# Patient Record
Sex: FEMALE | Race: WHITE | NOT HISPANIC OR LATINO | Employment: PART TIME | URBAN - METROPOLITAN AREA
[De-identification: names, ages, dates, MRNs, and addresses within clinical notes are randomized per-mention and may not be internally consistent; named-entity substitution may affect disease eponyms.]

---

## 2021-10-01 ENCOUNTER — HISTORICAL (OUTPATIENT)
Dept: ADMINISTRATIVE | Facility: HOSPITAL | Age: 61
End: 2021-10-01

## 2021-10-12 ENCOUNTER — HISTORICAL (OUTPATIENT)
Dept: PHYSICAL THERAPY | Facility: HOSPITAL | Age: 61
End: 2021-10-12

## 2021-10-15 ENCOUNTER — HISTORICAL (OUTPATIENT)
Dept: PHYSICAL THERAPY | Facility: HOSPITAL | Age: 61
End: 2021-10-15

## 2021-10-18 ENCOUNTER — HISTORICAL (OUTPATIENT)
Dept: PHYSICAL THERAPY | Facility: HOSPITAL | Age: 61
End: 2021-10-18

## 2021-10-20 ENCOUNTER — HISTORICAL (OUTPATIENT)
Dept: PHYSICAL THERAPY | Facility: HOSPITAL | Age: 61
End: 2021-10-20

## 2021-10-22 ENCOUNTER — HISTORICAL (OUTPATIENT)
Dept: PHYSICAL THERAPY | Facility: HOSPITAL | Age: 61
End: 2021-10-22

## 2021-10-25 ENCOUNTER — HISTORICAL (OUTPATIENT)
Dept: PHYSICAL THERAPY | Facility: HOSPITAL | Age: 61
End: 2021-10-25

## 2021-11-02 ENCOUNTER — HISTORICAL (OUTPATIENT)
Dept: ADMINISTRATIVE | Facility: HOSPITAL | Age: 61
End: 2021-11-02

## 2021-11-03 ENCOUNTER — HISTORICAL (OUTPATIENT)
Dept: PHYSICAL THERAPY | Facility: HOSPITAL | Age: 61
End: 2021-11-03

## 2021-11-05 ENCOUNTER — HISTORICAL (OUTPATIENT)
Dept: PHYSICAL THERAPY | Facility: HOSPITAL | Age: 61
End: 2021-11-05

## 2021-11-08 ENCOUNTER — HISTORICAL (OUTPATIENT)
Dept: PHYSICAL THERAPY | Facility: HOSPITAL | Age: 61
End: 2021-11-08

## 2021-11-12 ENCOUNTER — HISTORICAL (OUTPATIENT)
Dept: PHYSICAL THERAPY | Facility: HOSPITAL | Age: 61
End: 2021-11-12

## 2021-11-30 ENCOUNTER — HISTORICAL (OUTPATIENT)
Dept: PHYSICAL THERAPY | Facility: HOSPITAL | Age: 61
End: 2021-11-30

## 2021-12-02 ENCOUNTER — HISTORICAL (OUTPATIENT)
Dept: PHYSICAL THERAPY | Facility: HOSPITAL | Age: 61
End: 2021-12-02

## 2021-12-03 ENCOUNTER — HISTORICAL (OUTPATIENT)
Dept: PHYSICAL THERAPY | Facility: HOSPITAL | Age: 61
End: 2021-12-03

## 2021-12-03 ENCOUNTER — HISTORICAL (OUTPATIENT)
Dept: ADMINISTRATIVE | Facility: HOSPITAL | Age: 61
End: 2021-12-03

## 2021-12-06 ENCOUNTER — HISTORICAL (OUTPATIENT)
Dept: PHYSICAL THERAPY | Facility: HOSPITAL | Age: 61
End: 2021-12-06

## 2021-12-14 ENCOUNTER — HISTORICAL (OUTPATIENT)
Dept: PHYSICAL THERAPY | Facility: HOSPITAL | Age: 61
End: 2021-12-14

## 2021-12-15 ENCOUNTER — HISTORICAL (OUTPATIENT)
Dept: PHYSICAL THERAPY | Facility: HOSPITAL | Age: 61
End: 2021-12-15

## 2022-04-10 ENCOUNTER — HISTORICAL (OUTPATIENT)
Dept: ADMINISTRATIVE | Facility: HOSPITAL | Age: 62
End: 2022-04-10

## 2022-04-25 VITALS
HEIGHT: 62 IN | WEIGHT: 141.13 LBS | SYSTOLIC BLOOD PRESSURE: 117 MMHG | BODY MASS INDEX: 25.97 KG/M2 | DIASTOLIC BLOOD PRESSURE: 72 MMHG

## 2024-01-19 ENCOUNTER — HOSPITAL ENCOUNTER (EMERGENCY)
Facility: HOSPITAL | Age: 64
Discharge: HOME | End: 2024-01-19
Attending: STUDENT IN AN ORGANIZED HEALTH CARE EDUCATION/TRAINING PROGRAM
Payer: MEDICAID

## 2024-01-19 VITALS
OXYGEN SATURATION: 97 % | DIASTOLIC BLOOD PRESSURE: 76 MMHG | WEIGHT: 140 LBS | SYSTOLIC BLOOD PRESSURE: 116 MMHG | HEIGHT: 62 IN | BODY MASS INDEX: 25.76 KG/M2 | RESPIRATION RATE: 18 BRPM | TEMPERATURE: 98 F | HEART RATE: 96 BPM

## 2024-01-19 DIAGNOSIS — R07.81 RIB PAIN: ICD-10-CM

## 2024-01-19 DIAGNOSIS — R07.89 CHEST WALL PAIN: ICD-10-CM

## 2024-01-19 DIAGNOSIS — R07.81 RIB PAIN ON LEFT SIDE: Primary | ICD-10-CM

## 2024-01-19 PROCEDURE — 25000003 PHARM REV CODE 250: Performed by: NURSE PRACTITIONER

## 2024-01-19 PROCEDURE — 99284 EMERGENCY DEPT VISIT MOD MDM: CPT | Mod: 25

## 2024-01-19 RX ORDER — KETOROLAC TROMETHAMINE 10 MG/1
10 TABLET, FILM COATED ORAL EVERY 6 HOURS
Qty: 20 TABLET | Refills: 0 | Status: SHIPPED | OUTPATIENT
Start: 2024-01-19 | End: 2024-01-24

## 2024-01-19 RX ORDER — KETOROLAC TROMETHAMINE 10 MG/1
10 TABLET, FILM COATED ORAL
Status: COMPLETED | OUTPATIENT
Start: 2024-01-19 | End: 2024-01-19

## 2024-01-19 RX ORDER — METHOCARBAMOL 500 MG/1
1000 TABLET, FILM COATED ORAL 3 TIMES DAILY
Qty: 30 TABLET | Refills: 0 | Status: SHIPPED | OUTPATIENT
Start: 2024-01-19 | End: 2024-01-24

## 2024-01-19 RX ORDER — METHOCARBAMOL 500 MG/1
1000 TABLET, FILM COATED ORAL
Status: COMPLETED | OUTPATIENT
Start: 2024-01-19 | End: 2024-01-19

## 2024-01-19 RX ADMIN — METHOCARBAMOL 1000 MG: 500 TABLET ORAL at 05:01

## 2024-01-19 RX ADMIN — KETOROLAC TROMETHAMINE 10 MG: 10 TABLET, FILM COATED ORAL at 05:01

## 2024-01-19 NOTE — ED PROVIDER NOTES
Encounter Date: 1/19/2024       History     Chief Complaint   Patient presents with    Rib Injury     Pt c/o left rib pain that started Monday after she leaned over her porch; denies falling.      64 yo female with h/o thyroid disorder presents with a c/o left rib pain.  Onset x1 week ago.  Provocative factors include movement and deep breathing.  Palliative factors include rest.  Pt admits that she reached over a railing to throw something in her trash and felt a pop/strain in her left lower ribs.  Pain has been persistent over the week.  She denies fever, cough and congestion, dyspnea, chest pain, palpitations, abd pain, n/v/d, weakness, dizziness.    The history is provided by the patient. No  was used.     Review of patient's allergies indicates:  No Known Allergies  Past Medical History:   Diagnosis Date    Thyroid disease      Past Surgical History:   Procedure Laterality Date    THYROIDECTOMY, PARTIAL      TUBAL LIGATION       No family history on file.  Social History     Tobacco Use    Smoking status: Every Day     Current packs/day: 0.50     Average packs/day: 0.5 packs/day for 35.0 years (17.5 ttl pk-yrs)     Types: Cigarettes   Substance Use Topics    Alcohol use: No     Alcohol/week: 0.0 standard drinks of alcohol    Drug use: No     Review of Systems   Constitutional: Negative.  Negative for appetite change, chills and fever.   HENT:  Negative for congestion and sore throat.    Eyes: Negative.    Respiratory:  Negative for cough, chest tightness, shortness of breath and wheezing.    Cardiovascular: Negative.  Negative for chest pain and palpitations.   Gastrointestinal: Negative.  Negative for abdominal pain, diarrhea, nausea and vomiting.   Endocrine: Negative.    Genitourinary: Negative.  Negative for dysuria.   Musculoskeletal: Negative.  Negative for back pain.        Left lower rib pain   Skin: Negative.  Negative for rash.   Allergic/Immunologic: Negative.    Neurological:  Negative.  Negative for dizziness, weakness, light-headedness and headaches.   Hematological: Negative.  Does not bruise/bleed easily.   Psychiatric/Behavioral: Negative.         Physical Exam     Initial Vitals [01/19/24 1634]   BP Pulse Resp Temp SpO2   116/76 96 18 98.2 °F (36.8 °C) 97 %      MAP       --         Physical Exam    Nursing note and vitals reviewed.  Constitutional: She appears well-developed and well-nourished.   HENT:   Head: Normocephalic and atraumatic.   Eyes: Conjunctivae and EOM are normal. Pupils are equal, round, and reactive to light.   Neck: Neck supple.   Normal range of motion.  Cardiovascular:  Normal rate, regular rhythm, normal heart sounds and intact distal pulses.           Pulmonary/Chest: Breath sounds normal.   Abdominal: Abdomen is soft. Bowel sounds are normal.   Musculoskeletal:         General: Normal range of motion.        Arms:       Cervical back: Normal range of motion and neck supple.     Neurological: She is alert and oriented to person, place, and time. She has normal strength.   Skin: Skin is warm and dry. Capillary refill takes less than 2 seconds.   Psychiatric: She has a normal mood and affect. Her behavior is normal.         ED Course   Procedures  Labs Reviewed - No data to display       Imaging Results              X-Ray Chest PA And Lateral (Final result)  Result time 01/19/24 17:05:51      Final result by Jefferson Steele MD (01/19/24 17:05:51)                   Impression:      No acute cardiopulmonary process identified.      Electronically signed by: Jefferson Steele  Date:    01/19/2024  Time:    17:05               Narrative:    EXAMINATION:  XR CHEST PA AND LATERAL    CLINICAL HISTORY:  Other chest pain    TECHNIQUE:  Two-view    COMPARISON:  None avail.    FINDINGS:  Cardiopericardial silhouette is within normal limits.  No acute dense focal or segmental consolidation, congestion, pleural effusion or pneumothorax.                                        X-Ray Ribs 2 View Left (Final result)  Result time 01/19/24 17:07:05      Final result by Jefferson Steele MD (01/19/24 17:07:05)                   Impression:      No acute finding of the left ribs identified.      Electronically signed by: Jefferson Steele  Date:    01/19/2024  Time:    17:07               Narrative:    EXAMINATION:  XR RIBS 2 VIEW LEFT    CLINICAL HISTORY:  Pleurodynia    TECHNIQUE:  Two-view    COMPARISON:  Chest radiograph same date    FINDINGS:  Fine bony details are not well seen due to demineralization.  No definite acute fracture or otherwise deformity of the left ribs identified.  Left lung is well expanded and clear.                                       Medications   ketorolac tablet 10 mg (has no administration in time range)   methocarbamoL tablet 1,000 mg (has no administration in time range)     Medical Decision Making  Differential diagnoses:  rib fracture, rib contusion, costochondritis    62 yo female with h/o thyroid disorder presents with a c/o left rib pain.  Onset x1 week ago.  Provocative factors include movement and deep breathing.  Palliative factors include rest.  Pt admits that she reached over a railing to throw something in her trash and felt a pop/strain in her left lower ribs.  Pain has been persistent over the week.  She denies fever, cough and congestion, dyspnea, chest pain, palpitations, abd pain, n/v/d, weakness, dizziness.  Physical exam as documented.  Patient is nontoxic in appearance and in no acute distress.  She does have some tenderness to palpation over the left lower rib however, she moves and ambulates without any facial grimacing.  She was evaluated with x-rays of her rib and chest which show no acute osseous abnormality, no rib fractures.  Patient's symptoms are consistent with a muscle strain.  She will be discharged home with anti-inflammatories and muscle relaxers to help alleviate her symptoms.  She is to follow up with her primary care provider if  her symptoms persist.  She may return to the ED for worsening.  Patient verbalized understanding of the plan and agrees.    Amount and/or Complexity of Data Reviewed  Radiology: ordered. Decision-making details documented in ED Course.    Risk  OTC drugs.  Prescription drug management.                                      Clinical Impression:  Final diagnoses:  [R07.89] Chest wall pain  [R07.81] Rib pain  [R07.81] Rib pain on left side (Primary)          ED Disposition Condition    Discharge Stable          ED Prescriptions       Medication Sig Dispense Start Date End Date Auth. Provider    ketorolac (TORADOL) 10 mg tablet Take 1 tablet (10 mg total) by mouth every 6 (six) hours. for 5 days 20 tablet 1/19/2024 1/24/2024 Arabella Espinoza NP    methocarbamoL (ROBAXIN) 500 MG Tab Take 2 tablets (1,000 mg total) by mouth 3 (three) times daily. for 5 days 30 tablet 1/19/2024 1/24/2024 Arabella Espinoza NP          Follow-up Information       Follow up With Specialties Details Why Contact Info    Sylvia Antunez NP-C Family Medicine In 1 week For ED follow-up, As needed [address] [facility] [address]  [phone]               Arabella Espinoza NP  01/19/24 3698

## 2024-07-18 DIAGNOSIS — R59.1 LYMPHADENOPATHY: Primary | ICD-10-CM

## 2024-08-07 ENCOUNTER — OFFICE VISIT (OUTPATIENT)
Dept: CARDIAC SURGERY | Facility: CLINIC | Age: 64
End: 2024-08-07
Payer: MEDICARE

## 2024-08-07 VITALS
HEIGHT: 62 IN | SYSTOLIC BLOOD PRESSURE: 131 MMHG | DIASTOLIC BLOOD PRESSURE: 79 MMHG | WEIGHT: 147 LBS | BODY MASS INDEX: 27.05 KG/M2 | HEART RATE: 66 BPM | OXYGEN SATURATION: 100 %

## 2024-08-07 DIAGNOSIS — Z79.1 ENCOUNTER FOR MONITORING NSAID THERAPY: ICD-10-CM

## 2024-08-07 DIAGNOSIS — Z51.81 ENCOUNTER FOR MONITORING NSAID THERAPY: ICD-10-CM

## 2024-08-07 DIAGNOSIS — R59.1 LYMPHADENOPATHY: Primary | ICD-10-CM

## 2024-08-07 DIAGNOSIS — R91.1 PULMONARY NODULE: ICD-10-CM

## 2024-08-07 PROCEDURE — 99204 OFFICE O/P NEW MOD 45 MIN: CPT | Mod: ,,, | Performed by: THORACIC SURGERY (CARDIOTHORACIC VASCULAR SURGERY)

## 2024-08-07 RX ORDER — DICYCLOMINE HYDROCHLORIDE 20 MG/1
TABLET ORAL
COMMUNITY

## 2024-08-07 RX ORDER — GABAPENTIN 300 MG/1
300 CAPSULE ORAL
COMMUNITY
Start: 2024-06-05

## 2024-08-07 RX ORDER — BUPROPION HYDROCHLORIDE 300 MG/1
300 TABLET ORAL EVERY MORNING
COMMUNITY
Start: 2024-07-19

## 2024-08-07 RX ORDER — ALENDRONATE SODIUM 70 MG/1
70 TABLET ORAL
COMMUNITY
End: 2024-08-07

## 2024-08-07 RX ORDER — TRAZODONE HYDROCHLORIDE 50 MG/1
50-100 TABLET ORAL NIGHTLY PRN
COMMUNITY
Start: 2024-07-18 | End: 2024-08-07

## 2024-08-07 RX ORDER — IRON,CARBONYL/ASCORBIC ACID 100-250 MG
1 TABLET ORAL
COMMUNITY
Start: 2024-07-09

## 2024-08-07 RX ORDER — ASPIRIN 325 MG
50000 TABLET, DELAYED RELEASE (ENTERIC COATED) ORAL
COMMUNITY
Start: 2024-06-14

## 2024-08-07 NOTE — H&P (VIEW-ONLY)
History & Physical    SUBJECTIVE:     History of Present Illness:  The patient is presenting for evaluation of enlarging mediastinal lymph nodes.  She is here for possible biopsy.  She denies any shortness of breath or cough.      Chief Complaint   Patient presents with    Pre-op Exam     REFERRAL-DR FUENTES-MEDIASTINOSCOPY. DX: PULMONARY NODULES-RT UPPER LOBE, LYMPHADENOPATHY. PMH: HYPOTHYROIDISM, COPD, EMPHYSEMA, SMOKER.       Review of patient's allergies indicates:  No Known Allergies    Current Outpatient Medications   Medication Sig Dispense Refill    buPROPion (WELLBUTRIN XL) 300 MG 24 hr tablet Take 300 mg by mouth every morning.      cholecalciferol, vitamin D3, 1,250 mcg (50,000 unit) capsule Take 50,000 Units by mouth every 7 days.      dicyclomine (BENTYL) 20 mg tablet TAKE 1 TABLET BY MOUTH 4 TIMES DAILY FOR 30 DAYS for 30      gabapentin (NEURONTIN) 300 MG capsule Take 300 mg by mouth.      iron-vitamin C 100-250 mg, ICAR-C, 100-250 mg Tab Take 1 tablet by mouth.      levothyroxine (SYNTHROID) 88 MCG tablet Take 88 mcg by mouth once daily.       No current facility-administered medications for this visit.       Past Medical History:   Diagnosis Date    Thyroid disease      Past Surgical History:   Procedure Laterality Date    THYROIDECTOMY, PARTIAL      TUBAL LIGATION       No family history on file.  Social History     Tobacco Use    Smoking status: Every Day     Current packs/day: 0.50     Average packs/day: 0.5 packs/day for 35.0 years (17.5 ttl pk-yrs)     Types: Cigarettes   Substance Use Topics    Alcohol use: No     Alcohol/week: 0.0 standard drinks of alcohol    Drug use: No        Review of Systems:  Review of Systems   Constitutional: Negative.    HENT: Negative.     Eyes: Negative.    Respiratory: Negative.     Cardiovascular: Negative.    Gastrointestinal: Negative.    Endocrine: Negative.    Genitourinary: Negative.    Musculoskeletal: Negative.         Claudications   Skin: Negative.   "  Allergic/Immunologic: Negative.    Neurological: Negative.    Hematological: Negative.    Psychiatric/Behavioral: Negative.         OBJECTIVE:     Vital Signs (Most Recent)  Pulse: 66 (08/07/24 1126)  BP: 131/79 (08/07/24 1126)  SpO2: 100 % (08/07/24 1126)  5' 2" (1.575 m)  66.7 kg (147 lb)     Physical Exam:  Physical Exam  Vitals reviewed.   Constitutional:       Appearance: Normal appearance.   HENT:      Head: Normocephalic and atraumatic.      Nose: Nose normal.      Mouth/Throat:      Mouth: Mucous membranes are dry.      Pharynx: Oropharynx is clear.   Eyes:      Extraocular Movements: Extraocular movements intact.      Conjunctiva/sclera: Conjunctivae normal.      Pupils: Pupils are equal, round, and reactive to light.   Cardiovascular:      Rate and Rhythm: Normal rate and regular rhythm.      Pulses: Normal pulses.   Pulmonary:      Effort: Pulmonary effort is normal.      Breath sounds: Normal breath sounds.   Abdominal:      General: Abdomen is flat.      Palpations: Abdomen is soft.   Musculoskeletal:         General: Normal range of motion.      Cervical back: Neck supple.   Skin:     General: Skin is warm and dry.   Neurological:      General: No focal deficit present.   Psychiatric:         Mood and Affect: Mood normal.         Laboratory:  None      Diagnostic Results:  CTA of the chest reviewed with mediastinal lymphadenopathy      ASSESSMENT/PLAN:     Mediastinal lymphadenopathy.  We will definitely recommend mediastinoscopy.  The risks and benefits of the procedure have been explained to the patient including the risk of bleeding and infection.  She elected to proceed.                "

## 2024-08-19 ENCOUNTER — ANESTHESIA EVENT (OUTPATIENT)
Dept: SURGERY | Facility: HOSPITAL | Age: 64
End: 2024-08-19
Payer: MEDICARE

## 2024-08-26 ENCOUNTER — HOSPITAL ENCOUNTER (OUTPATIENT)
Dept: RADIOLOGY | Facility: HOSPITAL | Age: 64
Discharge: HOME | End: 2024-08-26
Attending: THORACIC SURGERY (CARDIOTHORACIC VASCULAR SURGERY)
Payer: MEDICARE

## 2024-08-26 DIAGNOSIS — R59.1 LYMPHADENOPATHY: ICD-10-CM

## 2024-08-26 DIAGNOSIS — R91.1 PULMONARY NODULE: ICD-10-CM

## 2024-08-26 PROCEDURE — 71046 X-RAY EXAM CHEST 2 VIEWS: CPT | Mod: TC

## 2024-08-28 NOTE — PRE-PROCEDURE INSTRUCTIONS
"[facility]: Outpatient Surgery  Preprocedure Check-In Instructions     Your arrival time for your surgery or procedure is ___0500___.  We ask patients to arrive about 2 hours before surgery to allow for enough time to review your health history & medications, start your IV, complete any outstanding labwork or tests, and meet your Anesthesiologist.    Expectations: "Because of inconsistent procedure completion times, an unexpected wait may occur. The Physicians would like you to be here to prepare in the event they run ahead of time. We will make you as comfortable as possible and keep you informed. We apologize in advance if this happens."    You will arrive at [facility], [address], [place].  Enter through the [place] entrance next to the Emergency Room, and come to the 6th floor to the Outpatient Surgery Department.     Visitory Policy:  You are allowed 2 adult visitors to be with you in the hospital. All hospital visitors should be in good current health.  No small children.     What to Bring:  Please have your ID, insurance cards, and all home medication bottles with you at check in.  Bring your CPAP machine if one is used at home.     Fasting:  Nothing to eat or drink after midnight the night before your procedure. This includes no ice, gum, hard candies, and/or tobacco products.  Follow your doctor's instructions for taking any medications on the morning of your procedure.  If no instructions for taking medications were given, do not take any medications but bring your medications in their bottles to your procedure check in.     Follow your doctor's preoperative instructions regarding skin prep, bowel prep, bathing, or showering prior to your procedure.  If any special soaps were provided to you, please use according to your doctor's instructions. If no instructions were given from your doctor, take a good bath or shower with antibacterial soap the night " before and the morning of your procedure.  On the morning of procedure, wear loose, comfortable clothing.  No lotions, makeup, perfumes, colognes, deodorant, or jewelry to your procedure.  Removable items (glasses, contact lenses, dentures, retainers, hearing aids) need to be removed for your procedure.  Bring your storage containers for these items if you wear them.     Artificial nails, body jewelry, eyelash extensions, and/or hair extensions with metal clips are not allowed during your surgery.  If you currently wear any of these items, please arrange for them to be removed prior to your arrival to the hospital.     Outpatient or Same Day Surgeries:  Any patients receiving sedation/anesthesia are advised not to drive for 24 hours after their procedure.  We do not allow patients to drive themselves home after discharge.  If you are going home after your procedure, please have someone available to drive you home from the hospital.        You may call the Outpatient Surgery Department at [phone] with any questions or concerns.  We are looking forward to meeting you and taking great care of you for your procedure.  Thank you for choosing [facility] for your surgical needs.

## 2024-08-29 ENCOUNTER — ANESTHESIA (OUTPATIENT)
Dept: SURGERY | Facility: HOSPITAL | Age: 64
End: 2024-08-29
Payer: MEDICARE

## 2024-08-29 ENCOUNTER — HOSPITAL ENCOUNTER (OUTPATIENT)
Facility: HOSPITAL | Age: 64
Discharge: HOME | End: 2024-08-29
Attending: THORACIC SURGERY (CARDIOTHORACIC VASCULAR SURGERY) | Admitting: THORACIC SURGERY (CARDIOTHORACIC VASCULAR SURGERY)
Payer: MEDICARE

## 2024-08-29 VITALS
OXYGEN SATURATION: 97 % | TEMPERATURE: 97 F | BODY MASS INDEX: 26.53 KG/M2 | SYSTOLIC BLOOD PRESSURE: 122 MMHG | DIASTOLIC BLOOD PRESSURE: 76 MMHG | HEART RATE: 55 BPM | RESPIRATION RATE: 15 BRPM | WEIGHT: 145.06 LBS

## 2024-08-29 DIAGNOSIS — R59.1 LYMPHADENOPATHY: ICD-10-CM

## 2024-08-29 DIAGNOSIS — R91.1 PULMONARY NODULE: ICD-10-CM

## 2024-08-29 LAB
GRAM STN SPEC: NORMAL
GRAM STN SPEC: NORMAL

## 2024-08-29 PROCEDURE — 87102 FUNGUS ISOLATION CULTURE: CPT | Performed by: THORACIC SURGERY (CARDIOTHORACIC VASCULAR SURGERY)

## 2024-08-29 PROCEDURE — 36000711: Performed by: THORACIC SURGERY (CARDIOTHORACIC VASCULAR SURGERY)

## 2024-08-29 PROCEDURE — 87070 CULTURE OTHR SPECIMN AEROBIC: CPT | Performed by: THORACIC SURGERY (CARDIOTHORACIC VASCULAR SURGERY)

## 2024-08-29 PROCEDURE — 37000009 HC ANESTHESIA EA ADD 15 MINS: Performed by: THORACIC SURGERY (CARDIOTHORACIC VASCULAR SURGERY)

## 2024-08-29 PROCEDURE — 37000008 HC ANESTHESIA 1ST 15 MINUTES: Performed by: THORACIC SURGERY (CARDIOTHORACIC VASCULAR SURGERY)

## 2024-08-29 PROCEDURE — 25000003 PHARM REV CODE 250: Performed by: THORACIC SURGERY (CARDIOTHORACIC VASCULAR SURGERY)

## 2024-08-29 PROCEDURE — 71000033 HC RECOVERY, INTIAL HOUR: Performed by: THORACIC SURGERY (CARDIOTHORACIC VASCULAR SURGERY)

## 2024-08-29 PROCEDURE — 63600175 PHARM REV CODE 636 W HCPCS

## 2024-08-29 PROCEDURE — 63600175 PHARM REV CODE 636 W HCPCS: Performed by: ANESTHESIOLOGY

## 2024-08-29 PROCEDURE — 39402 MEDIASTINOSCPY W/LMPH NOD BX: CPT | Mod: ,,, | Performed by: THORACIC SURGERY (CARDIOTHORACIC VASCULAR SURGERY)

## 2024-08-29 PROCEDURE — 87206 SMEAR FLUORESCENT/ACID STAI: CPT | Performed by: THORACIC SURGERY (CARDIOTHORACIC VASCULAR SURGERY)

## 2024-08-29 PROCEDURE — 25000003 PHARM REV CODE 250

## 2024-08-29 PROCEDURE — 87075 CULTR BACTERIA EXCEPT BLOOD: CPT | Performed by: THORACIC SURGERY (CARDIOTHORACIC VASCULAR SURGERY)

## 2024-08-29 PROCEDURE — 63600175 PHARM REV CODE 636 W HCPCS: Performed by: THORACIC SURGERY (CARDIOTHORACIC VASCULAR SURGERY)

## 2024-08-29 PROCEDURE — 71000015 HC POSTOP RECOV 1ST HR: Performed by: THORACIC SURGERY (CARDIOTHORACIC VASCULAR SURGERY)

## 2024-08-29 PROCEDURE — 87205 SMEAR GRAM STAIN: CPT | Performed by: THORACIC SURGERY (CARDIOTHORACIC VASCULAR SURGERY)

## 2024-08-29 PROCEDURE — 36000710: Performed by: THORACIC SURGERY (CARDIOTHORACIC VASCULAR SURGERY)

## 2024-08-29 PROCEDURE — 71000016 HC POSTOP RECOV ADDL HR: Performed by: THORACIC SURGERY (CARDIOTHORACIC VASCULAR SURGERY)

## 2024-08-29 RX ORDER — ONDANSETRON HYDROCHLORIDE 2 MG/ML
INJECTION, SOLUTION INTRAVENOUS
Status: DISCONTINUED | OUTPATIENT
Start: 2024-08-29 | End: 2024-08-29

## 2024-08-29 RX ORDER — LIDOCAINE HYDROCHLORIDE 20 MG/ML
INJECTION INTRAVENOUS
Status: DISCONTINUED | OUTPATIENT
Start: 2024-08-29 | End: 2024-08-29

## 2024-08-29 RX ORDER — KETOROLAC TROMETHAMINE 30 MG/ML
15 INJECTION, SOLUTION INTRAMUSCULAR; INTRAVENOUS ONCE
Status: COMPLETED | OUTPATIENT
Start: 2024-08-29 | End: 2024-08-29

## 2024-08-29 RX ORDER — DEXAMETHASONE SODIUM PHOSPHATE 4 MG/ML
INJECTION, SOLUTION INTRA-ARTICULAR; INTRALESIONAL; INTRAMUSCULAR; INTRAVENOUS; SOFT TISSUE
Status: DISCONTINUED | OUTPATIENT
Start: 2024-08-29 | End: 2024-08-29

## 2024-08-29 RX ORDER — ONDANSETRON HYDROCHLORIDE 2 MG/ML
4 INJECTION, SOLUTION INTRAVENOUS ONCE
Status: COMPLETED | OUTPATIENT
Start: 2024-08-29 | End: 2024-08-29

## 2024-08-29 RX ORDER — PROPOFOL 10 MG/ML
VIAL (ML) INTRAVENOUS
Status: DISCONTINUED | OUTPATIENT
Start: 2024-08-29 | End: 2024-08-29

## 2024-08-29 RX ORDER — ROCURONIUM BROMIDE 10 MG/ML
INJECTION, SOLUTION INTRAVENOUS
Status: DISCONTINUED | OUTPATIENT
Start: 2024-08-29 | End: 2024-08-29

## 2024-08-29 RX ORDER — HYDROMORPHONE HYDROCHLORIDE 2 MG/ML
0.4 INJECTION, SOLUTION INTRAMUSCULAR; INTRAVENOUS; SUBCUTANEOUS EVERY 5 MIN PRN
Status: DISCONTINUED | OUTPATIENT
Start: 2024-08-29 | End: 2024-08-29 | Stop reason: HOSPADM

## 2024-08-29 RX ORDER — SODIUM CHLORIDE 0.9 % (FLUSH) 0.9 %
10 SYRINGE (ML) INJECTION
Status: DISCONTINUED | OUTPATIENT
Start: 2024-08-29 | End: 2024-08-29 | Stop reason: HOSPADM

## 2024-08-29 RX ORDER — MIDAZOLAM HYDROCHLORIDE 1 MG/ML
INJECTION INTRAMUSCULAR; INTRAVENOUS
Status: DISCONTINUED | OUTPATIENT
Start: 2024-08-29 | End: 2024-08-29

## 2024-08-29 RX ORDER — METRONIDAZOLE 500 MG/1
500 TABLET ORAL 2 TIMES DAILY
COMMUNITY

## 2024-08-29 RX ORDER — FENTANYL CITRATE 50 UG/ML
INJECTION, SOLUTION INTRAMUSCULAR; INTRAVENOUS
Status: DISCONTINUED | OUTPATIENT
Start: 2024-08-29 | End: 2024-08-29

## 2024-08-29 RX ORDER — HYDROCODONE BITARTRATE AND ACETAMINOPHEN 5; 325 MG/1; MG/1
1 TABLET ORAL EVERY 6 HOURS PRN
Qty: 15 TABLET | Refills: 0 | Status: SHIPPED | OUTPATIENT
Start: 2024-08-29 | End: 2024-09-02

## 2024-08-29 RX ADMIN — DEXTROSE MONOHYDRATE 1.5 G: 5 INJECTION INTRAVENOUS at 07:08

## 2024-08-29 RX ADMIN — HYDROMORPHONE HYDROCHLORIDE 0.4 MG: 2 INJECTION INTRAMUSCULAR; INTRAVENOUS; SUBCUTANEOUS at 08:08

## 2024-08-29 RX ADMIN — ROCURONIUM BROMIDE 50 MG: 10 SOLUTION INTRAVENOUS at 07:08

## 2024-08-29 RX ADMIN — LIDOCAINE HYDROCHLORIDE 80 MG: 20 INJECTION INTRAVENOUS at 07:08

## 2024-08-29 RX ADMIN — FENTANYL CITRATE 50 MCG: 50 INJECTION, SOLUTION INTRAMUSCULAR; INTRAVENOUS at 07:08

## 2024-08-29 RX ADMIN — SODIUM CHLORIDE, SODIUM GLUCONATE, SODIUM ACETATE, POTASSIUM CHLORIDE AND MAGNESIUM CHLORIDE: 526; 502; 368; 37; 30 INJECTION, SOLUTION INTRAVENOUS at 07:08

## 2024-08-29 RX ADMIN — ONDANSETRON 4 MG: 2 INJECTION INTRAMUSCULAR; INTRAVENOUS at 07:08

## 2024-08-29 RX ADMIN — ONDANSETRON 4 MG: 2 INJECTION INTRAMUSCULAR; INTRAVENOUS at 10:08

## 2024-08-29 RX ADMIN — KETOROLAC TROMETHAMINE 15 MG: 30 INJECTION, SOLUTION INTRAMUSCULAR at 08:08

## 2024-08-29 RX ADMIN — MIDAZOLAM HYDROCHLORIDE 2 MG: 1 INJECTION, SOLUTION INTRAMUSCULAR; INTRAVENOUS at 07:08

## 2024-08-29 RX ADMIN — SUGAMMADEX 200 MG: 100 INJECTION, SOLUTION INTRAVENOUS at 07:08

## 2024-08-29 RX ADMIN — DEXAMETHASONE SODIUM PHOSPHATE 4 MG: 4 INJECTION, SOLUTION INTRA-ARTICULAR; INTRALESIONAL; INTRAMUSCULAR; INTRAVENOUS; SOFT TISSUE at 07:08

## 2024-08-29 RX ADMIN — PROPOFOL 100 MG: 10 INJECTION, EMULSION INTRAVENOUS at 07:08

## 2024-08-29 RX ADMIN — SUGAMMADEX 100 MG: 100 INJECTION, SOLUTION INTRAVENOUS at 08:08

## 2024-08-29 NOTE — OP NOTE
[facility]                       [address] [address]    PATIENT NAME:      MARIA TERESA LIM  YOB: 1960  CSN:               [id number]  MRN:               [id number]  ADMIT DATE:        08/29/2024 04:59:00  PHYSICIAN:         Melony Collier MD                          OPERATIVE REPORT      DATE OF SURGERY:    08/29/2024 00:00:00    SURGEON:  Melony Collier MD    PREOPERATIVE DIAGNOSIS:  Mediastinal adenopathy.    PROCEDURE:  Cervical mediastinoscopy.    POSTOPERATIVE DIAGNOSIS:  Possible carcinoma.    BLOOD LOSS:  Minimal.    ANESTHESIA:  General.    TECHNIQUE:  Under informed consent, patient was taken to the OR, in supine   position, general anesthesia was induced and therefore maintained for remainder   of procedure.  All pressure points padded equally.  Skin over chest and neck was   prepped in the usual sterile fashion.  IV antibiotics administered.  A small   incision was made over the suprasternal notch followed by taking down   subcutaneous tissue and platysma.  The median raphe was penetrated and the   pretracheal plane was penetrated.  Mediastinoscope was placed and it was guided   to the 2R lymph node location.  Multiple biopsies were taken and cultures were   taken.  Frozen section came back as possible carcinoma.  The wounds were   irrigated and closed in layers with absorbable suture.  The patient tolerated   the procedure well.        ______________________________  Melony Collier MD MAA/AQS  DD:  08/29/2024  Time:  08:29AM  DT:  08/29/2024  Time:  09:02AM  Job #:  [id number]      OPERATIVE REPORT

## 2024-08-29 NOTE — TRANSFER OF CARE
Anesthesia Transfer of Care Note    Patient: Valarie Rodriguez    Procedure(s) Performed: Procedure(s) (LRB):  MEDIASTINOSCOPY (N/A)    Patient location: PACU    Anesthesia Type: general    Transport from OR: Transported from OR on room air with adequate spontaneous ventilation    Post pain: adequate analgesia    Post assessment: no apparent anesthetic complications and tolerated procedure well    Post vital signs: stable    Level of consciousness: awake and alert    Nausea/Vomiting: no nausea/vomiting    Complications: none    Transfer of care protocol was followed      Last vitals: Visit Vitals  /64   Pulse 70   Temp 36.6 °C (97.9 °F) (Oral)   Resp 14   Wt 65.8 kg (145 lb 1 oz)   SpO2 99%   Breastfeeding No   BMI 26.53 kg/m²

## 2024-08-29 NOTE — DISCHARGE SUMMARY
[facility] - Periop Services  Cardiothoracic Surgery  Discharge Summary      Patient Name: Valarie Rodriguez  MRN: [id number]  Admission Date: 8/29/2024  Hospital Length of Stay: 0 days  Discharge Date and Time: No discharge date for patient encounter.  Attending Physician: Melony Collier MD   Discharging Provider: MARTHA Hill  Primary Care Provider: Sylvia Antunez NP-C    HPI:   No notes on file    Procedure(s) (LRB):  MEDIASTINOSCOPY (N/A)      Indwelling Lines/Drains at time of discharge:   Lines/Drains/Airways       Airway  Duration                  Airway - Non-Surgical 08/29/24 0725 <1 day                  Hospital Course: No notes on file    Goals of Care Treatment Preferences:             Significant Diagnostic Studies: Radiology: X-Ray: CXR: X-Ray Chest 1 View (CXR): No results found for this visit on 08/29/24.    Pending Diagnostic Studies:       Procedure Component Value Units Date/Time    Flow Cytometry Panel [[id number]] Collected: 08/29/24 0751    Order Status: Sent Lab Status: In process Updated: 08/29/24 0757    Specimen: Tissue from Lymph Node     Specimen to Pathology [[id number]] Collected: 08/29/24 0743    Order Status: Sent Lab Status: No result     Specimen: Tissue from Lymph Node, Tissue from Lymph Node             No new Assessment & Plan notes have been filed under this hospital service since the last note was generated.  Service: Cardiothoracic Surgery    Final Active Diagnoses:    Diagnosis Date Noted POA    PRINCIPAL PROBLEM:  Lymphadenopathy [R59.1] 08/29/2024 Yes      Problems Resolved During this Admission:      Discharged Condition: good    Disposition: Home or Self Care    Follow Up:   Follow-up Information       Melony Collier MD Follow up in 3 week(s).    Specialties: Cardiothoracic Surgery, Cardiology  Contact information:  [address] [place] [address]  [address]  [facility] [id number] [phone]                           Patient Instructions:   No discharge  procedures on file.  Medications:  Reconciled Home Medications:      Medication List        START taking these medications      HYDROcodone-acetaminophen 5-325 mg per tablet  Commonly known as: NORCO  Take 1 tablet by mouth every 6 (six) hours as needed for Pain.            CONTINUE taking these medications      buPROPion 300 MG 24 hr tablet  Commonly known as: WELLBUTRIN XL  Take 300 mg by mouth every morning.     cholecalciferol (vitamin D3) 1,250 mcg (50,000 unit) capsule  Take 50,000 Units by mouth every 7 days.     dicyclomine 20 mg tablet  Commonly known as: BENTYL  TAKE 1 TABLET BY MOUTH 4 TIMES DAILY FOR 30 DAYS for 30     gabapentin 300 MG capsule  Commonly known as: NEURONTIN  Take 300 mg by mouth.     iron-vitamin C 100-250 mg (ICAR-C) 100-250 mg Tab  Take 1 tablet by mouth every other day.     levothyroxine 88 MCG tablet  Commonly known as: SYNTHROID  Take 88 mcg by mouth once daily.     metroNIDAZOLE 500 MG tablet  Commonly known as: FLAGYL  Take 500 mg by mouth 2 (two) times a day.               Expand All Collapse All    History & Physical     SUBJECTIVE:      History of Present Illness:  The patient is presenting for evaluation of enlarging mediastinal lymph nodes.  She is here for possible biopsy.  She denies any shortness of breath or cough.             Chief Complaint   Patient presents with    Pre-op Exam       REFERRAL-DR FUENTES-MEDIASTINOSCOPY. DX: PULMONARY NODULES-RT UPPER LOBE, LYMPHADENOPATHY. PMH: HYPOTHYROIDISM, COPD, EMPHYSEMA, SMOKER.            Ms. Rodriguez is a 65yo female with mediastinal lymphadenopathy. She underwent mediastinoscopy and tolerated the procedure well. She recovered in PACU and was discharged home once all criteria met.  Time spent on the discharge of patient: 20 minutes    MARTHA Hill  Cardiothoracic Surgery  [facility] - Peri Services

## 2024-08-29 NOTE — ANESTHESIA PROCEDURE NOTES
Intubation    Date/Time: 8/29/2024 7:25 AM    Performed by: Anthony Titus  Authorized by: Pepito Villafana MD    Intubation:     Induction:  Intravenous    Intubated:  Postinduction    Mask Ventilation:  Easy mask    Attempts:  1    Attempted By:  Student    Method of Intubation:  Direct    Blade:  Chaudhary 2    Laryngeal View Grade: Grade I - full view of cords      Difficult Airway Encountered?: No      Complications:  None    Airway Device:  Oral endotracheal tube    Airway Device Size:  7.0    Style/Cuff Inflation:  Cuffed (inflated to minimal occlusive pressure)    Tube secured:  22    Secured at:  The lips    Placement Verified By:  Capnometry    Complicating Factors:  None    Findings Post-Intubation:  BS equal bilateral and atraumatic/condition of teeth unchanged

## 2024-08-29 NOTE — ANESTHESIA PREPROCEDURE EVALUATION
08/29/2024  Valarie Rodriguez is a 64 y.o., female.      Pre-op Assessment    I have reviewed the Patient Summary Reports.     I have reviewed the Nursing Notes. I have reviewed the NPO Status.   I have reviewed the Medications.     Review of Systems  Anesthesia Hx:  No problems with previous Anesthesia                Social:  Smoker       Cardiovascular:  Cardiovascular Normal Exercise tolerance: good    Denies Hypertension.   Denies MI.                                         Pulmonary:  Pulmonary Normal        Denies Sleep Apnea.                Renal/:  Renal/ Normal                 Hepatic/GI:  Hepatic/GI Normal     Denies GERD.             Neurological:  Neurology Normal                                      Endocrine:   Hypothyroidism              Physical Exam  General: Alert and Oriented    Airway:  Mallampati: II   Mouth Opening: Normal  TM Distance: Normal  Neck ROM: Normal ROM    Dental:  Intact    Chest/Lungs:  Clear to auscultation    Heart:  Rate: Normal  Rhythm: Regular Rhythm  Sounds: Normal        Anesthesia Plan  Type of Anesthesia, risks & benefits discussed:    Anesthesia Type: Gen ETT  Intra-op Monitoring Plan: Standard ASA Monitors  Post Op Pain Control Plan: multimodal analgesia  Airway Plan: Direct, Post-Induction  Informed Consent: Informed consent signed with the Patient and all parties understand the risks and agree with anesthesia plan.  All questions answered. Patient consented to blood products? Yes  ASA Score: 2  Day of Surgery Review of History & Physical: H&P Update referred to the surgeon/provider.    Ready For Surgery From Anesthesia Perspective.     .

## 2024-08-29 NOTE — DISCHARGE INSTRUCTIONS
NO DRIVING AND NO ALCOHOL consumption FOR 24 HOURS or while taking narcotic pain medications.    Keep sites CLEAN AND DRY for 48 hours. OK to shower afterwards. Do NOT submerge incision under water. Do not apply lotions, creams, or ointments.     NO heavy lifting. DO NOT lift objects greater than 10 lbs. Your doctor will advise at your follow up appointment when to resume normal activity.     Monitor for infection: chills, fever (100.4 F), redness or drainage at surgical site. Notify your doctor if any of these occur.     Report to your nearest ER if you experience any SUDDEN/SEVERE abdominal pain, trouble breathing, uncontrolled pain, profound weakness.     BLEEDING: if you experience uncontrollable bleeding, contact your doctor and go to ER.    NAUSEA: due to the anesthesia, you may experience nausea for up to 24 hours. If nausea and vomiting last longer, contact your doctor.     INFECTION:  watch for any signs or symptoms of infection such as chills, fever, redness or drainage at surgical site. Notify your doctor.     PAIN : take your pain medications as directed. If the pain medications are not helping, notify your doctor.

## 2024-08-30 ENCOUNTER — PATIENT MESSAGE (OUTPATIENT)
Dept: ADMINISTRATIVE | Facility: OTHER | Age: 64
End: 2024-08-30
Payer: MEDICARE

## 2024-08-30 LAB — M AVIUM PARATB TISS QL ZN STN: NORMAL

## 2024-08-30 NOTE — ANESTHESIA POSTPROCEDURE EVALUATION
Anesthesia Post Evaluation    Patient: Valarie Rodriguez    Procedure(s) Performed: Procedure(s) (LRB):  MEDIASTINOSCOPY (N/A)    Final Anesthesia Type: general      Patient location during evaluation: PACU  Patient participation: Yes- Able to Participate  Level of consciousness: awake and alert  Post-procedure vital signs: reviewed and stable  Pain management: adequate  Airway patency: patent    PONV status at discharge: No PONV  Anesthetic complications: no      Cardiovascular status: hemodynamically stable  Respiratory status: spontaneous ventilation and room air  Hydration status: euvolemic  Follow-up not needed.              Vitals Value Taken Time   /76 08/29/24 1139   Temp 36 °C (96.8 °F) 08/29/24 0810   Pulse 55 08/29/24 1155   Resp 15 08/29/24 0850   SpO2 97 % 08/29/24 1155         Event Time   Out of Recovery 08:51:00         Pain/Maddy Score: Pain Rating Prior to Med Admin: 6 (8/29/2024  8:30 AM)  Maddy Score: 10 (8/29/2024 12:01 PM)  Modified Maddy Score: 20 (8/29/2024 12:01 PM)

## 2024-08-31 ENCOUNTER — PATIENT MESSAGE (OUTPATIENT)
Dept: ADMINISTRATIVE | Facility: OTHER | Age: 64
End: 2024-08-31
Payer: MEDICARE

## 2024-09-01 LAB — BACTERIA SPEC ANAEROBE CULT: NORMAL

## 2024-09-03 LAB
BACTERIA TISS AEROBE CULT: NORMAL
PSYCHE PATHOLOGY RESULT: NORMAL

## 2024-09-05 ENCOUNTER — TELEPHONE (OUTPATIENT)
Dept: CARDIAC SURGERY | Facility: CLINIC | Age: 64
End: 2024-09-05
Payer: MEDICARE

## 2024-09-05 DIAGNOSIS — C79.9 METASTATIC CARCINOMA: Primary | ICD-10-CM

## 2024-09-05 NOTE — TELEPHONE ENCOUNTER
Pt states she saw Dr. Martinez today and path results reviewed with her and she believes they are referring her to Oncologist.  Called Dr Martinez's office and they did send referral today to [facility].

## 2024-09-07 LAB
TEMPUS PD-L1 (22C3) COMBINED POSITIVE SCORE: 85
TEMPUS PD-L1 (22C3) TUMOR PROPORTION SCORE: 80 %
TEMPUS PORTAL: NORMAL

## 2024-09-12 DIAGNOSIS — C78.00: Primary | ICD-10-CM

## 2024-09-13 PROBLEM — C34.11 MALIGNANT NEOPLASM OF UPPER LOBE OF RIGHT LUNG: Status: ACTIVE | Noted: 2024-09-13

## 2024-09-13 NOTE — PROGRESS NOTES
Referring physician: Dr. Zach Martinez  Reason for referral: NSCLC      Subjective:       Patient ID: Valarie Rodriguez is a 64 y.o. female.    NSCLC Stage IIIA ([id number])--Diagnosed 24  Biopsy/pathology: Cervical mediastinoscopy done 24--multiple biopsies taken of 2R (upper paratracheal) LN and pathology showed poorly differentiated carcinoma, c/w lung primary site, PD-L1 TPS 80%, CPS 85%, KRAS G12C mutation (approved therapies Adagrasib or Sotorasib), TMB 1.6 (low), MSI cannot be assessed.  Imagin. CT chest w/o contrast 23--7mm RUL spiculated nodule again seen and similar in appearance to comparison, suspicious for neoplasia, mediastinal lymph nodes similar in comparison, emphysema is noted.  2. CT chest w/o contrast done 2/15/24--stable RUL spiculated nodule, development of right hilar adenopathy, enlarged precarinal LN, increased in size compared to previous exam, PET/CT should be considered, emphysema.   3. CT chest w/o contrast 24--stable RUL spiculated nodule measures 7mm, stable small ground glass densities, enlarged precarinal LN measures 2.7X1.9cm, increased in size, right hilar LN vs. Mass measures 2.3X1.6cm, stable, PET should be considered, emphysema.   4. PET/CT done 24--hypermetabolic enlarged right paratracheal LN and prominent right hilum with slightly elevated activity c/w neoplasm, elevated activity of anterior thyroid bed and subcutaneous tissues, likely post-surgical, 7mm spiculated RUL nodule with adjacent vague opacity unchanged, and demonstrates no activity, bilateral L>R trochanteric bursitis, calcified uterine fibroid.     Current treatment plan:   Weekly Carboplatin/Paclitaxel + RT to start 24  Durvalumab maintenance    Chief Complaint: Other Misc (NPO)    HPI  63 yo female was having a RUL nodule monitored on CT scans, then developed enlarging right hilar and mediastinal adenopathy. She underwent mediastinoscopy and biopsy with Dr. Collier and pathology  showed poorly differentiated carcinoma, c/w lung primary, PD-L1 85% and KRAS G12C mutated. Patient underwent PET and showed hypermetabolic, enlarged right paratracheal lymph node, and right hilar LN/activity c/w disease. The 7mm lung nodule was not hypermetabolic. Patient reports that the 7mm lung nodule was biopsied last year and was negative. Patient presents to initial clinic with her daughter. She met with radiation oncology Dr. Kay and recommendations are for concurrent chemotherapy and radiation. I discussed findings on PET, biopsies with patient and her daughter. Discussed usual management of Stage III lung cancer and plans for Durvalumab maintenance once chemo/radiation is complete. Patient is a smoker, 1/2 ppd for years, and has cut back recently, has not quit. She did take Wellbutrin but stopped it because she did not like the way it made her feel. She does have a h/o thyroidectomy in the past and is on thyroid medication.     Past Medical History:   Diagnosis Date    Hyperlipidemia     Lymphadenopathy     Pulmonary nodule     Thyroid disease       Past Surgical History:   Procedure Laterality Date    CHOLECYSTECTOMY      COLONOSCOPY      MEDIASTINOSCOPY N/A 8/29/2024    Procedure: MEDIASTINOSCOPY;  Surgeon: Melony Collier MD;  Location: [facility];  Service: Cardiothoracic;  Laterality: N/A;    THYROIDECTOMY, PARTIAL      TUBAL LIGATION       No family history on file.  Social History     Socioeconomic History    Marital status: Single    Years of education: 11   Tobacco Use    Smoking status: Every Day     Current packs/day: 0.50     Average packs/day: 0.5 packs/day for 35.0 years (17.5 ttl pk-yrs)     Types: Cigarettes   Substance and Sexual Activity    Alcohol use: No     Alcohol/week: 0.0 standard drinks of alcohol    Drug use: No    Sexual activity: Yes     Partners: Male       Review of patient's allergies indicates:  No Known Allergies   Current Outpatient Medications on File Prior to Visit  "  Medication Sig Dispense Refill    cholecalciferol, vitamin D3, 1,250 mcg (50,000 unit) capsule Take 50,000 Units by mouth every 7 days.      iron-vitamin C 100-250 mg, ICAR-C, 100-250 mg Tab Take 1 tablet by mouth every other day.      levothyroxine (SYNTHROID) 88 MCG tablet Take 88 mcg by mouth once daily.      LORazepam (ATIVAN) 1 MG tablet Take 1 mg by mouth 2 (two) times daily.      buPROPion (WELLBUTRIN XL) 300 MG 24 hr tablet Take 300 mg by mouth every morning. (Patient not taking: Reported on 9/17/2024)      dicyclomine (BENTYL) 20 mg tablet TAKE 1 TABLET BY MOUTH 4 TIMES DAILY FOR 30 DAYS for 30 (Patient not taking: Reported on 9/17/2024)      gabapentin (NEURONTIN) 300 MG capsule Take 300 mg by mouth. (Patient not taking: Reported on 9/17/2024)      metroNIDAZOLE (FLAGYL) 500 MG tablet Take 500 mg by mouth 2 (two) times a day. (Patient not taking: Reported on 9/17/2024)       No current facility-administered medications on file prior to visit.      Review of Systems   Constitutional:  Negative for appetite change and unexpected weight change.   HENT:  Negative for mouth sores.    Eyes:  Negative for visual disturbance.   Respiratory:  Negative for cough and shortness of breath.    Cardiovascular:  Negative for chest pain.   Gastrointestinal:  Negative for abdominal pain and diarrhea.   Genitourinary:  Negative for frequency.   Musculoskeletal:  Negative for back pain.   Integumentary:  Negative for rash.   Neurological:  Negative for headaches.   Hematological:  Negative for adenopathy.   Psychiatric/Behavioral:  The patient is not nervous/anxious.             Vitals:    09/17/24 1349   BP: 139/71   Pulse: 75   Resp: 14   Temp: 98.2 °F (36.8 °C)   TempSrc: Oral   SpO2: 99%   Weight: 66.5 kg (146 lb 9.6 oz)   Height: 5' 2" (1.575 m)      Physical Exam  Constitutional:       Appearance: Normal appearance.   HENT:      Head: Normocephalic.      Nose: Nose normal.      Mouth/Throat:      Mouth: Mucous " membranes are moist.   Eyes:      Extraocular Movements: Extraocular movements intact.      Conjunctiva/sclera: Conjunctivae normal.   Cardiovascular:      Rate and Rhythm: Normal rate and regular rhythm.   Pulmonary:      Effort: Pulmonary effort is normal.      Breath sounds: Normal breath sounds.   Chest:      Comments: Anterior superior chest with incision from mediastinoscopy healed well  Abdominal:      General: Bowel sounds are normal. There is no distension.      Palpations: Abdomen is soft.      Tenderness: There is no abdominal tenderness.   Musculoskeletal:         General: Normal range of motion.   Skin:     General: Skin is warm.   Neurological:      General: No focal deficit present.      Mental Status: She is alert and oriented to person, place, and time.   Psychiatric:         Mood and Affect: Mood normal.         Judgment: Judgment normal.       Orders Only on 09/05/2024   Component Date Value Ref Range Status    Reason for Study 09/12/2024 To identify somatic and germline mutations relevant to patient's cancer.   Final    Genetic Diseases Assessed 09/12/2024 Cancer   Final    Description of Ranges of DNA Seque* 09/12/2024 648 gene panel   Final    Overall Interpretation 09/12/2024 positive   Final    MSI 09/12/2024 Indeterminate   Final    TMB 09/12/2024 1.6  m/MB Final    [facility] Portal 09/12/2024 [email or web address]   Final    [facility] Portal link    PD-L1 (22C3) Combined Positive Sco* 09/12/2024 85   Final    PD-L1 (22C3) Tumor Proportion Score 09/12/2024 80  % Final    Fusion Addendum Issue Type 09/12/2024    Final                    Value:NEGATIVE  Negative - This addendum is being issued to report that no gene fusions or altered splicing variants from RNA sequencing analysis were found. This report is being issued to report the results of gene rearrangement and altered splicing analysis  from RNA sequencing. No gene rearrangements nor reportable altered splicing events were identified from RNA sequencing.      Low Coverage Regions 09/12/2024 KDM5D   Final    Therapy Count 09/12/2024 2   Final    [facility]: Potential Therapy 1 09/12/2024    Final                    Value:Gene: 6407^KRAS^HGNC  Variant: p.G12C  Agent: Adagrasib  Tissue: Non-Small Cell Lung Cancer  Association: response  Evidence Status: Consensus  Evidence ID: NCCN  Evidence URL:   FDA Approved?: Yes  on label?: No      [facility]: Potential Therapy 2 09/12/2024    Final                    Value:Gene: 6407^KRAS^HGNC  Variant: p.G12C  Agent: Sotorasib  Tissue: Non-Small Cell Lung Cancer  Association: response  Evidence Status: Consensus  Evidence ID: NCCN  Evidence URL:   FDA Approved?: Yes  on label?: No      Trial Count 09/12/2024 3   Final    [facility]: Clinical Trial Match 1 09/12/2024    Final                    Value:Clinical Trial NCT ID: [id number]  Clinical Trial Title: Phase 1/2 Study of [id number] in Patients With Cancer Having a KRAS G12C Mutation DARCI-1  Clinical Trial URL: https://clinicaltrials.gov/ct2/show/UXA67030639  Clinical Phase: Phase 1/Phase 2  Matched criteria: KRAS p.G12C mutation      [facility]: Clinical Trial Match 2 09/12/2024    Final                    Value:Clinical Trial NCT ID: [id number]  Clinical Trial Title: Study of Safety, Pharmacokinetics, and Antitumor Activity of [id number] in Participants With Advanced or Refractory Tumors  Clinical Trial URL: https://clinicaltrials.gov/ct2/show/BIW31652002  Clinical Phase: Phase 1  Matched criteria: KRAS p.G12C mutation      [facility]: Clinical Trial Match 3 09/12/2024    Final                    Value:Clinical Trial NCT ID: [id number]  Clinical Trial Title: Study of [id number] in Cancer Patients With a Specific Genetic Mutation (KRAS G12C)  Clinical Trial URL: https://clinicaltrials.gov/ct2/show/SWP63015039  Clinical Phase: Phase 1/Phase 2  Matched criteria: KRAS p.G12C  mutation     Admission on 08/29/2024, Discharged on 08/29/2024   Component Date Value Ref Range Status    Pathology Result 08/29/2024    Corrected    Comment:          FINAL DIAGNOSIS     1. LYMPH NODE, 2R, LYMPHADENECTOMY:     METASTATIC POORLY DIFFERENTIATED CARCINOMA CONSISTENT WITH LUNG PRIMARY SITE.     2. LYMPH NODE, 2R, LYMPHADENECTOMY:     METASTATIC POORLY DIFFERENTIATED CARCINOMA CONSISTENT WITH LUNG PRIMARY SITE  (SEE SPECIMEN #1).     CODE 9     Note  1. IMMUNOHISTOCHEMICAL STAINS:  CK7:  Positive.  TTF-1:  Positive.  PAX-8, GCDFP.15, KRISTAN-3, CK20, CD30, SOX-10, S-100, p63, Glypican-3,  Pancytokeratin, CD45, CD68:  Negative in tumor cells.  Control reactions are appropriate.        Electronically Signed by:  Jose So M.D. , Pathologist  (Case signed 09/12/2024 at 12:00am)     Comment  This case has also been reviewed in intradepartmental consultation.  Additional  material from specimen 1B will be submitted to [facility] for PD-L1 and Next  Generation Sequencing studies, the results of which will be the subject of an  addendum report.        ADDENDUM REPORT:  This test was referenced to [facility] for testing.     PD-L1                            Expression     Tumor Proportion Score (TPS):  80%  Combined Positive Score (CPS):  85     For additional information, please refer to the complete  report performed by  [facility].     PLEASE NOTE:  A copy of the performing laboratory's report has been scanned  into the  patient record at [facility] and a copy of the  original report can be printed at your request.        ADDENDUM REPORT:  This test was referenced to [facility] for testing.     648 GENE PANEL xT     Accession No.:  [id number]  Diagnosis:  Metastatic poorly differentiated carcinoma, c/w lung primary site  Tumor Specimen:   Lymph node, 2R  Tumor Percentage:  15%  Specimen ID:  [id number]     For additional information, please refer to the complete  report performed  by  [facility].     PLEASE NOTE:  A copy of the performing laboratory's report has been scanned  into the  patient record at [facility] and a copy of the  original report can be printed at your request.                                   ADDENDUM REPORT:  This test was referenced to [facility] for testing.     Transcriptome RNA     Accession No.:  [id number]  Diagnosis:  Metastatic poorly differentiated carcinoma, c/w lung primary site  Tumor Specimen:   Lymph node, 2R  Tumor Percentage:  15%  Specimen ID:  [id number]     For additional information, please refer to the complete  report performed by  [facility].     PLEASE NOTE:  A copy of the performing laboratory's report has been scanned  into the  patient record at [facility] and a copy of the  original report can be printed at your request.        ADDENDUM REPORT:  This test was referenced to [facility] for testing.     Transcriptome xR     Accession No.:  [id number]  Diagnosis:  Metastatic poorly differentiated carcinoma, c/w lung primary site  Tumor Specimen:   Lymph node, 2R  Tumor Percentage:  15%  Specimen ID:  [id number]     For additional information, please refer to the complete  report performed by  [facility].     PLEASE                            NOTE:  A copy of the performing laboratory's report has been scanned  into the  patient record at [facility] and a copy of the  original report can be printed at your request.     Source  1. LYMPH NODE 2R  2. LYMPH NODE 2R FRESH     Clinical Information  LYMPHADENOPATHY  PULMONARY NODULE     Frozen Section  1. POORLY DIFFERENTIATED MALIGNANT NEOPLASM. DEFER TO PERMANENTS/ JS  08:00-08:15     Gross Description  1. Received fresh for frozen section labeled `Valarielele Rodriguez` and listed on the  requisition as `lymph node 2R` are several irregular fragments of pink-tan soft  tissue.  The specimen aggregates to 3.5 x 1.5 x 1.0 cm.  A frozen  section is  performed.  The frozen section remnant is subsequently submitted in cassette  1A.  The remainder the specimen is subsequently submitted in cassettes 1B-1D.  0-F-4  2. Received fresh labeled `Valarie Rodriguez, lymph node` and listed on the  requisition as `2R lymph node` is a portion of pink-tan soft tissue measuring  0.5                            x 0.5 x 0.4 cm.  A portion of the specimen is reserved for microbiology  testing.  The remainder is submitted for routine histology in cassette 2A.  0-1-1     AM 8/29/2024     Microscopic Description  1. Microscopic examination performed.  Please see diagnosis.     Modification Reason  -154937.3: [facility] Transcriptome RNA and xR  -720368.2: [facility] 648 Gene Panel  -257433.1: [facility] PD-L1        Electronically Signed by:  Jose So M.D. , Pathologist  (Amended Report Signed 09/12/2024 at 01:55pm)       AFB Smear 08/29/2024 No AFB seen (Direct smear only)   Final    Anaerobe Culture 08/29/2024 No Anaerobes Isolated   Final    Fungal Culture 08/29/2024 No Fungus Isolated at 2 Weeks   Preliminary    GRAM STAIN 08/29/2024 Moderate WBC observed   Final    GRAM STAIN 08/29/2024 No bacteria seen   Final    Tissue - Aerobic Culture 08/29/2024 No Growth at 5 days   Final   Lab Visit on 08/26/2024   Component Date Value Ref Range Status    Color, UA 08/26/2024 Light-Yellow  Yellow, Light-Yellow, Colorless, Straw, Dark-Yellow Final    Appearance, UA 08/26/2024 Clear  Clear Final    Specific Gravity, UA 08/26/2024 1.012  1.005 - 1.030 Final    pH, UA 08/26/2024 5.0  5.0 - 8.5 Final    Protein, UA 08/26/2024 Negative  Negative Final    Glucose, UA 08/26/2024 Normal  Negative, Normal Final    Ketones, UA 08/26/2024 Negative  Negative Final    Blood, UA 08/26/2024 1+ (A)  Negative Final    Bilirubin, UA 08/26/2024 Negative  Negative Final    Urobilinogen, UA 08/26/2024 Normal  0.2, 1.0, Normal Final    Nitrites, UA 08/26/2024 Negative  Negative Final     Leukocyte Esterase, UA 08/26/2024 500 (A)  Negative Final    RBC, UA 08/26/2024 0-5  None Seen, 0-2, 3-5, 0-5 /HPF Final    WBC, UA 08/26/2024 6-10 (A)  None Seen, 0-2, 3-5, 0-5 /HPF Final    Bacteria, UA 08/26/2024 None Seen  None Seen, Trace /HPF Final    Squamous Epithelial Cells, UA 08/26/2024 Trace  None Seen /HPF Final    Mucous, UA 08/26/2024 Trace (A)  None Seen /LPF Final    Sodium 08/26/2024 140  136 - 145 mmol/L Final    Potassium 08/26/2024 4.6  3.5 - 5.1 mmol/L Final    Chloride 08/26/2024 105  98 - 107 mmol/L Final    CO2 08/26/2024 27  23 - 31 mmol/L Final    Glucose 08/26/2024 90  82 - 115 mg/dL Final    Blood Urea Nitrogen 08/26/2024 15.5  9.8 - 20.1 mg/dL Final    Creatinine 08/26/2024 0.90  0.55 - 1.02 mg/dL Final    Calcium 08/26/2024 10.1  8.4 - 10.2 mg/dL Final    Protein Total 08/26/2024 7.2  5.8 - 7.6 gm/dL Final    Albumin 08/26/2024 3.7  3.4 - 4.8 g/dL Final    Globulin 08/26/2024 3.5  2.4 - 3.5 gm/dL Final    Albumin/Globulin Ratio 08/26/2024 1.1  1.1 - 2.0 ratio Final    Bilirubin Total 08/26/2024 0.3  <=1.5 mg/dL Final    ALP 08/26/2024 72  40 - 150 unit/L Final    ALT 08/26/2024 9  0 - 55 unit/L Final    AST 08/26/2024 14  5 - 34 unit/L Final    eGFR 08/26/2024 >60  mL/min/1.73/m2 Final    Anion Gap 08/26/2024 8.0  mEq/L Final    BUN/Creatinine Ratio 08/26/2024 17   Final    Group & Rh 08/26/2024 A NEG   Final    Indirect Brian GEL 08/26/2024 NEG   Final    Specimen Outdate 08/26/2024 09/05/2024 23:59   Corrected    PTT 08/26/2024 28.2  23.2 - 33.7 seconds Final    For Minimal Heparin Infusion, the goal aPTT 64-85 seconds corresponds to an anti-Xa of 0.3-0.5.    For Low Intensity and High Intensity Heparin, the goal aPTT  seconds corresponds to an anti-Xa of 0.3-0.7    QRS Duration 08/26/2024 88  ms Final    [facility] QTC Calculation 08/26/2024 427  ms Final    WBC 08/26/2024 8.65  4.50 - 11.50 x10(3)/mcL Final    RBC 08/26/2024 4.36  4.20 - 5.40 x10(6)/mcL Final    Hgb 08/26/2024 13.7   12.0 - 16.0 g/dL Final    Hct 08/26/2024 42.2  37.0 - 47.0 % Final    MCV 08/26/2024 96.8 (H)  80.0 - 94.0 fL Final    MCH 08/26/2024 31.4 (H)  27.0 - 31.0 pg Final    MCHC 08/26/2024 32.5 (L)  33.0 - 36.0 g/dL Final    RDW 08/26/2024 13.9  11.5 - 17.0 % Final    Platelet 08/26/2024 159  130 - 400 x10(3)/mcL Final    MPV 08/26/2024 11.1 (H)  7.4 - 10.4 fL Final    Neut % 08/26/2024 70.2  % Final    Lymph % 08/26/2024 23.1  % Final    Mono % 08/26/2024 5.7  % Final    Eos % 08/26/2024 0.1  % Final    Basophil % 08/26/2024 0.6  % Final    Lymph # 08/26/2024 2.00  0.6 - 4.6 x10(3)/mcL Final    Neut # 08/26/2024 6.07  2.1 - 9.2 x10(3)/mcL Final    Mono # 08/26/2024 0.49  0.1 - 1.3 x10(3)/mcL Final    Eos # 08/26/2024 0.01  0 - 0.9 x10(3)/mcL Final    Baso # 08/26/2024 0.05  <=0.2 x10(3)/mcL Final    IG# 08/26/2024 0.03  0 - 0.04 x10(3)/mcL Final    IG% 08/26/2024 0.3  % Final    NRBC% 08/26/2024 0.0  % Final    ABORH Retype 08/26/2024 A NEG   Final         Assessment:       1. Malignant neoplasm of upper lobe of right lung    2. Cancer, metastatic to lung         Plan:       Patient with Stage IIIA Lung cancer, NSCLC TTF-1+, poorly differentiated, not specified if adenocarcinoma or squamous cell carcinoma, [facility] testing with PD-L1 85% and KRAS G12C mutation, diagnosed 8/29/24.   Primary tumor is either occult or may be the 7mm spiculated RUL mass. This was biopsied previously via bronchoscopy and reportedly negative. Will obtain records.  PET shows hypermetabolic right hilar LN and paratracheal LN, and the 7mm RUL lung nodule is not hypermetabolic, though too small to be evaluated by PET.    Will clarify with pathologist that this represents NSCLC and not SCLC.     Case discussed with Dr. Kay.     Per NCCN guidelines, treatment recommended with definitive concurrent chemoradiation (Category 1) followed by Durvalumab maintenance.     Obtain brain MRI for complete staging.  Refer to Dr. Collier for [facility]  placement.     Will schedule patient education.  I discussed the most common and more rare potential side effects of chemotherapy including fatigue, N/V/D/C, and low blood counts. Patient was given information from [facility] on the chemotherapy medications Carboplatin/Paclitaxel.     Discussed with patient high risk of lung cancer for recurrence and will need to monitor post-therapy.     F/u in 2 weeks T/D visit with labs on 9/30/24.  Plan to begin chemotherapy on 10/1/24.     All questions answered at this time.     Smoking cessation strongly encouraged.     I have reviewed records from patient's cancer diagnosis including biopsy/operative reports, pathology reports, imaging studies, and pathology special testing.  Total time spent reviewing records, current NCCN guidelines, as well as face-to-face interaction with patient and her daughter was >60 minutes.       Jeannie Clark MD

## 2024-09-16 ENCOUNTER — TELEPHONE (OUTPATIENT)
Dept: HEMATOLOGY/ONCOLOGY | Facility: CLINIC | Age: 64
End: 2024-09-16
Payer: MEDICARE

## 2024-09-16 NOTE — TELEPHONE ENCOUNTER
Spoke with patient regarding new patient appointment 9/17/24 with Dr. Clark. Patient denied concerns or questions at this time. Confirmed appointment date, time and location with patient.

## 2024-09-17 ENCOUNTER — APPOINTMENT (OUTPATIENT)
Dept: RADIATION THERAPY | Facility: HOSPITAL | Age: 64
End: 2024-09-17
Attending: RADIOLOGY
Payer: MEDICARE

## 2024-09-17 ENCOUNTER — OFFICE VISIT (OUTPATIENT)
Dept: HEMATOLOGY/ONCOLOGY | Facility: CLINIC | Age: 64
End: 2024-09-17
Payer: MEDICARE

## 2024-09-17 ENCOUNTER — CLINICAL SUPPORT (OUTPATIENT)
Dept: HEMATOLOGY/ONCOLOGY | Facility: CLINIC | Age: 64
End: 2024-09-17
Payer: MEDICARE

## 2024-09-17 VITALS
RESPIRATION RATE: 14 BRPM | HEART RATE: 75 BPM | OXYGEN SATURATION: 99 % | DIASTOLIC BLOOD PRESSURE: 71 MMHG | BODY MASS INDEX: 26.98 KG/M2 | WEIGHT: 146.63 LBS | TEMPERATURE: 98 F | SYSTOLIC BLOOD PRESSURE: 139 MMHG | HEIGHT: 62 IN

## 2024-09-17 DIAGNOSIS — C78.00: ICD-10-CM

## 2024-09-17 DIAGNOSIS — C34.11 MALIGNANT NEOPLASM OF UPPER LOBE OF RIGHT LUNG: Primary | ICD-10-CM

## 2024-09-17 PROCEDURE — 1101F PT FALLS ASSESS-DOCD LE1/YR: CPT | Mod: CPTII,S$GLB,, | Performed by: INTERNAL MEDICINE

## 2024-09-17 PROCEDURE — 3075F SYST BP GE 130 - 139MM HG: CPT | Mod: CPTII,S$GLB,, | Performed by: INTERNAL MEDICINE

## 2024-09-17 PROCEDURE — 1126F AMNT PAIN NOTED NONE PRSNT: CPT | Mod: CPTII,S$GLB,, | Performed by: INTERNAL MEDICINE

## 2024-09-17 PROCEDURE — 77334 RADIATION TREATMENT AID(S): CPT | Performed by: RADIOLOGY

## 2024-09-17 PROCEDURE — 3008F BODY MASS INDEX DOCD: CPT | Mod: CPTII,S$GLB,, | Performed by: INTERNAL MEDICINE

## 2024-09-17 PROCEDURE — 77332 RADIATION TREATMENT AID(S): CPT | Performed by: RADIOLOGY

## 2024-09-17 PROCEDURE — 3078F DIAST BP <80 MM HG: CPT | Mod: CPTII,S$GLB,, | Performed by: INTERNAL MEDICINE

## 2024-09-17 PROCEDURE — 99205 OFFICE O/P NEW HI 60 MIN: CPT | Mod: S$GLB,,, | Performed by: INTERNAL MEDICINE

## 2024-09-17 PROCEDURE — 99999 PR PBB SHADOW E&M-EST. PATIENT-LVL V: CPT | Mod: PBBFAC,,, | Performed by: INTERNAL MEDICINE

## 2024-09-17 PROCEDURE — 1160F RVW MEDS BY RX/DR IN RCRD: CPT | Mod: CPTII,S$GLB,, | Performed by: INTERNAL MEDICINE

## 2024-09-17 PROCEDURE — 1159F MED LIST DOCD IN RCRD: CPT | Mod: CPTII,S$GLB,, | Performed by: INTERNAL MEDICINE

## 2024-09-17 PROCEDURE — 3288F FALL RISK ASSESSMENT DOCD: CPT | Mod: CPTII,S$GLB,, | Performed by: INTERNAL MEDICINE

## 2024-09-17 RX ORDER — LORAZEPAM 1 MG/1
1 TABLET ORAL 2 TIMES DAILY
COMMUNITY
Start: 2024-09-06

## 2024-09-17 NOTE — NURSING
Oncology Navigation   Intake  Cancer Type: Thoracic  Initial Nurse Navigator Contact: 09/17/24     Treatment  Current Status: Staging work-up       Medical Oncologist: Eduardo  Consult Date: 09/17/24  Chemotherapy: Planned    Radiation Therapy: Planned                          Follow Up  No follow-ups on file.     Met with patient and daughter today following new patient appointment with Dr. Clark. Introduced myself and role of navigator. Patient is relieved after appointment and is happy to have a plan in place. She denies anxiety/depression but does have financial concerns specifically with copays. Message sent to Financial Counselor. Will have other resources ready for patient at education visit.

## 2024-09-23 ENCOUNTER — TELEPHONE (OUTPATIENT)
Dept: HEMATOLOGY/ONCOLOGY | Facility: CLINIC | Age: 64
End: 2024-09-23
Payer: MEDICARE

## 2024-09-23 NOTE — TELEPHONE ENCOUNTER
Received voicemail from patient requesting a call back. Attempted to call back, no answer, VM left.

## 2024-09-24 ENCOUNTER — LAB VISIT (OUTPATIENT)
Dept: LAB | Facility: HOSPITAL | Age: 64
End: 2024-09-24
Attending: INTERNAL MEDICINE
Payer: MEDICARE

## 2024-09-24 ENCOUNTER — OFFICE VISIT (OUTPATIENT)
Dept: HEMATOLOGY/ONCOLOGY | Facility: CLINIC | Age: 64
End: 2024-09-24
Payer: MEDICARE

## 2024-09-24 ENCOUNTER — CLINICAL SUPPORT (OUTPATIENT)
Dept: HEMATOLOGY/ONCOLOGY | Facility: CLINIC | Age: 64
End: 2024-09-24
Payer: MEDICARE

## 2024-09-24 VITALS
BODY MASS INDEX: 26.5 KG/M2 | TEMPERATURE: 98 F | HEIGHT: 62 IN | DIASTOLIC BLOOD PRESSURE: 82 MMHG | WEIGHT: 144 LBS | HEART RATE: 68 BPM | SYSTOLIC BLOOD PRESSURE: 145 MMHG | OXYGEN SATURATION: 99 %

## 2024-09-24 DIAGNOSIS — C78.00: ICD-10-CM

## 2024-09-24 DIAGNOSIS — C34.11 MALIGNANT NEOPLASM OF UPPER LOBE OF RIGHT LUNG: ICD-10-CM

## 2024-09-24 DIAGNOSIS — C34.11 MALIGNANT NEOPLASM OF UPPER LOBE OF RIGHT LUNG: Primary | ICD-10-CM

## 2024-09-24 LAB
ALBUMIN SERPL-MCNC: 3.5 G/DL (ref 3.4–4.8)
ALBUMIN/GLOB SERPL: 1 RATIO (ref 1.1–2)
ALP SERPL-CCNC: 67 UNIT/L (ref 40–150)
ALT SERPL-CCNC: 7 UNIT/L (ref 0–55)
ANION GAP SERPL CALC-SCNC: 8 MEQ/L
AST SERPL-CCNC: 14 UNIT/L (ref 5–34)
BASOPHILS # BLD AUTO: 0.04 X10(3)/MCL
BASOPHILS NFR BLD AUTO: 0.5 %
BILIRUB SERPL-MCNC: 0.4 MG/DL
BUN SERPL-MCNC: 17.2 MG/DL (ref 9.8–20.1)
CALCIUM SERPL-MCNC: 9.8 MG/DL (ref 8.4–10.2)
CHLORIDE SERPL-SCNC: 105 MMOL/L (ref 98–107)
CO2 SERPL-SCNC: 28 MMOL/L (ref 23–31)
CREAT SERPL-MCNC: 0.86 MG/DL (ref 0.55–1.02)
CREAT/UREA NIT SERPL: 20
EOSINOPHIL # BLD AUTO: 0.04 X10(3)/MCL (ref 0–0.9)
EOSINOPHIL NFR BLD AUTO: 0.5 %
ERYTHROCYTE [DISTWIDTH] IN BLOOD BY AUTOMATED COUNT: 13.4 % (ref 11.5–17)
GFR SERPLBLD CREATININE-BSD FMLA CKD-EPI: >60 ML/MIN/1.73/M2
GLOBULIN SER-MCNC: 3.5 GM/DL (ref 2.4–3.5)
GLUCOSE SERPL-MCNC: 79 MG/DL (ref 82–115)
HCT VFR BLD AUTO: 41.2 % (ref 37–47)
HGB BLD-MCNC: 13.6 G/DL (ref 12–16)
IMM GRANULOCYTES # BLD AUTO: 0.01 X10(3)/MCL (ref 0–0.04)
IMM GRANULOCYTES NFR BLD AUTO: 0.1 %
LYMPHOCYTES # BLD AUTO: 2.17 X10(3)/MCL (ref 0.6–4.6)
LYMPHOCYTES NFR BLD AUTO: 29.5 %
MAGNESIUM SERPL-MCNC: 2.2 MG/DL (ref 1.6–2.6)
MCH RBC QN AUTO: 32.2 PG (ref 27–31)
MCHC RBC AUTO-ENTMCNC: 33 G/DL (ref 33–36)
MCV RBC AUTO: 97.4 FL (ref 80–94)
MONOCYTES # BLD AUTO: 0.58 X10(3)/MCL (ref 0.1–1.3)
MONOCYTES NFR BLD AUTO: 7.9 %
NEUTROPHILS # BLD AUTO: 4.51 X10(3)/MCL (ref 2.1–9.2)
NEUTROPHILS NFR BLD AUTO: 61.5 %
PLATELET # BLD AUTO: 345 X10(3)/MCL (ref 130–400)
PMV BLD AUTO: 9.3 FL (ref 7.4–10.4)
POTASSIUM SERPL-SCNC: 4.5 MMOL/L (ref 3.5–5.1)
PROT SERPL-MCNC: 7 GM/DL (ref 5.8–7.6)
RBC # BLD AUTO: 4.23 X10(6)/MCL (ref 4.2–5.4)
SODIUM SERPL-SCNC: 141 MMOL/L (ref 136–145)
WBC # BLD AUTO: 7.35 X10(3)/MCL (ref 4.5–11.5)

## 2024-09-24 PROCEDURE — 1126F AMNT PAIN NOTED NONE PRSNT: CPT | Mod: CPTII,S$GLB,,

## 2024-09-24 PROCEDURE — 99999 PR PBB SHADOW E&M-EST. PATIENT-LVL I: CPT | Mod: PBBFAC,,, | Performed by: SOCIAL WORKER

## 2024-09-24 PROCEDURE — 3288F FALL RISK ASSESSMENT DOCD: CPT | Mod: CPTII,S$GLB,,

## 2024-09-24 PROCEDURE — 85025 COMPLETE CBC W/AUTO DIFF WBC: CPT

## 2024-09-24 PROCEDURE — 3008F BODY MASS INDEX DOCD: CPT | Mod: CPTII,S$GLB,,

## 2024-09-24 PROCEDURE — 83735 ASSAY OF MAGNESIUM: CPT

## 2024-09-24 PROCEDURE — 1159F MED LIST DOCD IN RCRD: CPT | Mod: CPTII,S$GLB,,

## 2024-09-24 PROCEDURE — 80053 COMPREHEN METABOLIC PANEL: CPT

## 2024-09-24 PROCEDURE — 1101F PT FALLS ASSESS-DOCD LE1/YR: CPT | Mod: CPTII,S$GLB,,

## 2024-09-24 PROCEDURE — 3079F DIAST BP 80-89 MM HG: CPT | Mod: CPTII,S$GLB,,

## 2024-09-24 PROCEDURE — 36415 COLL VENOUS BLD VENIPUNCTURE: CPT

## 2024-09-24 PROCEDURE — 3077F SYST BP >= 140 MM HG: CPT | Mod: CPTII,S$GLB,,

## 2024-09-24 PROCEDURE — 99215 OFFICE O/P EST HI 40 MIN: CPT | Mod: S$GLB,,,

## 2024-09-24 PROCEDURE — 99999 PR PBB SHADOW E&M-EST. PATIENT-LVL I: CPT | Mod: PBBFAC,,,

## 2024-09-24 PROCEDURE — 99999 PR PBB SHADOW E&M-EST. PATIENT-LVL IV: CPT | Mod: PBBFAC,,,

## 2024-09-24 NOTE — PROGRESS NOTES
Subjective     Patient ID: Valarie Rodriguez is a 64 y.o. female.    Chief Complaint: No chief complaint on file.    HPI  Review of Systems   Constitutional:  Negative for appetite change and unexpected weight change.   HENT:  Negative for mouth sores.    Eyes:  Negative for visual disturbance.   Respiratory:  Negative for cough and shortness of breath.    Cardiovascular:  Negative for chest pain.   Gastrointestinal:  Negative for abdominal pain and diarrhea.   Genitourinary:  Negative for frequency.   Musculoskeletal:  Negative for back pain.   Integumentary:  Negative for rash.   Neurological:  Negative for headaches.   Hematological:  Negative for adenopathy.   Psychiatric/Behavioral:  The patient is not nervous/anxious.           Objective     Physical Exam       Assessment and Plan         I met with Valarie, introduced myself, assessed her distress and discussed barriers. She is uncertain she will do treatment. We discussed her hesitations. We also discussed smoking cessation. I will follow up in two weeks regarding smoking cessation. She reports to smoke approximately 4-5 cigarettes a day. She does not have a date she has chosen to quit.         Follow up in about 2 weeks (around 10/8/2024).

## 2024-09-24 NOTE — PROGRESS NOTES
"Oncology Nutrition Evaluation      Valarie Rodriguez   1960    Oncology Provider:   Jeannie Clark MD    Reason for Visit:  New Treatment Education    Oncology/Hematology Diagnosis:   NSCLC Stage IIIA ([id number])--Diagnosed 8/29/24     Treatment Plan:  Carboplatin/Paclitaxel + RT to start 9/30/24     Treatment History:  None     Nutrition Recommendations:  1. Regular diet as tolerated    Nutrition Assessment    9/24/24: This is a 64 y.o.female with a medical diagnosis of lung CA. She reports good appetite and po intake. No c/o n/v/c/d. Wt has been stable.     Nutrition Factors Affecting Intake  none identified    PMHx: HLD, arslan    Allergies: Patient has no known allergies.    Current Medications:    Current Outpatient Medications:     buPROPion (WELLBUTRIN XL) 300 MG 24 hr tablet, Take 300 mg by mouth every morning. (Patient not taking: Reported on 9/17/2024), Disp: , Rfl:     cholecalciferol, vitamin D3, 1,250 mcg (50,000 unit) capsule, Take 50,000 Units by mouth every 7 days., Disp: , Rfl:     dicyclomine (BENTYL) 20 mg tablet, TAKE 1 TABLET BY MOUTH 4 TIMES DAILY FOR 30 DAYS for 30 (Patient not taking: Reported on 9/17/2024), Disp: , Rfl:     gabapentin (NEURONTIN) 300 MG capsule, Take 300 mg by mouth. (Patient not taking: Reported on 9/17/2024), Disp: , Rfl:     iron-vitamin C 100-250 mg, ICAR-C, 100-250 mg Tab, Take 1 tablet by mouth every other day., Disp: , Rfl:     levothyroxine (SYNTHROID) 88 MCG tablet, Take 88 mcg by mouth once daily., Disp: , Rfl:     LORazepam (ATIVAN) 1 MG tablet, Take 1 mg by mouth 2 (two) times daily., Disp: , Rfl:     Labs: 9/24/24 WNL    Anthropometrics    Height:   Ht Readings from Last 1 Encounters:   09/24/24 5' 2" (1.575 m)      Weight:   Wt Readings from Last 1 Encounters:   09/24/24 65.3 kg (144 lb)        Usual Body Weight: 65.3 kg (144 lb)  % Weight Change: 0    BMI: 26.3 (overweight)    Ideal Weight: 50 kg (110 lb)  % Ideal Weight: 131%      Nutrition Diagnosis    PES: " Food and nutrition related knowledge deficit related to chronic illness as evidenced by lack of prior exposure to oncology nutrition. (resolved)     Nutrition Risk  low    Nutrition Intervention    Interventions(treatment strategy):  general/healthful diet and purpose of nutrition education    Education Provided: food safety guidelines and general healthy diet  Teaching Method: explanation and printed materials  Comprehension: verbalizes understanding  Barriers to Learning: none evident  Expected Compliance: good  Comments: All questions were answered and dietitian's contact information was provided.      Nutrition Monitoring and Evaluation    Ongoing monitoring not warranted at this time. Please consult RD prn.        Kandy Anand, MS, RD, , LDN

## 2024-09-24 NOTE — PROGRESS NOTES
THERAPY EDUCATION: WEEKLY CARBOPLATIN + PACLITAXEL    Subjective:      Patient ID: Valarie Rodriguez is a 64 y.o. female.    NSCLC Stage IIIA ([id number])--Diagnosed 24  Biopsy/pathology: Cervical mediastinoscopy done 24--multiple biopsies taken of 2R (upper paratracheal) LN and pathology showed poorly differentiated carcinoma, c/w lung primary site, PD-L1 TPS 80%, CPS 85%, KRAS G12C mutation (approved therapies Adagrasib or Sotorasib), TMB 1.6 (low), MSI cannot be assessed.  Imagin. CT chest w/o contrast 23--7mm RUL spiculated nodule again seen and similar in appearance to comparison, suspicious for neoplasia, mediastinal lymph nodes similar in comparison, emphysema is noted.  2. CT chest w/o contrast done 2/15/24--stable RUL spiculated nodule, development of right hilar adenopathy, enlarged precarinal LN, increased in size compared to previous exam, PET/CT should be considered, emphysema.   3. CT chest w/o contrast 24--stable RUL spiculated nodule measures 7mm, stable small ground glass densities, enlarged precarinal LN measures 2.7X1.9cm, increased in size, right hilar LN vs. Mass measures 2.3X1.6cm, stable, PET should be considered, emphysema.   4. PET/CT done 24--hypermetabolic enlarged right paratracheal LN and prominent right hilum with slightly elevated activity c/w neoplasm, elevated activity of anterior thyroid bed and subcutaneous tissues, likely post-surgical, 7mm spiculated RUL nodule with adjacent vague opacity unchanged, and demonstrates no activity, bilateral L>R trochanteric bursitis, calcified uterine fibroid.     Current treatment plan:   Weekly Carboplatin/Paclitaxel + RT to start 10/1/24  Durvalumab maintenance    Chief Complaint: Therapy Education     HPI  65 yo female was having a RUL nodule monitored on CT scans, then developed enlarging right hilar and mediastinal adenopathy. She underwent mediastinoscopy and biopsy with Dr. Collier and pathology showed poorly  "differentiated carcinoma, c/w lung primary, PD-L1 85% and KRAS G12C mutated. Patient underwent PET and showed hypermetabolic, enlarged right paratracheal lymph node, and right hilar LN/activity c/w disease. The 7mm lung nodule was not hypermetabolic. Patient reports that the 7mm lung nodule was biopsied last year and was negative. Patient presents to initial clinic with her daughter. She met with radiation oncology Dr. Kay and recommendations are for concurrent chemotherapy and radiation. I discussed findings on PET, biopsies with patient and her daughter. Discussed usual management of Stage III lung cancer and plans for Durvalumab maintenance once chemo/radiation is complete. Patient is a smoker, 1/2 ppd for years, and has cut back recently, has not quit. She did take Wellbutrin but stopped it because she did not like the way it made her feel. She does have a h/o thyroidectomy in the past and is on thyroid medication.     Interval History    9/24/24: Ms. Rodriguez presents to the clinic by herself for therapy education. Patient states that she is hesitant about treatment and "doesn't think she wants to go along with treatment". Denies fever, chills, SOB, N/V/D, constipation, recent infection, chest pain or unexplained bleeding or bruising.      Past Medical History:   Diagnosis Date    Hyperlipidemia     Lymphadenopathy     Pulmonary nodule     Thyroid disease       Past Surgical History:   Procedure Laterality Date    CHOLECYSTECTOMY      COLONOSCOPY      MEDIASTINOSCOPY N/A 8/29/2024    Procedure: MEDIASTINOSCOPY;  Surgeon: Melony Collier MD;  [facility];  Service: Cardiothoracic;  Laterality: N/A;    THYROIDECTOMY, PARTIAL      TUBAL LIGATION       No family history on file.  Social History     Socioeconomic History    Marital status: Single    Years of education: 11   Tobacco Use    Smoking status: Every Day     Current packs/day: 0.50     Average packs/day: 0.5 packs/day for 35.0 years (17.5 ttl " pk-yrs)     Types: Cigarettes   Substance and Sexual Activity    Alcohol use: No     Alcohol/week: 0.0 standard drinks of alcohol    Drug use: No    Sexual activity: Yes     Partners: Male       Review of patient's allergies indicates:  No Known Allergies   Current Outpatient Medications on File Prior to Visit   Medication Sig Dispense Refill    buPROPion (WELLBUTRIN XL) 300 MG 24 hr tablet Take 300 mg by mouth every morning. (Patient not taking: Reported on 9/17/2024)      cholecalciferol, vitamin D3, 1,250 mcg (50,000 unit) capsule Take 50,000 Units by mouth every 7 days.      dicyclomine (BENTYL) 20 mg tablet TAKE 1 TABLET BY MOUTH 4 TIMES DAILY FOR 30 DAYS for 30 (Patient not taking: Reported on 9/17/2024)      gabapentin (NEURONTIN) 300 MG capsule Take 300 mg by mouth. (Patient not taking: Reported on 9/17/2024)      iron-vitamin C 100-250 mg, ICAR-C, 100-250 mg Tab Take 1 tablet by mouth every other day.      levothyroxine (SYNTHROID) 88 MCG tablet Take 88 mcg by mouth once daily.      LORazepam (ATIVAN) 1 MG tablet Take 1 mg by mouth 2 (two) times daily.      metroNIDAZOLE (FLAGYL) 500 MG tablet Take 500 mg by mouth 2 (two) times a day. (Patient not taking: Reported on 9/17/2024)       No current facility-administered medications on file prior to visit.      Review of Systems   Constitutional:  Negative for appetite change and unexpected weight change.   HENT:  Negative for mouth sores.    Eyes:  Negative for visual disturbance.   Respiratory:  Negative for cough and shortness of breath.    Cardiovascular:  Negative for chest pain.   Gastrointestinal:  Negative for abdominal pain and diarrhea.   Genitourinary:  Negative for frequency.   Musculoskeletal:  Negative for back pain.   Integumentary:  Negative for rash.   Neurological:  Negative for headaches.   Hematological:  Negative for adenopathy.   Psychiatric/Behavioral:  The patient is not nervous/anxious.        Vitals:    09/24/24 0950   BP: (!) 145/82  "  Pulse: 68   Temp: 98.1 °F (36.7 °C)   TempSrc: Oral   SpO2: 99%   Weight: 65.3 kg (144 lb)   Height: 5' 2" (1.575 m)          Orders Only on 09/05/2024   Component Date Value Ref Range Status    Reason for Study 09/12/2024 To identify somatic and germline mutations relevant to patient's cancer.   Final    Genetic Diseases Assessed 09/12/2024 Cancer   Final    Description of Ranges of DNA Seque* 09/12/2024 648 gene panel   Final    Overall Interpretation 09/12/2024 positive   Final    MSI 09/12/2024 Indeterminate   Final    TMB 09/12/2024 1.6  m/MB Final    [facility] Portal 09/12/2024 [email or web address]   Final    [facility] Portal link    PD-L1 (22C3) Combined Positive Sco* 09/12/2024 85   Final    PD-L1 (22C3) Tumor Proportion Score 09/12/2024 80  % Final    Fusion Addendum Issue Type 09/12/2024    Final                    Value:NEGATIVE  Negative - This addendum is being issued to report that no gene fusions or altered splicing variants from RNA sequencing analysis were found. This report is being issued to report the results of gene rearrangement and altered splicing analysis from RNA sequencing. No gene rearrangements nor reportable altered splicing events were identified from RNA sequencing.      Low Coverage Regions 09/12/2024 KDM5D   Final    Therapy Count 09/12/2024 2   Final    [facility]: Potential Therapy 1 09/12/2024    Final                    Value:Gene: 6407^KRAS^HGNC  Variant: p.G12C  Agent: Adagrasib  Tissue: Non-Small Cell Lung Cancer  Association: response  Evidence Status: Consensus  Evidence ID: NCCN  Evidence URL:   FDA Approved?: Yes  on label?: No      [facility]: Potential Therapy 2 09/12/2024    Final                    Value:Gene: 6407^KRAS^HGNC  Variant: p.G12C  Agent: Sotorasib  Tissue: Non-Small Cell Lung Cancer  Association: response  Evidence Status: Consensus  Evidence ID: " NCCN  Evidence URL:   FDA Approved?: Yes  on label?: No      Trial Count 09/12/2024 3   Final    [facility]: Clinical Trial Match 1 09/12/2024    Final                    Value:Clinical Trial NCT ID: [id number]  Clinical Trial Title: Phase 1/2 Study of [id number] in Patients With Cancer Having a KRAS G12C Mutation DARCI-1  Clinical Trial URL: https://clinicaltrials.gov/ct2/show/EFM18949062  Clinical Phase: Phase 1/Phase 2  Matched criteria: KRAS p.G12C mutation      [facility]: Clinical Trial Match 2 09/12/2024    Final                    Value:Clinical Trial NCT ID: [id number]  Clinical Trial Title: Study of Safety, Pharmacokinetics, and Antitumor Activity of BGB-3245 in Participants With Advanced or Refractory Tumors  Clinical Trial URL: https://clinicaltrials.gov/ct2/show/MPR77123301  Clinical Phase: Phase 1  Matched criteria: KRAS p.G12C mutation      [facility]: Clinical Trial Match 3 09/12/2024    Final                    Value:Clinical Trial NCT ID: [id number]  Clinical Trial Title: Study of [id number] in Cancer Patients With a Specific Genetic Mutation (KRAS G12C)  Clinical Trial URL: https://clinicaltrials.gov/ct2/show/KFA09605790  Clinical Phase: Phase 1/Phase 2  Matched criteria: KRAS p.G12C mutation     Admission on 08/29/2024, Discharged on 08/29/2024   Component Date Value Ref Range Status    Pathology Result 08/29/2024    Corrected    Comment:          FINAL DIAGNOSIS     1. LYMPH NODE, 2R, LYMPHADENECTOMY:     METASTATIC POORLY DIFFERENTIATED NON-SMALL CELL CARCINOMA CONSISTENT WITH LUNG  PRIMARY SITE.     2. LYMPH NODE, 2R, LYMPHADENECTOMY:     METASTATIC POORLY DIFFERENTIATED NON-SMALL CELL CARCINOMA CONSISTENT WITH LUNG  PRIMARY SITE (SEE SPECIMEN #1).     CODE 9     Note  1. IMMUNOHISTOCHEMICAL STAINS:  CK7:  Positive.  TTF-1:  Positive.  PAX-8, GCDFP.15, KRISTAN-3, CK20, CD30, SOX-10, S-100, p63, Glypican-3,  Pancytokeratin, CD45, CD68:  Negative in tumor cells.  Control reactions are appropriate.         Electronically Signed by:  Jose So M.D. , Pathologist  (Case signed 09/12/2024 at 12:00am)     Comment  This case has also been reviewed in intradepartmental consultation.  Additional  material from specimen 1B will be submitted to [facility] for PD-L1 and Next  Generation Sequencing studies, the results of which will be the subject of an  addendum report.        ADDENDUM REPORT:  This test was referenced to [facility]                            for testing.     PD-L1 Expression     Tumor Proportion Score (TPS):  80%  Combined Positive Score (CPS):  85     For additional information, please refer to the complete  report performed by  [facility].     PLEASE NOTE:  A copy of the performing laboratory's report has been scanned  into the  patient record at [facility] and a copy of the  original report can be printed at your request.        ADDENDUM REPORT:  This test was referenced to [facility] for testing.     648 GENE PANEL xT     Accession No.:  [id number]  Diagnosis:  Metastatic poorly differentiated carcinoma, c/w lung primary site  Tumor Specimen:   Lymph node, 2R  Tumor Percentage:  15%  Specimen ID:  [id number]     For additional information, please refer to the complete  report performed by  [facility].     PLEASE NOTE:  A copy of the performing laboratory's report has been scanned  into the  patient record at [facility] and a copy of the  original report can be printed                            at your request.        ADDENDUM REPORT:  This test was referenced to [facility] for testing.     Transcriptome RNA     Accession No.:  [id number]  Diagnosis:  Metastatic poorly differentiated carcinoma, c/w lung primary site  Tumor Specimen:   Lymph node, 2R  Tumor Percentage:  15%  Specimen ID:  [id number]     For additional information, please refer to the complete  report performed by  [facility].     PLEASE NOTE:  A copy of the performing laboratory's report  has been scanned  into the  patient record at [facility] and a copy of the  original report can be printed at your request.        ADDENDUM REPORT:  This test was referenced to [facility] for testing.     [facility] xR     Accession No.:  [id number]  Diagnosis:  Metastatic poorly differentiated carcinoma, c/w lung primary site  Tumor Specimen:   Lymph node, 2R  Tumor Percentage:  15%  Specimen ID:  [id number]     For additional information, please refer to the complete  report performed                            by  [facility].     PLEASE NOTE:  A copy of the performing laboratory's report has been scanned  into the  patient record at [facility] and a copy of the  original report can be printed at your request.     Source  1. LYMPH NODE 2R  2. LYMPH NODE 2R FRESH     Clinical Information  LYMPHADENOPATHY  PULMONARY NODULE     Frozen Section  1. POORLY DIFFERENTIATED MALIGNANT NEOPLASM. DEFER TO PERMANENTS/   08:00-08:15     Gross Description  1. Received fresh for frozen section labeled `Valarie Rodriguez` and listed on the  requisition as `lymph node 2R` are several irregular fragments of pink-tan soft  tissue.  The specimen aggregates to 3.5 x 1.5 x 1.0 cm.  A frozen section is  performed.  The frozen section remnant is subsequently submitted in cassette  1A.  The remainder the specimen is subsequently submitted in cassettes 1B-1D.  0-F-4  2. Received fresh labeled `Valarie Rodriguez, lymph node` and listed on the  requisition as `2R lymph node` is a portion of pink-tan soft                            tissue measuring  0.5 x 0.5 x 0.4 cm.  A portion of the specimen is reserved for microbiology  testing.  The remainder is submitted for routine histology in cassette 2A.  0-1-1     AM 8/29/2024     Microscopic Description  1. Microscopic examination performed.  Please see diagnosis.     Modification Reason  -800758.4: Addendum - dx  -347339.3: [facility]  Transcriptome RNA and xR  -934314.2: [facility] 648 Gene Panel  -728249.1: [facility] PD-L1        Electronically Signed by:  Jose So M.D. , Pathologist  (Amended Report Signed 09/23/2024 at 02:15pm)       AFB Smear 08/29/2024 No AFB seen (Direct smear only)   Final    Anaerobe Culture 08/29/2024 No Anaerobes Isolated   Final    Fungal Culture 08/29/2024 No Fungus Isolated at 2 Weeks   Preliminary    GRAM STAIN 08/29/2024 Moderate WBC observed   Final    GRAM STAIN 08/29/2024 No bacteria seen   Final    Tissue - Aerobic Culture 08/29/2024 No Growth at 5 days   Final   Lab Visit on 08/26/2024   Component Date Value Ref Range Status    Color, UA 08/26/2024 Light-Yellow  Yellow, Light-Yellow, Colorless, Straw, Dark-Yellow Final    Appearance, UA 08/26/2024 Clear  Clear Final    Specific Gravity, UA 08/26/2024 1.012  1.005 - 1.030 Final    pH, UA 08/26/2024 5.0  5.0 - 8.5 Final    Protein, UA 08/26/2024 Negative  Negative Final    Glucose, UA 08/26/2024 Normal  Negative, Normal Final    Ketones, UA 08/26/2024 Negative  Negative Final    Blood, UA 08/26/2024 1+ (A)  Negative Final    Bilirubin, UA 08/26/2024 Negative  Negative Final    Urobilinogen, UA 08/26/2024 Normal  0.2, 1.0, Normal Final    Nitrites, UA 08/26/2024 Negative  Negative Final    Leukocyte Esterase, UA 08/26/2024 500 (A)  Negative Final    RBC, UA 08/26/2024 0-5  None Seen, 0-2, 3-5, 0-5 /HPF Final    WBC, UA 08/26/2024 6-10 (A)  None Seen, 0-2, 3-5, 0-5 /HPF Final    Bacteria, UA 08/26/2024 None Seen  None Seen, Trace /HPF Final    Squamous Epithelial Cells, UA 08/26/2024 Trace  None Seen /HPF Final    Mucous, UA 08/26/2024 Trace (A)  None Seen /LPF Final    Sodium 08/26/2024 140  136 - 145 mmol/L Final    Potassium 08/26/2024 4.6  3.5 - 5.1 mmol/L Final    Chloride 08/26/2024 105  98 - 107 mmol/L Final    CO2 08/26/2024 27  23 - 31 mmol/L Final    Glucose 08/26/2024 90  82 - 115 mg/dL Final    Blood Urea Nitrogen 08/26/2024 15.5  9.8 -  20.1 mg/dL Final    Creatinine 08/26/2024 0.90  0.55 - 1.02 mg/dL Final    Calcium 08/26/2024 10.1  8.4 - 10.2 mg/dL Final    Protein Total 08/26/2024 7.2  5.8 - 7.6 gm/dL Final    Albumin 08/26/2024 3.7  3.4 - 4.8 g/dL Final    Globulin 08/26/2024 3.5  2.4 - 3.5 gm/dL Final    Albumin/Globulin Ratio 08/26/2024 1.1  1.1 - 2.0 ratio Final    Bilirubin Total 08/26/2024 0.3  <=1.5 mg/dL Final    ALP 08/26/2024 72  40 - 150 unit/L Final    ALT 08/26/2024 9  0 - 55 unit/L Final    AST 08/26/2024 14  5 - 34 unit/L Final    eGFR 08/26/2024 >60  mL/min/1.73/m2 Final    Anion Gap 08/26/2024 8.0  mEq/L Final    BUN/Creatinine Ratio 08/26/2024 17   Final    Group & Rh 08/26/2024 A NEG   Final    Indirect Brian GEL 08/26/2024 NEG   Final    Specimen Outdate 08/26/2024 09/05/2024 23:59   Corrected    PTT 08/26/2024 28.2  23.2 - 33.7 seconds Final    For Minimal Heparin Infusion, the goal aPTT 64-85 seconds corresponds to an anti-Xa of 0.3-0.5.    For Low Intensity and High Intensity Heparin, the goal aPTT  seconds corresponds to an anti-Xa of 0.3-0.7    QRS Duration 08/26/2024 88  ms Final    [facility]HS QTC Calculation 08/26/2024 427  ms Final    WBC 08/26/2024 8.65  4.50 - 11.50 x10(3)/mcL Final    RBC 08/26/2024 4.36  4.20 - 5.40 x10(6)/mcL Final    Hgb 08/26/2024 13.7  12.0 - 16.0 g/dL Final    Hct 08/26/2024 42.2  37.0 - 47.0 % Final    MCV 08/26/2024 96.8 (H)  80.0 - 94.0 fL Final    MCH 08/26/2024 31.4 (H)  27.0 - 31.0 pg Final    MCHC 08/26/2024 32.5 (L)  33.0 - 36.0 g/dL Final    RDW 08/26/2024 13.9  11.5 - 17.0 % Final    Platelet 08/26/2024 159  130 - 400 x10(3)/mcL Final    MPV 08/26/2024 11.1 (H)  7.4 - 10.4 fL Final    Neut % 08/26/2024 70.2  % Final    Lymph % 08/26/2024 23.1  % Final    Mono % 08/26/2024 5.7  % Final    Eos % 08/26/2024 0.1  % Final    Basophil % 08/26/2024 0.6  % Final    Lymph # 08/26/2024 2.00  0.6 - 4.6 x10(3)/mcL Final    Neut # 08/26/2024 6.07  2.1 - 9.2 x10(3)/mcL Final    Mono # 08/26/2024  0.49  0.1 - 1.3 x10(3)/mcL Final    Eos # 08/26/2024 0.01  0 - 0.9 x10(3)/mcL Final    Baso # 08/26/2024 0.05  <=0.2 x10(3)/mcL Final    IG# 08/26/2024 0.03  0 - 0.04 x10(3)/mcL Final    IG% 08/26/2024 0.3  % Final    NRBC% 08/26/2024 0.0  % Final    ABORH Retype 08/26/2024 A NEG   Final       Assessment:   NSCLC Stage IIIA (Y6pW3C2)--Diagnosed 8/29/24  Plan:   -Therapy education completed on 9/24/24.  -Plans to start weekly Carboplatin and Paclitaxel concurrently with radiation tentatively on 10/1/24. Patient understood that she will receive premedications given to her 30 minutes prior to each treatment to help prevent nausea.   -Mediport placement awaiting to be scheduled. Patient has appt with Dr. Collier scheduled for 9/25/24.  -Zofran PRN prescribed for at home with instructions to be taken by mouth every 8 hours as needed for nausea. If not working, Compazine prescribed as 2nd choice.    -OTC Imodium AD recommended for diarrhea (4-5 BMs a day). Take 2 tablets after the first loose bowel movement, and 1 tablet after each loose bowel movement after the first dose has been taken. No more than 4 tablets should be taken in any 24-hour period. If not working, Lomotil can be prescribed as 2nd choice.    -Mouth sore prevention with 1-quart warm water with 1 tsp of baking soda and salt and alcohol-free mouthwash.   -Emphasized adequate hand-hygiene and limited contact with people who are sick.   -Monitor and notify any bleeding in urine, stool, or sputum.  As well as unusual bleeding or bruising and stomach pain.   - Emphasized hydration with 4 16 oz bottles of water a day.    -Call clinic if fever >100.4, shakes or chills, shortness of breath, chest pain, uncontrolled vomiting or diarrhea, pain and tingling in the chest or arm, or just not feeling well.    -Plans to F/u in 2 weeks T/D visit with labs on 9/30/24.    DISCUSSION:    1.  A total of 60 minutes were spent in counseling today, in which 100% were face-to-face.   At today's therapy teaching session, we discussed the patient's cancer diagnosis as well as planned therapy regimen, protocol, side effects and toxicities.  A handout of each therapeutic agent in the regimen was provided and reviewed in detail.    2.  The following side effects were discussed but not limited to:    Carboplatin Side Effects:  Allergic Reactions  Breathing Problems  Bruising  Chest Pain  Chills  Cough  Fever  Hair Loss  High Blood Pressure  Infection  Itching  Low Blood Counts  Metallic Taste  Nausea  Nosebleeds  Numbness  Pain  Rash  Swelling  Tingling  Vomiting    Paclitaxel Side Effects:  Allergic Reactions  Breathing Problems  Bruising  Chills  Cough  Diarrhea  Feeling Faint  Fever  Hair Loss  Infection  Injury  Itching  Joint Pain  Low Blood Pressure  Mouth Sores  Nausea  Numbness  Pain  Rash  Swelling  Tingling  Vomiting                a.  Discussed the risk of infection while on therapy related to pancytopenia, specifically a decrease in their white blood cell count.  Instructed to contact our office for temperature >100.4 F, chills, sudden onset cough or shortness of breath, symptoms of a urinary tract infection.                b.  Discussed the risk of anemia. Instructed to contact our office for dizziness, heart palpitations, or extreme or sudden changes in weakness.                c.  Discussed the risk of thrombocytopenia, which increases the risk of bruising or bleeding.  Instructed the patient to contact our office for spontaneous signs of bleeding, including nose bleeds, bleeding from the gums or mouth, blood in sputum, urine or stool and unusual or excessive bruising or rash.                d.  Discussed GI side effects including weight changes, changes in appetite, altered sense of taste, stomatitis, nausea, vomiting, diarrhea, constipation, and heartburn.                e.  Discussed  side effects including painful urination, changes in the amount of urination, possible urine color  changes.  Discussed fertility issues and to prevent  pregnancy if of child bearing age.                f.  Discussed neurological side effects including the risk of peripheral neuropathy, either temporary or permanent.                g.  Discussed the potential for skin, hair, and nail changes.       3.  Instructed to contact our office for discussion of medication changes, the addition of vitamin and/or herbal supplementation as they may interact with some chemotherapy agents.    4.  Discussed dietary modifications and the need to maintain adequate caloric intake and proper oral hydration.  Recommended 64 ounces of fluid per day.    5.  Discussed anti-emetic protocol and bowel regimen protocol.    6.  Office contact information given including after hours number.  Discussed there is an oncologist on call 24/7, 365 days including weekends.  Provided primary nurse's information .    7.  In summary, the patient is in agreement with the plan of care.  Questions appeared to be answered to their satisfaction. Consented the patient to the treatment plan and the patient was educated on the planned duration of the treatment and schedule of the treatment administration. Copy to be scanned into the chart.    All questions answered to the satisfaction of the patient and family.     Follow up appointments given to patient.     ANA MARÍA HARTMAN  PATIENT EDUCATOR  [facility]

## 2024-09-25 ENCOUNTER — OFFICE VISIT (OUTPATIENT)
Dept: CARDIAC SURGERY | Facility: CLINIC | Age: 64
End: 2024-09-25
Payer: MEDICARE

## 2024-09-25 VITALS
WEIGHT: 145 LBS | SYSTOLIC BLOOD PRESSURE: 125 MMHG | HEIGHT: 62 IN | BODY MASS INDEX: 26.68 KG/M2 | HEART RATE: 67 BPM | DIASTOLIC BLOOD PRESSURE: 66 MMHG | OXYGEN SATURATION: 97 %

## 2024-09-25 DIAGNOSIS — C78.00: ICD-10-CM

## 2024-09-25 DIAGNOSIS — C34.11 MALIGNANT NEOPLASM OF UPPER LOBE OF RIGHT LUNG: ICD-10-CM

## 2024-09-25 PROCEDURE — 1126F AMNT PAIN NOTED NONE PRSNT: CPT | Mod: CPTII,,, | Performed by: THORACIC SURGERY (CARDIOTHORACIC VASCULAR SURGERY)

## 2024-09-25 PROCEDURE — 1160F RVW MEDS BY RX/DR IN RCRD: CPT | Mod: CPTII,,, | Performed by: THORACIC SURGERY (CARDIOTHORACIC VASCULAR SURGERY)

## 2024-09-25 PROCEDURE — 3074F SYST BP LT 130 MM HG: CPT | Mod: CPTII,,, | Performed by: THORACIC SURGERY (CARDIOTHORACIC VASCULAR SURGERY)

## 2024-09-25 PROCEDURE — 3078F DIAST BP <80 MM HG: CPT | Mod: CPTII,,, | Performed by: THORACIC SURGERY (CARDIOTHORACIC VASCULAR SURGERY)

## 2024-09-25 PROCEDURE — 3288F FALL RISK ASSESSMENT DOCD: CPT | Mod: CPTII,,, | Performed by: THORACIC SURGERY (CARDIOTHORACIC VASCULAR SURGERY)

## 2024-09-25 PROCEDURE — 99024 POSTOP FOLLOW-UP VISIT: CPT | Mod: ,,, | Performed by: THORACIC SURGERY (CARDIOTHORACIC VASCULAR SURGERY)

## 2024-09-25 PROCEDURE — 1101F PT FALLS ASSESS-DOCD LE1/YR: CPT | Mod: CPTII,,, | Performed by: THORACIC SURGERY (CARDIOTHORACIC VASCULAR SURGERY)

## 2024-09-25 PROCEDURE — 1159F MED LIST DOCD IN RCRD: CPT | Mod: CPTII,,, | Performed by: THORACIC SURGERY (CARDIOTHORACIC VASCULAR SURGERY)

## 2024-09-25 RX ORDER — TRAZODONE HYDROCHLORIDE 50 MG/1
TABLET ORAL
COMMUNITY
Start: 2024-09-09

## 2024-09-25 NOTE — PROGRESS NOTES
Subjective:       Patient ID: Valarie Rodriguez is a 64 y.o. female.    Pulmonologist: Dr. Zach Martinez    NSCLC Stage IIIA ([id number])--Diagnosed 24  Biopsy/pathology: Cervical mediastinoscopy done 24--multiple biopsies taken of 2R (upper paratracheal) LN and pathology showed poorly differentiated carcinoma, morphologically c/w NSCLC, c/w lung primary site, PD-L1 TPS 80%, CPS 85%, KRAS G12C mutation (approved therapies Adagrasib or Sotorasib), TMB 1.6 (low), MSI cannot be assessed.  Imagin. CT chest w/o contrast 23--7mm RUL spiculated nodule again seen and similar in appearance to comparison, suspicious for neoplasia, mediastinal lymph nodes similar in comparison, emphysema is noted.  2. CT chest w/o contrast done 2/15/24--stable RUL spiculated nodule, development of right hilar adenopathy, enlarged precarinal LN, increased in size compared to previous exam, PET/CT should be considered, emphysema.   3. CT chest w/o contrast 24--stable RUL spiculated nodule measures 7mm, stable small ground glass densities, enlarged precarinal LN measures 2.7X1.9cm, increased in size, right hilar LN vs. Mass measures 2.3X1.6cm, stable, PET should be considered, emphysema.   4. PET/CT done 24--hypermetabolic enlarged right paratracheal LN and prominent right hilum with slightly elevated activity c/w neoplasm, elevated activity of anterior thyroid bed and subcutaneous tissues, likely post-surgical, 7mm spiculated RUL nodule with adjacent vague opacity unchanged, and demonstrates no activity, bilateral L>R trochanteric bursitis, calcified uterine fibroid.   5. MRI brain w/ and w/o contrast done 24--no metastatic disease, mucous retention cyst vs, polyp in right maxillary antrum.    Current treatment plan:   Weekly Carboplatin/Paclitaxel + RT to start 24 (patient chose to delay treatment).  Durvalumab maintenance    Chief Complaint: Other Misc (Pt reports no concerns today.)    HPI  Patient  presents for follow-up of NSCLC. She did not get her mediport. She has a family trip planned at the end of October that has been planned for a long time and she would like to wait until after. Discussed that usually okay to wait for treatment and at times, the cancer can be slow growing, but I cannot guarantee that her cancer will not progress during this time. She understands that I would prefer for her to start treatment now, but she would like to wait to start the first of November. Her daughter is supportive with her decision. She has no symptoms.    Past Medical History:   Diagnosis Date    Hyperlipidemia     Lymphadenopathy     Pulmonary nodule     Thyroid disease       Past Surgical History:   Procedure Laterality Date    CHOLECYSTECTOMY      COLONOSCOPY      MEDIASTINOSCOPY N/A 8/29/2024    Procedure: MEDIASTINOSCOPY;  Surgeon: Melony Collier MD;  Location: [facility];  Service: Cardiothoracic;  Laterality: N/A;    THYROIDECTOMY, PARTIAL      TUBAL LIGATION       No family history on file.  Social History     Socioeconomic History    Marital status: Single    Years of education: 11   Tobacco Use    Smoking status: Every Day     Current packs/day: 0.50     Average packs/day: 0.5 packs/day for 35.0 years (17.5 ttl pk-yrs)     Types: Cigarettes   Substance and Sexual Activity    Alcohol use: No     Alcohol/week: 0.0 standard drinks of alcohol    Drug use: No    Sexual activity: Yes     Partners: Male       Review of patient's allergies indicates:  No Known Allergies   Current Outpatient Medications on File Prior to Visit   Medication Sig Dispense Refill    cholecalciferol, vitamin D3, 1,250 mcg (50,000 unit) capsule Take 50,000 Units by mouth every 7 days.      iron-vitamin C 100-250 mg, ICAR-C, 100-250 mg Tab Take 1 tablet by mouth every other day.      levothyroxine (SYNTHROID) 88 MCG tablet Take 88 mcg by mouth once daily.      buPROPion (WELLBUTRIN XL) 300 MG 24 hr tablet Take 300 mg by mouth every morning.  "(Patient not taking: Reported on 9/30/2024)      dicyclomine (BENTYL) 20 mg tablet  (Patient not taking: Reported on 9/30/2024)      gabapentin (NEURONTIN) 300 MG capsule Take 300 mg by mouth. (Patient not taking: Reported on 9/30/2024)      LORazepam (ATIVAN) 1 MG tablet Take 1 mg by mouth 2 (two) times daily. (Patient not taking: Reported on 9/30/2024)      traZODone (DESYREL) 50 MG tablet TAKE 1 TO 2 TABLETS BY MOUTH NIGHTLY AS NEEDED FOR SLEEP (Patient not taking: Reported on 9/30/2024)       No current facility-administered medications on file prior to visit.      Review of Systems   Constitutional:  Negative for appetite change and unexpected weight change.   HENT:  Negative for mouth sores.    Eyes:  Negative for visual disturbance.   Respiratory:  Negative for cough and shortness of breath.    Cardiovascular:  Negative for chest pain.   Gastrointestinal:  Negative for abdominal pain and diarrhea.   Genitourinary:  Negative for frequency.   Musculoskeletal:  Negative for back pain.   Integumentary:  Negative for rash.   Neurological:  Negative for headaches.   Hematological:  Negative for adenopathy.   Psychiatric/Behavioral:  The patient is not nervous/anxious.             Vitals:    09/30/24 0933   BP: 135/64   Pulse: 61   Resp: 14   Temp: 97.7 °F (36.5 °C)   TempSrc: Oral   SpO2: 99%   Weight: 65.5 kg (144 lb 6.4 oz)   Height: 5' 2" (1.575 m)        Physical Exam  Constitutional:       Appearance: Normal appearance.   HENT:      Head: Normocephalic.      Nose: Nose normal.      Mouth/Throat:      Mouth: Mucous membranes are moist.   Eyes:      Extraocular Movements: Extraocular movements intact.      Conjunctiva/sclera: Conjunctivae normal.   Cardiovascular:      Rate and Rhythm: Normal rate and regular rhythm.   Pulmonary:      Effort: Pulmonary effort is normal.      Breath sounds: Normal breath sounds.   Chest:      Comments: Anterior superior chest with incision from mediastinoscopy healed " well  Abdominal:      General: Bowel sounds are normal. There is no distension.      Palpations: Abdomen is soft.      Tenderness: There is no abdominal tenderness.   Musculoskeletal:         General: Normal range of motion.   Skin:     General: Skin is warm.   Neurological:      General: No focal deficit present.      Mental Status: She is alert and oriented to person, place, and time.   Psychiatric:         Mood and Affect: Mood normal.         Judgment: Judgment normal.       Lab Visit on 09/30/2024   Component Date Value Ref Range Status    WBC 09/30/2024 7.92  4.50 - 11.50 x10(3)/mcL Final    RBC 09/30/2024 4.31  4.20 - 5.40 x10(6)/mcL Final    Hgb 09/30/2024 13.8  12.0 - 16.0 g/dL Final    Hct 09/30/2024 42.4  37.0 - 47.0 % Final    MCV 09/30/2024 98.4 (H)  80.0 - 94.0 fL Final    MCH 09/30/2024 32.0 (H)  27.0 - 31.0 pg Final    MCHC 09/30/2024 32.5 (L)  33.0 - 36.0 g/dL Final    RDW 09/30/2024 13.5  11.5 - 17.0 % Final    Platelet 09/30/2024 333  130 - 400 x10(3)/mcL Final    MPV 09/30/2024 9.2  7.4 - 10.4 fL Final    Neut % 09/30/2024 60.8  % Final    Lymph % 09/30/2024 31.8  % Final    Mono % 09/30/2024 6.4  % Final    Eos % 09/30/2024 0.4  % Final    Basophil % 09/30/2024 0.5  % Final    Lymph # 09/30/2024 2.52  0.6 - 4.6 x10(3)/mcL Final    Neut # 09/30/2024 4.81  2.1 - 9.2 x10(3)/mcL Final    Mono # 09/30/2024 0.51  0.1 - 1.3 x10(3)/mcL Final    Eos # 09/30/2024 0.03  0 - 0.9 x10(3)/mcL Final    Baso # 09/30/2024 0.04  <=0.2 x10(3)/mcL Final    IG# 09/30/2024 0.01  0 - 0.04 x10(3)/mcL Final    IG% 09/30/2024 0.1  % Final   Lab Visit on 09/24/2024   Component Date Value Ref Range Status    Sodium 09/24/2024 141  136 - 145 mmol/L Final    Potassium 09/24/2024 4.5  3.5 - 5.1 mmol/L Final    Chloride 09/24/2024 105  98 - 107 mmol/L Final    CO2 09/24/2024 28  23 - 31 mmol/L Final    Glucose 09/24/2024 79 (L)  82 - 115 mg/dL Final    Blood Urea Nitrogen 09/24/2024 17.2  9.8 - 20.1 mg/dL Final    Creatinine  09/24/2024 0.86  0.55 - 1.02 mg/dL Final    Calcium 09/24/2024 9.8  8.4 - 10.2 mg/dL Final    Protein Total 09/24/2024 7.0  5.8 - 7.6 gm/dL Final    Albumin 09/24/2024 3.5  3.4 - 4.8 g/dL Final    Globulin 09/24/2024 3.5  2.4 - 3.5 gm/dL Final    Albumin/Globulin Ratio 09/24/2024 1.0 (L)  1.1 - 2.0 ratio Final    Bilirubin Total 09/24/2024 0.4  <=1.5 mg/dL Final    ALP 09/24/2024 67  40 - 150 unit/L Final    ALT 09/24/2024 7  0 - 55 unit/L Final    AST 09/24/2024 14  5 - 34 unit/L Final    eGFR 09/24/2024 >60  mL/min/1.73/m2 Final    Anion Gap 09/24/2024 8.0  mEq/L Final    BUN/Creatinine Ratio 09/24/2024 20   Final    Magnesium Level 09/24/2024 2.20  1.60 - 2.60 mg/dL Final    WBC 09/24/2024 7.35  4.50 - 11.50 x10(3)/mcL Final    RBC 09/24/2024 4.23  4.20 - 5.40 x10(6)/mcL Final    Hgb 09/24/2024 13.6  12.0 - 16.0 g/dL Final    Hct 09/24/2024 41.2  37.0 - 47.0 % Final    MCV 09/24/2024 97.4 (H)  80.0 - 94.0 fL Final    MCH 09/24/2024 32.2 (H)  27.0 - 31.0 pg Final    MCHC 09/24/2024 33.0  33.0 - 36.0 g/dL Final    RDW 09/24/2024 13.4  11.5 - 17.0 % Final    Platelet 09/24/2024 345  130 - 400 x10(3)/mcL Final    MPV 09/24/2024 9.3  7.4 - 10.4 fL Final    Neut % 09/24/2024 61.5  % Final    Lymph % 09/24/2024 29.5  % Final    Mono % 09/24/2024 7.9  % Final    Eos % 09/24/2024 0.5  % Final    Basophil % 09/24/2024 0.5  % Final    Lymph # 09/24/2024 2.17  0.6 - 4.6 x10(3)/mcL Final    Neut # 09/24/2024 4.51  2.1 - 9.2 x10(3)/mcL Final    Mono # 09/24/2024 0.58  0.1 - 1.3 x10(3)/mcL Final    Eos # 09/24/2024 0.04  0 - 0.9 x10(3)/mcL Final    Baso # 09/24/2024 0.04  <=0.2 x10(3)/mcL Final    IG# 09/24/2024 0.01  0 - 0.04 x10(3)/mcL Final    IG% 09/24/2024 0.1  % Final   Orders Only on 09/05/2024   Component Date Value Ref Range Status    Reason for Study 09/12/2024 To identify somatic and germline mutations relevant to patient's cancer.   Final    Genetic Diseases Assessed 09/12/2024 Cancer   Final    Description of  Ranges of DNA Seque* 09/12/2024 648 gene panel   Final    Overall Interpretation 09/12/2024 positive   Final    MSI 09/12/2024 Indeterminate   Final    TMB 09/12/2024 1.6  m/MB Final    [facility] Portal 09/12/2024 [email or web address]   Final    [facility] Portal link    PD-L1 (22C3) Combined Positive Sco* 09/12/2024 85   Final    PD-L1 (22C3) Tumor Proportion Score 09/12/2024 80  % Final    Fusion Addendum Issue Type 09/12/2024    Final                    Value:NEGATIVE  Negative - This addendum is being issued to report that no gene fusions or altered splicing variants from RNA sequencing analysis were found. This report is being issued to report the results of gene rearrangement and altered splicing analysis from RNA sequencing. No gene rearrangements nor reportable altered splicing events were identified from RNA sequencing.      Low Coverage Regions 09/12/2024 KDM5D   Final    Therapy Count 09/12/2024 2   Final    [facility]: Potential Therapy 1 09/12/2024    Final                    Value:Gene: 6407^KRAS^HGNC  Variant: p.G12C  Agent: Adagrasib  Tissue: Non-Small Cell Lung Cancer  Association: response  Evidence Status: Consensus  Evidence ID: NCCN  Evidence URL:   FDA Approved?: Yes  on label?: No      [facility]: Potential Therapy 2 09/12/2024    Final                    Value:Gene: 6407^KRAS^HGNC  Variant: p.G12C  Agent: Sotorasib  Tissue: Non-Small Cell Lung Cancer  Association: response  Evidence Status: Consensus  Evidence ID: NCCN  Evidence URL:   FDA Approved?: Yes  on label?: No      Trial Count 09/12/2024 3   Final    [facility]: Clinical Trial Match 1 09/12/2024    Final                    Value:Clinical Trial NCT ID: [id number]  Clinical Trial Title: Phase 1/2 Study of [id number] in Patients With Cancer Having a KRAS G12C Mutation DARCI-1  Clinical Trial URL:  https://clinicaltrials.gov/ct2/show/FBJ08963854  Clinical Phase: Phase 1/Phase 2  Matched criteria: KRAS p.G12C mutation      [facility]: Clinical Trial Match 2 09/12/2024    Final                    Value:Clinical Trial NCT ID: [id number]  Clinical Trial Title: Study of Safety, Pharmacokinetics, and Antitumor Activity of BGB-3245 in Participants With Advanced or Refractory Tumors  Clinical Trial URL: https://clinicaltrials.gov/ct2/show/UOQ96396536  Clinical Phase: Phase 1  Matched criteria: KRAS p.G12C mutation      [facility]: Clinical Trial Match 3 09/12/2024    Final                    Value:Clinical Trial NCT ID: [id number]  Clinical Trial Title: Study of [id number] in Cancer Patients With a Specific Genetic Mutation (KRAS G12C)  Clinical Trial URL: https://clinicaltrials.gov/ct2/show/IFB81521708  Clinical Phase: Phase 1/Phase 2  Matched criteria: KRAS p.G12C mutation           Assessment:       1. Malignant neoplasm of upper lobe of right lung        Plan:       Patient with Stage IIIA Lung cancer, NSCLC TTF-1+, poorly differentiated, not specified if adenocarcinoma or squamous cell carcinoma, although did confirm with pathology this is NSCLC. [facility] testing with PD-L1 85% and KRAS G12C mutation, diagnosed 8/29/24.   Primary tumor is either occult or may be the 7mm spiculated RUL mass. This was biopsied previously via bronchoscopy and reportedly negative. Will obtain records.  PET shows hypermetabolic right hilar LN and paratracheal LN, and the 7mm RUL lung nodule is not hypermetabolic, though too small to be evaluated by PET.  Brain MRI with no metastatic disease.   Case discussed with Dr. Kay.     Per NCCN guidelines, treatment recommended with definitive concurrent chemoradiation (Category 1) followed by Durvalumab maintenance.     Referred to Dr. Collier for mediport placement, and she will call him when she is ready.  Patient advised to begin treatment now, but prefers to wait until after her family trip  planned for end of October.  Will honor her wishes and move treatment to 11/5/24 after she returns.    Labs on 11/4/24 and treatment on 11/5/24 first dose of weekly Carboplatin/Paclitaxel.     F/u week after 11/11/24 labs and toxicity check prior to week #2.      All questions answered at this time.     Smoking cessation strongly encouraged.       Jeannie Clark MD    Potassium level is high. I don't believe the results.  Will have patient return to recheck.    Jeannie Clark MD    Treatment Plan Information   OP NSCLC PACLITAXEL + CARBOPLATIN (AUC) QW + RADIATION Jeannie Clark MD   Associated diagnosis: Malignant neoplasm of upper lobe of right lung Stage IIIA cT1a, cN2, cM0 noted on 9/13/2024   Line of treatment: First Line  Treatment Goal: Curative     Upcoming Treatment Dates - OP NSCLC PACLITAXEL + CARBOPLATIN (AUC) QW + RADIATION    11/5/2024       Pre-Medications       famotidine (PF) injection 20 mg       diphenhydrAMINE injection 25 mg       Chemotherapy       PACLitaxeL (TAXOL) 45 mg/m2 = 78 mg in 0.9% NaCl 250 mL chemo infusion       CARBOplatin (PARAPLATIN) in 0.9% NaCl 250 mL chemo infusion       Antiemetics       palonosetron 0.25mg/dexAMETHasone 12mg in NS IVPB 0.25 mg 50 mL  11/12/2024       Pre-Medications       famotidine (PF) injection 20 mg       diphenhydrAMINE injection 25 mg       Chemotherapy       PACLitaxeL (TAXOL) 45 mg/m2 = 78 mg in 0.9% NaCl 250 mL chemo infusion       CARBOplatin (PARAPLATIN) in 0.9% NaCl 250 mL chemo infusion       Antiemetics       palonosetron 0.25mg/dexAMETHasone 12mg in NS IVPB 0.25 mg 50 mL  11/19/2024       Pre-Medications       famotidine (PF) injection 20 mg       diphenhydrAMINE injection 25 mg       Chemotherapy       PACLitaxeL (TAXOL) 45 mg/m2 = 78 mg in 0.9% NaCl 250 mL chemo infusion       CARBOplatin (PARAPLATIN) in 0.9% NaCl 250 mL chemo infusion       Antiemetics       palonosetron 0.25mg/dexAMETHasone 12mg in NS IVPB 0.25 mg 50 mL  11/26/2024        Pre-Medications       famotidine (PF) injection 20 mg       diphenhydrAMINE injection 25 mg       Chemotherapy       PACLitaxeL (TAXOL) 45 mg/m2 = 78 mg in 0.9% NaCl 250 mL chemo infusion       CARBOplatin (PARAPLATIN) in 0.9% NaCl 250 mL chemo infusion       Antiemetics       palonosetron 0.25mg/dexAMETHasone 12mg in NS IVPB 0.25 mg 50 mL

## 2024-09-25 NOTE — PROGRESS NOTES
"Valarie Rodriguez is a 64 y.o. female patient.   1. Malignant neoplasm of upper lobe of right lung    2. Cancer, metastatic to lung      Past Medical History:   Diagnosis Date    Hyperlipidemia     Lymphadenopathy     Pulmonary nodule     Thyroid disease      No past surgical history pertinent negatives on file.  Scheduled Meds:  Continuous Infusions:  PRN Meds:    Review of patient's allergies indicates:  No Known Allergies  There are no hospital problems to display for this patient.    Blood pressure 125/66, pulse 67, height 5' 2" (1.575 m), weight 65.8 kg (145 lb), SpO2 97%.    Subjective:  The patient returns to clinic status post mediastinoscopy.  She has been doing well.  She has followed up with Oncology with Dr. Clark.      Objective:  Her wounds have healed well.      Assessment & Plan:  The patient is still debating if she want to go with the chemotherapy and radiotherapy.  She will come back to clinic if she needs a port.      Melony Collier MD  9/25/2024    "

## 2024-09-26 PROCEDURE — 77300 RADIATION THERAPY DOSE PLAN: CPT | Performed by: RADIOLOGY

## 2024-09-26 PROCEDURE — 77285 THER RAD SIMULAJ FIELD INTRM: CPT | Performed by: RADIOLOGY

## 2024-09-26 PROCEDURE — 77301 RADIOTHERAPY DOSE PLAN IMRT: CPT | Performed by: RADIOLOGY

## 2024-09-26 PROCEDURE — 77338 DESIGN MLC DEVICE FOR IMRT: CPT | Performed by: RADIOLOGY

## 2024-09-30 ENCOUNTER — LAB VISIT (OUTPATIENT)
Dept: LAB | Facility: HOSPITAL | Age: 64
End: 2024-09-30
Attending: INTERNAL MEDICINE
Payer: MEDICARE

## 2024-09-30 ENCOUNTER — OFFICE VISIT (OUTPATIENT)
Dept: HEMATOLOGY/ONCOLOGY | Facility: CLINIC | Age: 64
End: 2024-09-30
Payer: MEDICARE

## 2024-09-30 VITALS
HEART RATE: 61 BPM | OXYGEN SATURATION: 99 % | HEIGHT: 62 IN | WEIGHT: 144.38 LBS | BODY MASS INDEX: 26.57 KG/M2 | TEMPERATURE: 98 F | RESPIRATION RATE: 14 BRPM | SYSTOLIC BLOOD PRESSURE: 135 MMHG | DIASTOLIC BLOOD PRESSURE: 64 MMHG

## 2024-09-30 DIAGNOSIS — C78.00: ICD-10-CM

## 2024-09-30 DIAGNOSIS — C34.11 MALIGNANT NEOPLASM OF UPPER LOBE OF RIGHT LUNG: ICD-10-CM

## 2024-09-30 DIAGNOSIS — C34.11 MALIGNANT NEOPLASM OF UPPER LOBE OF RIGHT LUNG: Primary | ICD-10-CM

## 2024-09-30 LAB
ALBUMIN SERPL-MCNC: 3.8 G/DL (ref 3.4–4.8)
ALBUMIN/GLOB SERPL: 1.1 RATIO (ref 1.1–2)
ALP SERPL-CCNC: 71 UNIT/L (ref 40–150)
ALT SERPL-CCNC: 8 UNIT/L (ref 0–55)
ANION GAP SERPL CALC-SCNC: 6 MEQ/L
AST SERPL-CCNC: 13 UNIT/L (ref 5–34)
BASOPHILS # BLD AUTO: 0.04 X10(3)/MCL
BASOPHILS NFR BLD AUTO: 0.5 %
BILIRUB SERPL-MCNC: 0.4 MG/DL
BUN SERPL-MCNC: 13.5 MG/DL (ref 9.8–20.1)
CALCIUM SERPL-MCNC: 10 MG/DL (ref 8.4–10.2)
CHLORIDE SERPL-SCNC: 110 MMOL/L (ref 98–107)
CO2 SERPL-SCNC: 29 MMOL/L (ref 23–31)
CREAT SERPL-MCNC: 0.94 MG/DL (ref 0.55–1.02)
CREAT/UREA NIT SERPL: 14
EOSINOPHIL # BLD AUTO: 0.03 X10(3)/MCL (ref 0–0.9)
EOSINOPHIL NFR BLD AUTO: 0.4 %
ERYTHROCYTE [DISTWIDTH] IN BLOOD BY AUTOMATED COUNT: 13.5 % (ref 11.5–17)
GFR SERPLBLD CREATININE-BSD FMLA CKD-EPI: >60 ML/MIN/1.73/M2
GLOBULIN SER-MCNC: 3.4 GM/DL (ref 2.4–3.5)
GLUCOSE SERPL-MCNC: 82 MG/DL (ref 82–115)
HCT VFR BLD AUTO: 42.4 % (ref 37–47)
HGB BLD-MCNC: 13.8 G/DL (ref 12–16)
IMM GRANULOCYTES # BLD AUTO: 0.01 X10(3)/MCL (ref 0–0.04)
IMM GRANULOCYTES NFR BLD AUTO: 0.1 %
LYMPHOCYTES # BLD AUTO: 2.52 X10(3)/MCL (ref 0.6–4.6)
LYMPHOCYTES NFR BLD AUTO: 31.8 %
MCH RBC QN AUTO: 32 PG (ref 27–31)
MCHC RBC AUTO-ENTMCNC: 32.5 G/DL (ref 33–36)
MCV RBC AUTO: 98.4 FL (ref 80–94)
MONOCYTES # BLD AUTO: 0.51 X10(3)/MCL (ref 0.1–1.3)
MONOCYTES NFR BLD AUTO: 6.4 %
NEUTROPHILS # BLD AUTO: 4.81 X10(3)/MCL (ref 2.1–9.2)
NEUTROPHILS NFR BLD AUTO: 60.8 %
PLATELET # BLD AUTO: 333 X10(3)/MCL (ref 130–400)
PMV BLD AUTO: 9.2 FL (ref 7.4–10.4)
POTASSIUM SERPL-SCNC: 6.1 MMOL/L (ref 3.5–5.1)
PROT SERPL-MCNC: 7.2 GM/DL (ref 5.8–7.6)
RBC # BLD AUTO: 4.31 X10(6)/MCL (ref 4.2–5.4)
SODIUM SERPL-SCNC: 145 MMOL/L (ref 136–145)
WBC # BLD AUTO: 7.92 X10(3)/MCL (ref 4.5–11.5)

## 2024-09-30 PROCEDURE — 99215 OFFICE O/P EST HI 40 MIN: CPT | Mod: S$GLB,,, | Performed by: INTERNAL MEDICINE

## 2024-09-30 PROCEDURE — 1101F PT FALLS ASSESS-DOCD LE1/YR: CPT | Mod: CPTII,S$GLB,, | Performed by: INTERNAL MEDICINE

## 2024-09-30 PROCEDURE — 3008F BODY MASS INDEX DOCD: CPT | Mod: CPTII,S$GLB,, | Performed by: INTERNAL MEDICINE

## 2024-09-30 PROCEDURE — 3288F FALL RISK ASSESSMENT DOCD: CPT | Mod: CPTII,S$GLB,, | Performed by: INTERNAL MEDICINE

## 2024-09-30 PROCEDURE — 80053 COMPREHEN METABOLIC PANEL: CPT

## 2024-09-30 PROCEDURE — 36415 COLL VENOUS BLD VENIPUNCTURE: CPT

## 2024-09-30 PROCEDURE — 1126F AMNT PAIN NOTED NONE PRSNT: CPT | Mod: CPTII,S$GLB,, | Performed by: INTERNAL MEDICINE

## 2024-09-30 PROCEDURE — 85025 COMPLETE CBC W/AUTO DIFF WBC: CPT

## 2024-09-30 PROCEDURE — 3075F SYST BP GE 130 - 139MM HG: CPT | Mod: CPTII,S$GLB,, | Performed by: INTERNAL MEDICINE

## 2024-09-30 PROCEDURE — 99999 PR PBB SHADOW E&M-EST. PATIENT-LVL IV: CPT | Mod: PBBFAC,,, | Performed by: INTERNAL MEDICINE

## 2024-09-30 PROCEDURE — 1159F MED LIST DOCD IN RCRD: CPT | Mod: CPTII,S$GLB,, | Performed by: INTERNAL MEDICINE

## 2024-09-30 PROCEDURE — 1160F RVW MEDS BY RX/DR IN RCRD: CPT | Mod: CPTII,S$GLB,, | Performed by: INTERNAL MEDICINE

## 2024-09-30 PROCEDURE — 3078F DIAST BP <80 MM HG: CPT | Mod: CPTII,S$GLB,, | Performed by: INTERNAL MEDICINE

## 2024-10-01 ENCOUNTER — LAB VISIT (OUTPATIENT)
Dept: LAB | Facility: HOSPITAL | Age: 64
End: 2024-10-01
Attending: INTERNAL MEDICINE
Payer: MEDICARE

## 2024-10-01 DIAGNOSIS — C34.11 MALIGNANT NEOPLASM OF UPPER LOBE OF RIGHT LUNG: Primary | ICD-10-CM

## 2024-10-01 DIAGNOSIS — C34.11 MALIGNANT NEOPLASM OF UPPER LOBE OF RIGHT LUNG: ICD-10-CM

## 2024-10-01 LAB
ALBUMIN SERPL-MCNC: 3.5 G/DL (ref 3.4–4.8)
ALBUMIN/GLOB SERPL: 1 RATIO (ref 1.1–2)
ALP SERPL-CCNC: 65 UNIT/L (ref 40–150)
ALT SERPL-CCNC: 7 UNIT/L (ref 0–55)
ANION GAP SERPL CALC-SCNC: 6 MEQ/L
AST SERPL-CCNC: 16 UNIT/L (ref 5–34)
BASOPHILS # BLD AUTO: 0.03 X10(3)/MCL
BASOPHILS NFR BLD AUTO: 0.4 %
BILIRUB SERPL-MCNC: 0.4 MG/DL
BUN SERPL-MCNC: 14.3 MG/DL (ref 9.8–20.1)
CALCIUM SERPL-MCNC: 9.8 MG/DL (ref 8.4–10.2)
CHLORIDE SERPL-SCNC: 107 MMOL/L (ref 98–107)
CO2 SERPL-SCNC: 29 MMOL/L (ref 23–31)
CREAT SERPL-MCNC: 0.87 MG/DL (ref 0.55–1.02)
CREAT/UREA NIT SERPL: 16
EOSINOPHIL # BLD AUTO: 0.02 X10(3)/MCL (ref 0–0.9)
EOSINOPHIL NFR BLD AUTO: 0.3 %
ERYTHROCYTE [DISTWIDTH] IN BLOOD BY AUTOMATED COUNT: 13.8 % (ref 11.5–17)
GFR SERPLBLD CREATININE-BSD FMLA CKD-EPI: >60 ML/MIN/1.73/M2
GLOBULIN SER-MCNC: 3.6 GM/DL (ref 2.4–3.5)
GLUCOSE SERPL-MCNC: 75 MG/DL (ref 82–115)
HCT VFR BLD AUTO: 41.8 % (ref 37–47)
HGB BLD-MCNC: 13.6 G/DL (ref 12–16)
IMM GRANULOCYTES # BLD AUTO: 0 X10(3)/MCL (ref 0–0.04)
IMM GRANULOCYTES NFR BLD AUTO: 0 %
LYMPHOCYTES # BLD AUTO: 1.98 X10(3)/MCL (ref 0.6–4.6)
LYMPHOCYTES NFR BLD AUTO: 28 %
MAGNESIUM SERPL-MCNC: 2.1 MG/DL (ref 1.6–2.6)
MCH RBC QN AUTO: 32.1 PG (ref 27–31)
MCHC RBC AUTO-ENTMCNC: 32.5 G/DL (ref 33–36)
MCV RBC AUTO: 98.6 FL (ref 80–94)
MONOCYTES # BLD AUTO: 0.51 X10(3)/MCL (ref 0.1–1.3)
MONOCYTES NFR BLD AUTO: 7.2 %
NEUTROPHILS # BLD AUTO: 4.54 X10(3)/MCL (ref 2.1–9.2)
NEUTROPHILS NFR BLD AUTO: 64.1 %
PLATELET # BLD AUTO: 244 X10(3)/MCL (ref 130–400)
PMV BLD AUTO: 11.3 FL (ref 7.4–10.4)
POTASSIUM SERPL-SCNC: 4.5 MMOL/L (ref 3.5–5.1)
PROT SERPL-MCNC: 7.1 GM/DL (ref 5.8–7.6)
RBC # BLD AUTO: 4.24 X10(6)/MCL (ref 4.2–5.4)
SODIUM SERPL-SCNC: 142 MMOL/L (ref 136–145)
WBC # BLD AUTO: 7.08 X10(3)/MCL (ref 4.5–11.5)

## 2024-10-01 PROCEDURE — 85025 COMPLETE CBC W/AUTO DIFF WBC: CPT

## 2024-10-01 PROCEDURE — 80053 COMPREHEN METABOLIC PANEL: CPT

## 2024-10-01 PROCEDURE — 36415 COLL VENOUS BLD VENIPUNCTURE: CPT

## 2024-10-01 PROCEDURE — 83735 ASSAY OF MAGNESIUM: CPT

## 2024-10-08 ENCOUNTER — TELEPHONE (OUTPATIENT)
Dept: HEMATOLOGY/ONCOLOGY | Facility: CLINIC | Age: 64
End: 2024-10-08
Payer: MEDICARE

## 2024-10-08 NOTE — TELEPHONE ENCOUNTER
I spoke with Saritha regarding smoking cessation. She stated she could not talk at that moment and would call me back today.

## 2024-10-15 ENCOUNTER — PATIENT MESSAGE (OUTPATIENT)
Dept: RESEARCH | Facility: HOSPITAL | Age: 64
End: 2024-10-15
Payer: MEDICARE

## 2024-10-22 ENCOUNTER — PATIENT MESSAGE (OUTPATIENT)
Dept: RESEARCH | Facility: HOSPITAL | Age: 64
End: 2024-10-22
Payer: MEDICARE

## 2024-11-12 ENCOUNTER — TELEPHONE (OUTPATIENT)
Dept: HEMATOLOGY/ONCOLOGY | Facility: CLINIC | Age: 64
End: 2024-11-12
Payer: MEDICARE

## 2024-11-12 ENCOUNTER — DOCUMENTATION ONLY (OUTPATIENT)
Dept: HEMATOLOGY/ONCOLOGY | Facility: CLINIC | Age: 64
End: 2024-11-12
Payer: MEDICARE

## 2024-11-12 NOTE — TELEPHONE ENCOUNTER
Called patient to schedule an appointment with the Oncologist.     Appointment scheduled for: 11/20/24

## 2024-11-20 ENCOUNTER — OFFICE VISIT (OUTPATIENT)
Dept: HEMATOLOGY/ONCOLOGY | Facility: CLINIC | Age: 64
End: 2024-11-20
Payer: MEDICARE

## 2024-11-20 VITALS
BODY MASS INDEX: 26.57 KG/M2 | HEART RATE: 64 BPM | TEMPERATURE: 97 F | WEIGHT: 144.38 LBS | DIASTOLIC BLOOD PRESSURE: 69 MMHG | SYSTOLIC BLOOD PRESSURE: 137 MMHG | HEIGHT: 62 IN | OXYGEN SATURATION: 98 %
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
## 2024-11-20 DIAGNOSIS — T45.1X5A IMMUNODEFICIENCY DUE TO CHEMOTHERAPY: ICD-10-CM

## 2024-11-20 DIAGNOSIS — D84.822 IMMUNODEFICIENCY SECONDARY TO RADIATION THERAPY: ICD-10-CM

## 2024-11-20 DIAGNOSIS — C78.1 SECONDARY MALIGNANT NEOPLASM OF MEDIASTINUM: ICD-10-CM

## 2024-11-20 DIAGNOSIS — Z79.899 IMMUNODEFICIENCY DUE TO CHEMOTHERAPY: ICD-10-CM

## 2024-11-20 DIAGNOSIS — D84.821 IMMUNODEFICIENCY DUE TO CHEMOTHERAPY: ICD-10-CM

## 2024-11-20 DIAGNOSIS — C34.11 MALIGNANT NEOPLASM OF UPPER LOBE OF RIGHT LUNG: Primary | ICD-10-CM

## 2024-11-20 PROBLEM — Z79.69 IMMUNODEFICIENCY DUE TO CHEMOTHERAPY: Status: ACTIVE | Noted: 2024-11-20

## 2024-11-20 PROCEDURE — 99999 PR PBB SHADOW E&M-EST. PATIENT-LVL III: CPT | Mod: PBBFAC,,, | Performed by: INTERNAL MEDICINE

## 2024-11-20 NOTE — PROGRESS NOTES
Subjective:       Patient ID: Valarie Rodriguez is a 64 y.o. female.    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  Patient had biopsy-proven of mediastinal node.  Last imaging performed in 2024.  ECOG status 1 accompanied by her daughter    Past Medical History:   Diagnosis Date    Hyperlipidemia     Lymphadenopathy     Malignant neoplasm of upper lobe of right lung 2024    Pulmonary nodule     Thyroid disease      No family history on file.  Social History     Socioeconomic History    Marital status: Single    Years of education: 11   Tobacco Use    Smoking status: Former     Current packs/day: 0.00     Average packs/day: 0.5 packs/day for 35.0 years (17.5 ttl pk-yrs)     Types: Cigarettes     Quit date: 2024     Years since quittin.0   Substance and Sexual Activity    Alcohol use: No     Alcohol/week: 0.0 standard drinks of alcohol    Drug use: No    Sexual activity: Yes     Partners: Male     Social Drivers of Health     Financial Resource Strain: Low Risk  (2024)    Overall Financial Resource Strain (CARDIA)     Difficulty of Paying Living Expenses: Not hard at all   Food Insecurity: No Food Insecurity (2024)    Hunger Vital Sign     Worried About Running Out of Food in the Last Year: Never true     Ran Out of Food in the Last Year: Never true   Physical Activity: Inactive (2024)    Exercise Vital Sign     Days of Exercise per Week: 0 days     Minutes of Exercise per Session: 0 min   Stress: Stress Concern Present (2024)    [employer] [place] [employer] - Occupational Stress Questionnaire     Feeling of Stress : To some extent   Housing Stability: Unknown (2024)    Housing Stability Vital Sign     Unable to Pay for Housing in the Last Year: No     Past Surgical History:   Procedure Laterality Date    CHOLECYSTECTOMY       "COLONOSCOPY      MEDIASTINOSCOPY N/A 8/29/2024    Procedure: MEDIASTINOSCOPY;  Surgeon: Melony Collier MD;  Location: [facility];  Service: Cardiothoracic;  Laterality: N/A;    THYROIDECTOMY, PARTIAL      TUBAL LIGATION         Labs:  Lab Results   Component Value Date    WBC 7.08 10/01/2024    HGB 13.6 10/01/2024    HCT 41.8 10/01/2024    MCV 98.6 (H) 10/01/2024     10/01/2024     BMP  Lab Results   Component Value Date     10/01/2024    K 4.5 10/01/2024     10/01/2024    CO2 29 10/01/2024    BUN 14.3 10/01/2024    CREATININE 0.87 10/01/2024    CALCIUM 9.8 10/01/2024     Lab Results   Component Value Date    ALT 7 10/01/2024    AST 16 10/01/2024    ALKPHOS 65 10/01/2024    BILITOT 0.4 10/01/2024       No results found for: "IRON", "TIBC", "FERRITIN", "SATURATEDIRO"  No results found for: "[id number]"  No results found for: "FOLATE"  No results found for: "TSH"      Review of Systems   Constitutional:  Negative for activity change, appetite change, chills, diaphoresis, fatigue, fever and unexpected weight change.   HENT:  Negative for congestion, dental problem, drooling, ear discharge, ear pain, facial swelling, hearing loss, mouth sores, nosebleeds, postnasal drip, rhinorrhea, sinus pressure, sneezing, sore throat, tinnitus, trouble swallowing and voice change.    Eyes:  Negative for photophobia, pain, discharge, redness, itching and visual disturbance.   Respiratory:  Negative for cough, choking, chest tightness, shortness of breath, wheezing and stridor.    Cardiovascular:  Negative for chest pain, palpitations and leg swelling.   Gastrointestinal:  Negative for abdominal distention, abdominal pain, anal bleeding, blood in stool, constipation, diarrhea, nausea, rectal pain and vomiting.   Endocrine: Negative for cold intolerance, heat intolerance, polydipsia, polyphagia and polyuria.   Genitourinary:  Negative for decreased urine volume, difficulty urinating, dyspareunia, dysuria, enuresis, " flank pain, frequency, genital sores, hematuria, menstrual problem, pelvic pain, urgency, vaginal bleeding, vaginal discharge and vaginal pain.   Musculoskeletal:  Negative for arthralgias, back pain, gait problem, joint swelling, myalgias, neck pain and neck stiffness.   Skin:  Negative for color change, pallor and rash.   Allergic/Immunologic: Negative for environmental allergies, food allergies and immunocompromised state.   Neurological:  Negative for dizziness, tremors, seizures, syncope, facial asymmetry, speech difficulty, weakness, light-headedness, numbness and headaches.   Hematological:  Negative for adenopathy. Does not bruise/bleed easily.   Psychiatric/Behavioral:  Positive for dysphoric mood. Negative for agitation, behavioral problems, confusion, decreased concentration, hallucinations, self-injury, sleep disturbance and suicidal ideas. The patient is nervous/anxious. The patient is not hyperactive.        Objective:      Physical Exam  Vitals reviewed.   Constitutional:       General: She is not in acute distress.     Appearance: She is well-developed. She is not diaphoretic.   HENT:      Head: Normocephalic and atraumatic.      Right Ear: External ear normal.      Left Ear: External ear normal.      Nose: Nose normal.      Right Sinus: No maxillary sinus tenderness or frontal sinus tenderness.      Left Sinus: No maxillary sinus tenderness or frontal sinus tenderness.      Mouth/Throat:      Pharynx: No oropharyngeal exudate.   Eyes:      General: Lids are normal. No scleral icterus.        Right eye: No discharge.         Left eye: No discharge.      Conjunctiva/sclera: Conjunctivae normal.      Right eye: Right conjunctiva is not injected. No hemorrhage.     Left eye: Left conjunctiva is not injected. No hemorrhage.     Pupils: Pupils are equal, round, and reactive to light.   Neck:      Thyroid: No thyromegaly.      Vascular: No JVD.      Trachea: No tracheal deviation.   Cardiovascular:       Rate and Rhythm: Normal rate.   Pulmonary:      Effort: Pulmonary effort is normal. No respiratory distress.      Breath sounds: No stridor.   Chest:      Chest wall: No tenderness.   Abdominal:      General: Bowel sounds are normal. There is no distension.      Palpations: Abdomen is soft. There is no hepatomegaly, splenomegaly or mass.      Tenderness: There is no abdominal tenderness. There is no rebound.   Musculoskeletal:         General: No tenderness. Normal range of motion.      Cervical back: Normal range of motion and neck supple.   Lymphadenopathy:      Cervical: No cervical adenopathy.      Upper Body:      Right upper body: No supraclavicular adenopathy.      Left upper body: No supraclavicular adenopathy.   Skin:     General: Skin is dry.      Findings: No erythema or rash.   Neurological:      Mental Status: She is alert and oriented to person, place, and time.      Cranial Nerves: No cranial nerve deficit.      Coordination: Coordination normal.   Psychiatric:         Behavior: Behavior normal.         Thought Content: Thought content normal.         Judgment: Judgment normal.             Assessment:      1. 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 Secondary malignant neoplasm of mediastinum           Med Onc Chart Routing      Follow up with physician . Return to primary oncologist Jeannie Broussard for treatment   Follow up with RAMON    Infusion scheduling note    Injection scheduling note    Labs    Imaging    Pharmacy appointment    Other referrals                   Plan:     Extensive conversation with she and her daughter reviewed information with her information from up-to-date followed to her as well on stage III non-small cell lung cancer I agree entirely with treatment carboplatin and Taxol and maintenance immunotherapy.  40% 5 year survival.  At this time she is brought many of images  we will loaded into our system they have if needed.  I did tell the patient it has been almost 2 months since her staging begin in her primary oncologist may wish to repeat imaging such as CT chest abdomen pelvis before starting to make sure there is no evidence of metastatic spread discussed implications of answered questions with her; encouraged for tobacco cessation.  Referral made to tobacco cessation program        Ga Hernandez Jr, MD FACP

## 2024-11-20 NOTE — H&P (VIEW-ONLY)
Subjective:       Patient ID: Valarie Rodriguez is a 64 y.o. female.    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  Patient had biopsy-proven of mediastinal node.  Last imaging performed in 2024.  ECOG status 1 accompanied by her daughter    Past Medical History:   Diagnosis Date    Hyperlipidemia     Lymphadenopathy     Malignant neoplasm of upper lobe of right lung 2024    Pulmonary nodule     Thyroid disease      No family history on file.  Social History     Socioeconomic History    Marital status: Single    Years of education: 11   Tobacco Use    Smoking status: Former     Current packs/day: 0.00     Average packs/day: 0.5 packs/day for 35.0 years (17.5 ttl pk-yrs)     Types: Cigarettes     Quit date: 2024     Years since quittin.0   Substance and Sexual Activity    Alcohol use: No     Alcohol/week: 0.0 standard drinks of alcohol    Drug use: No    Sexual activity: Yes     Partners: Male     Social Drivers of Health     Financial Resource Strain: Low Risk  (2024)    Overall Financial Resource Strain (CARDIA)     Difficulty of Paying Living Expenses: Not hard at all   Food Insecurity: No Food Insecurity (2024)    Hunger Vital Sign     Worried About Running Out of Food in the Last Year: Never true     Ran Out of Food in the Last Year: Never true   Physical Activity: Inactive (2024)    Exercise Vital Sign     Days of Exercise per Week: 0 days     Minutes of Exercise per Session: 0 min   Stress: Stress Concern Present (2024)    [employer] [place] [employer] - Occupational Stress Questionnaire     Feeling of Stress : To some extent   Housing Stability: Unknown (2024)    Housing Stability Vital Sign     Unable to Pay for Housing in the Last Year: No     Past Surgical History:   Procedure Laterality Date    CHOLECYSTECTOMY       "COLONOSCOPY      MEDIASTINOSCOPY N/A 8/29/2024    Procedure: MEDIASTINOSCOPY;  Surgeon: Melony Collier MD;  Location: [facility];  Service: Cardiothoracic;  Laterality: N/A;    THYROIDECTOMY, PARTIAL      TUBAL LIGATION         Labs:  Lab Results   Component Value Date    WBC 7.08 10/01/2024    HGB 13.6 10/01/2024    HCT 41.8 10/01/2024    MCV 98.6 (H) 10/01/2024     10/01/2024     BMP  Lab Results   Component Value Date     10/01/2024    K 4.5 10/01/2024     10/01/2024    CO2 29 10/01/2024    BUN 14.3 10/01/2024    CREATININE 0.87 10/01/2024    CALCIUM 9.8 10/01/2024     Lab Results   Component Value Date    ALT 7 10/01/2024    AST 16 10/01/2024    ALKPHOS 65 10/01/2024    BILITOT 0.4 10/01/2024       No results found for: "IRON", "TIBC", "FERRITIN", "SATURATEDIRO"  No results found for: "[id number]"  No results found for: "FOLATE"  No results found for: "TSH"      Review of Systems   Constitutional:  Negative for activity change, appetite change, chills, diaphoresis, fatigue, fever and unexpected weight change.   HENT:  Negative for congestion, dental problem, drooling, ear discharge, ear pain, facial swelling, hearing loss, mouth sores, nosebleeds, postnasal drip, rhinorrhea, sinus pressure, sneezing, sore throat, tinnitus, trouble swallowing and voice change.    Eyes:  Negative for photophobia, pain, discharge, redness, itching and visual disturbance.   Respiratory:  Negative for cough, choking, chest tightness, shortness of breath, wheezing and stridor.    Cardiovascular:  Negative for chest pain, palpitations and leg swelling.   Gastrointestinal:  Negative for abdominal distention, abdominal pain, anal bleeding, blood in stool, constipation, diarrhea, nausea, rectal pain and vomiting.   Endocrine: Negative for cold intolerance, heat intolerance, polydipsia, polyphagia and polyuria.   Genitourinary:  Negative for decreased urine volume, difficulty urinating, dyspareunia, dysuria, enuresis, " flank pain, frequency, genital sores, hematuria, menstrual problem, pelvic pain, urgency, vaginal bleeding, vaginal discharge and vaginal pain.   Musculoskeletal:  Negative for arthralgias, back pain, gait problem, joint swelling, myalgias, neck pain and neck stiffness.   Skin:  Negative for color change, pallor and rash.   Allergic/Immunologic: Negative for environmental allergies, food allergies and immunocompromised state.   Neurological:  Negative for dizziness, tremors, seizures, syncope, facial asymmetry, speech difficulty, weakness, light-headedness, numbness and headaches.   Hematological:  Negative for adenopathy. Does not bruise/bleed easily.   Psychiatric/Behavioral:  Positive for dysphoric mood. Negative for agitation, behavioral problems, confusion, decreased concentration, hallucinations, self-injury, sleep disturbance and suicidal ideas. The patient is nervous/anxious. The patient is not hyperactive.        Objective:      Physical Exam  Vitals reviewed.   Constitutional:       General: She is not in acute distress.     Appearance: She is well-developed. She is not diaphoretic.   HENT:      Head: Normocephalic and atraumatic.      Right Ear: External ear normal.      Left Ear: External ear normal.      Nose: Nose normal.      Right Sinus: No maxillary sinus tenderness or frontal sinus tenderness.      Left Sinus: No maxillary sinus tenderness or frontal sinus tenderness.      Mouth/Throat:      Pharynx: No oropharyngeal exudate.   Eyes:      General: Lids are normal. No scleral icterus.        Right eye: No discharge.         Left eye: No discharge.      Conjunctiva/sclera: Conjunctivae normal.      Right eye: Right conjunctiva is not injected. No hemorrhage.     Left eye: Left conjunctiva is not injected. No hemorrhage.     Pupils: Pupils are equal, round, and reactive to light.   Neck:      Thyroid: No thyromegaly.      Vascular: No JVD.      Trachea: No tracheal deviation.   Cardiovascular:       Rate and Rhythm: Normal rate.   Pulmonary:      Effort: Pulmonary effort is normal. No respiratory distress.      Breath sounds: No stridor.   Chest:      Chest wall: No tenderness.   Abdominal:      General: Bowel sounds are normal. There is no distension.      Palpations: Abdomen is soft. There is no hepatomegaly, splenomegaly or mass.      Tenderness: There is no abdominal tenderness. There is no rebound.   Musculoskeletal:         General: No tenderness. Normal range of motion.      Cervical back: Normal range of motion and neck supple.   Lymphadenopathy:      Cervical: No cervical adenopathy.      Upper Body:      Right upper body: No supraclavicular adenopathy.      Left upper body: No supraclavicular adenopathy.   Skin:     General: Skin is dry.      Findings: No erythema or rash.   Neurological:      Mental Status: She is alert and oriented to person, place, and time.      Cranial Nerves: No cranial nerve deficit.      Coordination: Coordination normal.   Psychiatric:         Behavior: Behavior normal.         Thought Content: Thought content normal.         Judgment: Judgment normal.             Assessment:      1. 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 Secondary malignant neoplasm of mediastinum           Med Onc Chart Routing      Follow up with physician . Return to primary oncologist Jeannie Broussard for treatment   Follow up with RAMON    Infusion scheduling note    Injection scheduling note    Labs    Imaging    Pharmacy appointment    Other referrals                   Plan:     Extensive conversation with she and her daughter reviewed information with her information from up-to-date followed to her as well on stage III non-small cell lung cancer I agree entirely with treatment carboplatin and Taxol and maintenance immunotherapy.  40% 5 year survival.  At this time she is brought many of images  we will loaded into our system they have if needed.  I did tell the patient it has been almost 2 months since her staging begin in her primary oncologist may wish to repeat imaging such as CT chest abdomen pelvis before starting to make sure there is no evidence of metastatic spread discussed implications of answered questions with her; encouraged for tobacco cessation.  Referral made to tobacco cessation program        Ga Hernandez Jr, MD FACP

## 2024-11-21 ENCOUNTER — TELEPHONE (OUTPATIENT)
Dept: CARDIAC SURGERY | Facility: CLINIC | Age: 64
End: 2024-11-21
Payer: MEDICARE

## 2024-11-21 DIAGNOSIS — C34.11 MALIGNANT NEOPLASM OF UPPER LOBE OF RIGHT LUNG: Primary | ICD-10-CM

## 2024-11-21 NOTE — TELEPHONE ENCOUNTER
No referral or message from Dr. Clark office about plan for chemotherapy.  Pt states they are going to order another PET.  Inst that we can schedule her as soon as we get referral or information.  Verb understanding.   ----- Message from Dyan sent at 11/21/2024  3:56 PM CST -----  Patient called and wants to schedule a mediport please reach out phone # verified in chart

## 2024-11-22 ENCOUNTER — TELEPHONE (OUTPATIENT)
Dept: HEMATOLOGY/ONCOLOGY | Facility: CLINIC | Age: 64
End: 2024-11-22
Payer: MEDICARE

## 2024-11-22 NOTE — TELEPHONE ENCOUNTER
If they cannot get her in next week then Dr. Clark said to refer her to Dr. Torres for mediport placement.

## 2024-11-22 NOTE — TELEPHONE ENCOUNTER
Patient states that Dr. Collier's office called her this morning and informed her that the soonest they could see her for port placement is 1/08/25. They told her that we would have to contact his office if she needs to be seen sooner.

## 2024-11-25 DIAGNOSIS — Z51.81 ENCOUNTER FOR MONITORING NSAID THERAPY: ICD-10-CM

## 2024-11-25 DIAGNOSIS — C34.11 MALIGNANT NEOPLASM OF UPPER LOBE OF RIGHT LUNG: Primary | ICD-10-CM

## 2024-11-25 DIAGNOSIS — Z79.1 ENCOUNTER FOR MONITORING NSAID THERAPY: ICD-10-CM

## 2024-11-25 NOTE — PROGRESS NOTES
Subjective:       Patient ID: Valarie Rodriguez is a 64 y.o. female.    Pulmonologist: Dr. Zach Martinez    NSCLC Stage IIIA ([id number])--Diagnosed 24  Biopsy/pathology: Cervical mediastinoscopy done 24--multiple biopsies taken of 2R (upper paratracheal) LN and pathology showed poorly differentiated carcinoma, morphologically c/w NSCLC, c/w lung primary site, PD-L1 TPS 80%, CPS 85%, KRAS G12C mutation (approved therapies Adagrasib or Sotorasib), TMB 1.6 (low), MSI cannot be assessed.  Imagin. CT chest w/o contrast 23--7mm RUL spiculated nodule again seen and similar in appearance to comparison, suspicious for neoplasia, mediastinal lymph nodes similar in comparison, emphysema is noted.  2. CT chest w/o contrast done 2/15/24--stable RUL spiculated nodule, development of right hilar adenopathy, enlarged precarinal LN, increased in size compared to previous exam, PET/CT should be considered, emphysema.   3. CT chest w/o contrast 24--stable RUL spiculated nodule measures 7mm, stable small ground glass densities, enlarged precarinal LN measures 2.7X1.9cm, increased in size, right hilar LN vs. Mass measures 2.3X1.6cm, stable, PET should be considered, emphysema.   4. PET/CT done 24--hypermetabolic enlarged right paratracheal LN and prominent right hilum with slightly elevated activity c/w neoplasm, elevated activity of anterior thyroid bed and subcutaneous tissues, likely post-surgical, 7mm spiculated RUL nodule with adjacent vague opacity unchanged, and demonstrates no activity, bilateral L>R trochanteric bursitis, calcified uterine fibroid.   5. MRI brain w/ and w/o contrast done 24--no metastatic disease, mucous retention cyst vs, polyp in right maxillary antrum.  6. CT C/A/P 24--Upper normal to slightly enlarged single pretracheal mediastinal lymph node, nonspecific, bilaterally symmetric apical pleural based fibronodular scarring.  A somewhat irregular focal/nodular opacity in  the right apex noted measuring 8-9 mm, nonspecific, stable from the prior. Otherwise no evidence of malignancy.    Procedures:   Left subclavian mediport placed 12/2/24.    Current treatment plan:   Weekly Carboplatin/Paclitaxel + RT to start 12/9/24 (patient chose to delay treatment).  Durvalumab maintenance    Chief Complaint: Follow-up    HPI  Patient presents for follow-up of NSCLC. She made decision to delay her treatment. She also went for 2nd opinion in [place] and they agreed with the same treatment. Repeat CT done did not show any change as compared to the last scan. Patient did quit smoking over a month ago! She feels good and is ready to start on treatment. She had her mediport placed yesterday.     Past Medical History:   Diagnosis Date    Arthritis     Hyperlipidemia     Lymphadenopathy     Malignant neoplasm of upper lobe of right lung 09/13/2024    Pulmonary nodule     Thyroid disease       Past Surgical History:   Procedure Laterality Date    CHOLECYSTECTOMY      COLONOSCOPY      INSERTION OF TUNNELED CENTRAL VENOUS CATHETER (CVC) WITH SUBCUTANEOUS PORT N/A 12/2/2024    Procedure: [id number];  Surgeon: Melony Collier MD;  Location: [facility] OR;  Service: Cardiothoracic;  Laterality: N/A;  MEDIPORT FOR CHEMO    MEDIASTINOSCOPY N/A 8/29/2024    Procedure: MEDIASTINOSCOPY;  Surgeon: Melony Collier MD;  Location: [facility] OR;  Service: Cardiothoracic;  Laterality: N/A;    THYROIDECTOMY, PARTIAL      TUBAL LIGATION       No family history on file.  Social History     Socioeconomic History    Marital status: Single    Years of education: 11   Tobacco Use    Smoking status: Every Day     Current packs/day: 0.10     Average packs/day: 0.5 packs/day for 35.0 years (17.5 ttl pk-yrs)     Types: Cigarettes     Start date: 11/19/2024     Last attempt to quit: 11/2024   Substance and Sexual Activity    Alcohol use: No     Alcohol/week: 0.0 standard drinks of alcohol    Drug use: No    Sexual activity:  Yes     Partners: Male     Social Drivers of Health     Financial Resource Strain: Low Risk  (11/13/2024)    Overall Financial Resource Strain (CARDIA)     Difficulty of Paying Living Expenses: Not hard at all   Food Insecurity: No Food Insecurity (11/13/2024)    Hunger Vital Sign     Worried About Running Out of Food in the Last Year: Never true     Ran Out of Food in the Last Year: Never true   Physical Activity: Inactive (11/13/2024)    Exercise Vital Sign     Days of Exercise per Week: 0 days     Minutes of Exercise per Session: 0 min   Stress: Stress Concern Present (11/13/2024)    [employer] - Occupational Stress Questionnaire     Feeling of Stress : To some extent   Housing Stability: Unknown (11/13/2024)    Housing Stability Vital Sign     Unable to Pay for Housing in the Last Year: No       Review of patient's allergies indicates:  No Known Allergies   Current Outpatient Medications on File Prior to Visit   Medication Sig Dispense Refill    buPROPion (WELLBUTRIN XL) 300 MG 24 hr tablet Take 300 mg by mouth every morning.      cholecalciferol, vitamin D3, 1,250 mcg (50,000 unit) capsule Take 50,000 Units by mouth every 7 days.      HYDROcodone-acetaminophen (NORCO) 5-325 mg per tablet Take 1 tablet by mouth every 6 (six) hours as needed for Pain. 20 tablet 0    iron-vitamin C 100-250 mg, ICAR-C, 100-250 mg Tab Take 1 tablet by mouth every other day.      levothyroxine (SYNTHROID) 88 MCG tablet Take 88 mcg by mouth once daily.      meloxicam (MOBIC) 15 MG tablet Take 15 mg by mouth daily as needed for Pain.      LORazepam (ATIVAN) 1 MG tablet Take 1 mg by mouth every 12 (twelve) hours as needed for Anxiety. (Patient not taking: Reported on 12/3/2024)       No current facility-administered medications on file prior to visit.      Review of Systems   Constitutional:  Negative for appetite change and unexpected weight change.   HENT:  Negative for mouth sores.    Eyes:  Negative for  "visual disturbance.   Respiratory:  Negative for cough and shortness of breath.    Cardiovascular:  Negative for chest pain.   Gastrointestinal:  Negative for abdominal pain and diarrhea.   Genitourinary:  Negative for frequency.   Musculoskeletal:  Negative for back pain.   Integumentary:  Negative for rash.   Neurological:  Negative for headaches.   Hematological:  Negative for adenopathy.   Psychiatric/Behavioral:  The patient is not nervous/anxious.             Vitals:    12/03/24 1438   BP: (!) 154/74   Pulse: 60   Resp: 18   Temp: 97.9 °F (36.6 °C)   TempSrc: Oral   SpO2: 100%   Weight: 66.6 kg (146 lb 12.8 oz)   Height: 5' 2" (1.575 m)          Physical Exam  Constitutional:       Appearance: Normal appearance.   HENT:      Head: Normocephalic.      Nose: Nose normal.      Mouth/Throat:      Mouth: Mucous membranes are moist.   Eyes:      Extraocular Movements: Extraocular movements intact.      Conjunctiva/sclera: Conjunctivae normal.   Cardiovascular:      Rate and Rhythm: Normal rate and regular rhythm.   Pulmonary:      Effort: Pulmonary effort is normal.      Breath sounds: Normal breath sounds.   Chest:      Comments: Anterior superior chest with incision from mediastinoscopy healed well; left chest wall mediport in place with bandage  Abdominal:      General: Bowel sounds are normal. There is no distension.      Palpations: Abdomen is soft.      Tenderness: There is no abdominal tenderness.   Musculoskeletal:         General: Normal range of motion.   Skin:     General: Skin is warm.   Neurological:      General: No focal deficit present.      Mental Status: She is alert and oriented to person, place, and time.   Psychiatric:         Mood and Affect: Mood normal.         Judgment: Judgment normal.       Lab Visit on 12/03/2024   Component Date Value Ref Range Status    Sodium 12/03/2024 142  136 - 145 mmol/L Final    Potassium 12/03/2024 3.7  3.5 - 5.1 mmol/L Final    Chloride 12/03/2024 108 (H)  98 - " 107 mmol/L Final    CO2 12/03/2024 25  23 - 31 mmol/L Final    Glucose 12/03/2024 104  82 - 115 mg/dL Final    Blood Urea Nitrogen 12/03/2024 19.8  9.8 - 20.1 mg/dL Final    Creatinine 12/03/2024 1.08 (H)  0.55 - 1.02 mg/dL Final    Calcium 12/03/2024 9.2  8.4 - 10.2 mg/dL Final    Protein Total 12/03/2024 7.3  5.8 - 7.6 gm/dL Final    Albumin 12/03/2024 3.7  3.4 - 4.8 g/dL Final    Globulin 12/03/2024 3.6 (H)  2.4 - 3.5 gm/dL Final    Albumin/Globulin Ratio 12/03/2024 1.0 (L)  1.1 - 2.0 ratio Final    Bilirubin Total 12/03/2024 0.2  <=1.5 mg/dL Final    ALP 12/03/2024 73  40 - 150 unit/L Final    ALT 12/03/2024 6  0 - 55 unit/L Final    AST 12/03/2024 12  5 - 34 unit/L Final    eGFR 12/03/2024 57  mL/min/1.73/m2 Final    Anion Gap 12/03/2024 9.0  mEq/L Final    BUN/Creatinine Ratio 12/03/2024 18   Final    WBC 12/03/2024 12.29 (H)  4.50 - 11.50 x10(3)/mcL Final    RBC 12/03/2024 3.95 (L)  4.20 - 5.40 x10(6)/mcL Final    Hgb 12/03/2024 12.6  12.0 - 16.0 g/dL Final    Hct 12/03/2024 38.7  37.0 - 47.0 % Final    MCV 12/03/2024 98.0 (H)  80.0 - 94.0 fL Final    MCH 12/03/2024 31.9 (H)  27.0 - 31.0 pg Final    MCHC 12/03/2024 32.6 (L)  33.0 - 36.0 g/dL Final    RDW 12/03/2024 12.9  11.5 - 17.0 % Final    Platelet 12/03/2024 250  130 - 400 x10(3)/mcL Final    MPV 12/03/2024 10.6 (H)  7.4 - 10.4 fL Final    Neut % 12/03/2024 74.6  % Final    Lymph % 12/03/2024 19.3  % Final    Mono % 12/03/2024 5.7  % Final    Eos % 12/03/2024 0.1  % Final    Basophil % 12/03/2024 0.2  % Final    Lymph # 12/03/2024 2.37  0.6 - 4.6 x10(3)/mcL Final    Neut # 12/03/2024 9.18  2.1 - 9.2 x10(3)/mcL Final    Mono # 12/03/2024 0.70  0.1 - 1.3 x10(3)/mcL Final    Eos # 12/03/2024 0.01  0 - 0.9 x10(3)/mcL Final    Baso # 12/03/2024 0.02  <=0.2 x10(3)/mcL Final    IG# 12/03/2024 0.01  0 - 0.04 x10(3)/mcL Final    IG% 12/03/2024 0.1  % Final   Lab Visit on 11/27/2024   Component Date Value Ref Range Status    WBC 11/27/2024 7.33  4.50 - 11.50  x10(3)/mcL Final    RBC 11/27/2024 4.26  4.20 - 5.40 x10(6)/mcL Final    Hgb 11/27/2024 13.4  12.0 - 16.0 g/dL Final    Hct 11/27/2024 42.6  37.0 - 47.0 % Final    MCV 11/27/2024 100.0 (H)  80.0 - 94.0 fL Final    MCH 11/27/2024 31.5 (H)  27.0 - 31.0 pg Final    MCHC 11/27/2024 31.5 (L)  33.0 - 36.0 g/dL Final    RDW 11/27/2024 13.3  11.5 - 17.0 % Final    Platelet 11/27/2024 184  130 - 400 x10(3)/mcL Final    MPV 11/27/2024 11.0 (H)  7.4 - 10.4 fL Final    Neut % 11/27/2024 67.8  % Final    Lymph % 11/27/2024 26.1  % Final    Mono % 11/27/2024 5.2  % Final    Eos % 11/27/2024 0.4  % Final    Basophil % 11/27/2024 0.4  % Final    Lymph # 11/27/2024 1.91  0.6 - 4.6 x10(3)/mcL Final    Neut # 11/27/2024 4.97  2.1 - 9.2 x10(3)/mcL Final    Mono # 11/27/2024 0.38  0.1 - 1.3 x10(3)/mcL Final    Eos # 11/27/2024 0.03  0 - 0.9 x10(3)/mcL Final    Baso # 11/27/2024 0.03  <=0.2 x10(3)/mcL Final    IG# 11/27/2024 0.01  0 - 0.04 x10(3)/mcL Final    IG% 11/27/2024 0.1  % Final         Assessment:       1. Malignant neoplasm of upper lobe of right lung      Plan:       Patient with Stage IIIA Lung cancer, NSCLC TTF-1+, poorly differentiated, not specified if adenocarcinoma or squamous cell carcinoma, although did confirm with pathology this is NSCLC. [employer] testing with PD-L1 85% and KRAS G12C mutation, diagnosed 8/29/24.   Primary tumor is either occult or may be the 7mm spiculated RUL mass. This was biopsied previously via bronchoscopy and reportedly negative. Will obtain records.  PET shows hypermetabolic right hilar LN and paratracheal LN, and the 7mm RUL lung nodule is not hypermetabolic, though too small to be evaluated by PET.  Brain MRI with no metastatic disease.   Case discussed with Dr. Kay.     Per NCCN guidelines, treatment recommended with definitive concurrent chemoradiation (Category 1) followed by Durvalumab maintenance.   Patient delayed her treatment for family trip and had 2nd opinion in [place] and  agreed with plan.    Plan to begin treatment next week. She meets again with Dr. Kay on Thursday.  Treatment next week #1 Carboplatin/Paclitaxel on Tuesday 12/10/24. No need for labs again next week since she did them today.  If radiation will be delayed, will need to push back chemotherapy start.   F/u T/D visit with labs on 12/16/24 prior to week #2.     Once she has completed chemo/RT, plan for repeat CT chest 4 weeks after and begin Durvalumab maintenance.        All questions answered at this time.     Smoking cessation strongly encouraged and she has quit for over a month.     Jeannie Clark MD    Treatment Plan Information   OP NSCLC PACLITAXEL + CARBOPLATIN (AUC) QW + RADIATION Jeannie Clark MD   Associated diagnosis: Malignant neoplasm of upper lobe of right lung Stage IIIA cT1a, cN2, cM0 noted on 9/13/2024   Line of treatment: First Line  Treatment Goal: Curative     Upcoming Treatment Dates - OP NSCLC PACLITAXEL + CARBOPLATIN (AUC) QW + RADIATION    12/10/2024       Pre-Medications       famotidine (PF) injection 20 mg       diphenhydrAMINE injection 25 mg       Chemotherapy       PACLitaxeL (TAXOL) 45 mg/m2 = 78 mg in 0.9% NaCl 250 mL chemo infusion       CARBOplatin (PARAPLATIN) in 0.9% NaCl 250 mL chemo infusion       Antiemetics       palonosetron 0.25mg/dexAMETHasone 12mg in NS IVPB 0.25 mg 50 mL  12/17/2024       Pre-Medications       famotidine (PF) injection 20 mg       diphenhydrAMINE injection 25 mg       Chemotherapy       PACLitaxeL (TAXOL) 45 mg/m2 = 78 mg in 0.9% NaCl 250 mL chemo infusion       CARBOplatin (PARAPLATIN) in 0.9% NaCl 250 mL chemo infusion       Antiemetics       palonosetron 0.25mg/dexAMETHasone 12mg in NS IVPB 0.25 mg 50 mL  12/24/2024       Pre-Medications       famotidine (PF) injection 20 mg       diphenhydrAMINE injection 25 mg       Chemotherapy       PACLitaxeL (TAXOL) 45 mg/m2 = 78 mg in 0.9% NaCl 250 mL chemo infusion       CARBOplatin (PARAPLATIN) in 0.9% NaCl  250 mL chemo infusion       Antiemetics       palonosetron 0.25mg/dexAMETHasone 12mg in NS IVPB 0.25 mg 50 mL  12/31/2024       Pre-Medications       famotidine (PF) injection 20 mg       diphenhydrAMINE injection 25 mg       Chemotherapy       PACLitaxeL (TAXOL) 45 mg/m2 = 78 mg in 0.9% NaCl 250 mL chemo infusion       CARBOplatin (PARAPLATIN) in 0.9% NaCl 250 mL chemo infusion       Antiemetics       palonosetron 0.25mg/dexAMETHasone 12mg in NS IVPB 0.25 mg 50 mL

## 2024-11-26 ENCOUNTER — ANESTHESIA EVENT (OUTPATIENT)
Dept: SURGERY | Facility: HOSPITAL | Age: 64
End: 2024-11-26
Payer: MEDICARE

## 2024-11-26 RX ORDER — MELOXICAM 15 MG/1
15 TABLET ORAL DAILY PRN
COMMUNITY

## 2024-11-27 ENCOUNTER — HOSPITAL ENCOUNTER (OUTPATIENT)
Dept: RADIOLOGY | Facility: HOSPITAL | Age: 64
Discharge: HOME | End: 2024-11-27
Attending: INTERNAL MEDICINE
Payer: MEDICARE

## 2024-11-27 DIAGNOSIS — C34.11 MALIGNANT NEOPLASM OF UPPER LOBE OF RIGHT LUNG: ICD-10-CM

## 2024-11-27 PROCEDURE — 71260 CT THORAX DX C+: CPT | Mod: TC

## 2024-11-27 PROCEDURE — 25500020 PHARM REV CODE 255: Performed by: INTERNAL MEDICINE

## 2024-11-27 RX ORDER — DIATRIZOATE MEGLUMINE AND DIATRIZOATE SODIUM 660; 100 MG/ML; MG/ML
30 SOLUTION ORAL; RECTAL
Status: COMPLETED | OUTPATIENT
Start: 2024-11-27 | End: 2024-11-27

## 2024-11-27 RX ADMIN — DIATRIZOATE MEGLUMINE AND DIATRIZOATE SODIUM 30 ML: 660; 100 LIQUID ORAL; RECTAL at 09:11

## 2024-11-27 RX ADMIN — IOHEXOL 100 ML: 350 INJECTION, SOLUTION INTRAVENOUS at 10:11

## 2024-11-27 NOTE — PRE-PROCEDURE INSTRUCTIONS
"[facility]: Outpatient Surgery  Preprocedure Check-In Instructions     Your arrival time for your surgery or procedure is __your surgeon will call you with your time of arrival____.  We ask patients to arrive about 2 hours before surgery to allow for enough time to review your health history & medications, start your IV, complete any outstanding labwork or tests, and meet your Anesthesiologist.    Expectations: "Because of inconsistent procedure completion times, an unexpected wait may occur. The Physicians would like you to be here to prepare in the event they run ahead of time. We will make you as comfortable as possible and keep you informed. We apologize in advance if this happens."    You will arrive at [facility], [address], [place].  Enter through the [facility] entrance next to the Emergency Room, and come to the 6th floor to the Outpatient Surgery Department.     Visitory Policy:  You are allowed 2 adult visitors to be with you in the hospital. All hospital visitors should be in good current health.  No small children.     What to Bring:  Please have your ID, insurance cards, and all home medication bottles with you at check in.  Bring your CPAP machine if one is used at home.     Fasting:  Nothing to eat after midnight the night before your procedure. This includes no gum and/or tobacco products. You may have clear liquids limited to only: WATER, GATORADE, POWERADE and children can also have PEDIALYTE AND/OR APPLE JUICE , up until 2 hours before your arrival time.   Follow your doctor's instructions for taking any medications on the morning of your procedure.  If no instructions for taking medications were given, do not take any medications but bring your medications in their bottles to your procedure check in.     Follow your doctor's preoperative instructions regarding skin prep, bowel prep, bathing, or showering prior to your procedure.  If any special " soaps were provided to you, please use according to your doctor's instructions. If no instructions were given from your doctor, take a good bath or shower with antibacterial soap the night before and the morning of your procedure.  On the morning of procedure, wear loose, comfortable clothing.  No lotions, makeup, perfumes, colognes, deodorant, or jewelry to your procedure.  Removable items (glasses, contact lenses, dentures, retainers, hearing aids) need to be removed for your procedure.  Bring your storage containers for these items if you wear them.     Artificial nails, body jewelry, eyelash extensions, and/or hair extensions with metal clips are not allowed during your surgery.  If you currently wear any of these items, please arrange for them to be removed prior to your arrival to the hospital.     Outpatient or Same Day Surgeries:  Any patients receiving sedation/anesthesia are advised not to drive for 24 hours after their procedure.  We do not allow patients to drive themselves home after discharge.  If you are going home after your procedure, please have someone available to drive you home from the hospital.        You may call the Outpatient Surgery Department at [phone] with any questions or concerns.  We are looking forward to meeting you and taking great care of you for your procedure.  Thank you for choosing [facility] for your surgical needs.

## 2024-12-01 NOTE — ANESTHESIA PREPROCEDURE EVALUATION
"                                                                                                             12/01/2024  Valarie Rodriguez is a 64 y.o., female with lung Ca and secondary neoplasm of mediastinum.  She is here today for port-a-cath placement.    "Chief Complaint: Results and Lung Cancer     HPI:  64-year-old female returns here with stage III poorly differentiated carcinomaPD-L1 85% and KRAS G12C mutated.  Patient had biopsy-proven of mediastinal node.  Last imaging performed in September 20, 2024.  ECOG status 1 accompanied by her daughter          Past Medical History:   Diagnosis Date    Hyperlipidemia      Lymphadenopathy      Malignant neoplasm of upper lobe of right lung 09/13/2024    Pulmonary nodule      Thyroid disease      ...  Assessment:      1. 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 Secondary malignant neoplasm of mediastinum       Plan:      Extensive conversation with she and her daughter reviewed information with her information from up-to-date followed to her as well on stage III non-small cell lung cancer I agree entirely with treatment carboplatin and Taxol and maintenance immunotherapy.  40% 5 year survival.  At this time she is brought many of images we will loaded into our system they have if needed.  I did tell the patient it has been almost 2 months since her staging begin in her primary oncologist may wish to repeat imaging such as CT chest abdomen pelvis before starting to make sure there is no evidence of metastatic spread discussed implications of answered questions with her; encouraged for tobacco cessation.  Referral made to tobacco cessation program"      Pre-op Assessment    I have reviewed the Patient Summary Reports.     I have reviewed the Nursing Notes. I have reviewed the NPO Status.   I have reviewed the Medications.     Review of Systems  Anesthesia " Hx:  No problems with previous Anesthesia                Social:  Smoker       Hematology/Oncology:                      Current/Recent Cancer.                Cardiovascular:  Exercise tolerance: good    Denies Hypertension.   Denies MI.            hyperlipidemia                               Pulmonary:  Pulmonary Normal        Denies Sleep Apnea.                Renal/:  Renal/ Normal                 Hepatic/GI:  Hepatic/GI Normal     Denies GERD.                Neurological:  Neurology Normal                                      Endocrine:   Hypothyroidism              Physical Exam  General: Alert and Oriented    Airway:  Mallampati: II   Mouth Opening: Normal  TM Distance: Normal  Neck ROM: Normal ROM    Dental:  Intact    Chest/Lungs:  Clear to auscultation    Heart:  Rate: Normal  Rhythm: Regular Rhythm  Sounds: Normal        Anesthesia Plan  Type of Anesthesia, risks & benefits discussed:    Anesthesia Type: Gen Supraglottic Airway  Intra-op Monitoring Plan: Standard ASA Monitors  Post Op Pain Control Plan: multimodal analgesia  Airway Plan: , Post-Induction  Informed Consent: Informed consent signed with the Patient and all parties understand the risks and agree with anesthesia plan.  All questions answered. Patient consented to blood products? Yes  ASA Score: 2  Day of Surgery Review of History & Physical: H&P Update referred to the surgeon/provider.    Ready For Surgery From Anesthesia Perspective.     .

## 2024-12-02 ENCOUNTER — ANESTHESIA (OUTPATIENT)
Dept: SURGERY | Facility: HOSPITAL | Age: 64
End: 2024-12-02
Payer: MEDICARE

## 2024-12-02 ENCOUNTER — HOSPITAL ENCOUNTER (OUTPATIENT)
Facility: HOSPITAL | Age: 64
Discharge: HOME | End: 2024-12-02
Attending: THORACIC SURGERY (CARDIOTHORACIC VASCULAR SURGERY) | Admitting: THORACIC SURGERY (CARDIOTHORACIC VASCULAR SURGERY)
Payer: MEDICARE

## 2024-12-02 VITALS
BODY MASS INDEX: 26.17 KG/M2 | OXYGEN SATURATION: 96 % | TEMPERATURE: 97 F | SYSTOLIC BLOOD PRESSURE: 168 MMHG | HEART RATE: 64 BPM | DIASTOLIC BLOOD PRESSURE: 78 MMHG | RESPIRATION RATE: 16 BRPM | WEIGHT: 142.19 LBS | HEIGHT: 62 IN

## 2024-12-02 DIAGNOSIS — C34.11 MALIGNANT NEOPLASM OF UPPER LOBE OF RIGHT LUNG: ICD-10-CM

## 2024-12-02 PROCEDURE — 37000009 HC ANESTHESIA EA ADD 15 MINS: Performed by: THORACIC SURGERY (CARDIOTHORACIC VASCULAR SURGERY)

## 2024-12-02 PROCEDURE — 77001 FLUOROGUIDE FOR VEIN DEVICE: CPT | Mod: 26,,, | Performed by: THORACIC SURGERY (CARDIOTHORACIC VASCULAR SURGERY)

## 2024-12-02 PROCEDURE — C1788 PORT, INDWELLING, IMP: HCPCS | Performed by: THORACIC SURGERY (CARDIOTHORACIC VASCULAR SURGERY)

## 2024-12-02 PROCEDURE — 37000008 HC ANESTHESIA 1ST 15 MINUTES: Performed by: THORACIC SURGERY (CARDIOTHORACIC VASCULAR SURGERY)

## 2024-12-02 PROCEDURE — 25000003 PHARM REV CODE 250: Performed by: PHYSICIAN ASSISTANT

## 2024-12-02 PROCEDURE — 36561 INSERT TUNNELED CV CATH: CPT | Mod: LT,,, | Performed by: THORACIC SURGERY (CARDIOTHORACIC VASCULAR SURGERY)

## 2024-12-02 PROCEDURE — 63600175 PHARM REV CODE 636 W HCPCS: Performed by: THORACIC SURGERY (CARDIOTHORACIC VASCULAR SURGERY)

## 2024-12-02 PROCEDURE — 71000016 HC POSTOP RECOV ADDL HR: Performed by: THORACIC SURGERY (CARDIOTHORACIC VASCULAR SURGERY)

## 2024-12-02 PROCEDURE — 25000003 PHARM REV CODE 250: Performed by: NURSE ANESTHETIST, CERTIFIED REGISTERED

## 2024-12-02 PROCEDURE — 36000706: Performed by: THORACIC SURGERY (CARDIOTHORACIC VASCULAR SURGERY)

## 2024-12-02 PROCEDURE — 71000015 HC POSTOP RECOV 1ST HR: Performed by: THORACIC SURGERY (CARDIOTHORACIC VASCULAR SURGERY)

## 2024-12-02 PROCEDURE — 63600175 PHARM REV CODE 636 W HCPCS: Performed by: NURSE ANESTHETIST, CERTIFIED REGISTERED

## 2024-12-02 PROCEDURE — 36000707: Performed by: THORACIC SURGERY (CARDIOTHORACIC VASCULAR SURGERY)

## 2024-12-02 DEVICE — POWERPORT SLIM IMPLANTABLE PORT WITH ATTACHABLE 6F CHRONOFLEX OPEN-ENDED SINGLE-LUMEN VENOUS CATHETER INTERMEDIATE KIT (WITH SUTURE PLUGS)
Type: IMPLANTABLE DEVICE | Site: CHEST  WALL | Status: FUNCTIONAL
Brand: POWERPORT, CHRONOFLEX

## 2024-12-02 RX ORDER — LIDOCAINE HYDROCHLORIDE 20 MG/ML
INJECTION, SOLUTION EPIDURAL; INFILTRATION; INTRACAUDAL; PERINEURAL
Status: DISCONTINUED | OUTPATIENT
Start: 2024-12-02 | End: 2024-12-02

## 2024-12-02 RX ORDER — HEPARIN SOD,PORCINE/0.9 % NACL 1000/500ML
INTRAVENOUS SOLUTION INTRAVENOUS
Status: DISCONTINUED | OUTPATIENT
Start: 2024-12-02 | End: 2024-12-02 | Stop reason: HOSPADM

## 2024-12-02 RX ORDER — HYDROCODONE BITARTRATE AND ACETAMINOPHEN 5; 325 MG/1; MG/1
1 TABLET ORAL EVERY 6 HOURS PRN
Qty: 20 TABLET | Refills: 0 | Status: SHIPPED | OUTPATIENT
Start: 2024-12-02

## 2024-12-02 RX ORDER — HYDROCODONE BITARTRATE AND ACETAMINOPHEN 5; 325 MG/1; MG/1
1 TABLET ORAL EVERY 4 HOURS PRN
Status: DISCONTINUED | OUTPATIENT
Start: 2024-12-02 | End: 2024-12-02 | Stop reason: HOSPADM

## 2024-12-02 RX ORDER — GLYCOPYRROLATE 0.2 MG/ML
INJECTION INTRAMUSCULAR; INTRAVENOUS
Status: DISCONTINUED | OUTPATIENT
Start: 2024-12-02 | End: 2024-12-02

## 2024-12-02 RX ORDER — DEXAMETHASONE SODIUM PHOSPHATE 4 MG/ML
INJECTION, SOLUTION INTRA-ARTICULAR; INTRALESIONAL; INTRAMUSCULAR; INTRAVENOUS; SOFT TISSUE
Status: DISCONTINUED | OUTPATIENT
Start: 2024-12-02 | End: 2024-12-02

## 2024-12-02 RX ORDER — ACETAMINOPHEN 10 MG/ML
1000 INJECTION, SOLUTION INTRAVENOUS ONCE
OUTPATIENT
Start: 2024-12-02 | End: 2024-12-02

## 2024-12-02 RX ORDER — CEFUROXIME SODIUM 1.5 G/16ML
1.5 INJECTION, POWDER, FOR SOLUTION INTRAVENOUS
Status: COMPLETED | OUTPATIENT
Start: 2024-12-02 | End: 2024-12-02

## 2024-12-02 RX ORDER — MIDAZOLAM HYDROCHLORIDE 2 MG/2ML
2 INJECTION, SOLUTION INTRAMUSCULAR; INTRAVENOUS ONCE AS NEEDED
OUTPATIENT
Start: 2024-12-02 | End: 2036-04-29

## 2024-12-02 RX ORDER — METOCLOPRAMIDE HYDROCHLORIDE 5 MG/ML
5 INJECTION INTRAMUSCULAR; INTRAVENOUS EVERY 6 HOURS PRN
Status: DISCONTINUED | OUTPATIENT
Start: 2024-12-02 | End: 2024-12-02 | Stop reason: HOSPADM

## 2024-12-02 RX ORDER — ONDANSETRON HYDROCHLORIDE 2 MG/ML
4 INJECTION, SOLUTION INTRAVENOUS DAILY PRN
OUTPATIENT
Start: 2024-12-02

## 2024-12-02 RX ORDER — LIDOCAINE HYDROCHLORIDE 10 MG/ML
INJECTION, SOLUTION INFILTRATION; PERINEURAL
Status: DISCONTINUED | OUTPATIENT
Start: 2024-12-02 | End: 2024-12-02 | Stop reason: HOSPADM

## 2024-12-02 RX ORDER — MIDAZOLAM HYDROCHLORIDE 1 MG/ML
INJECTION INTRAMUSCULAR; INTRAVENOUS
Status: DISCONTINUED | OUTPATIENT
Start: 2024-12-02 | End: 2024-12-02

## 2024-12-02 RX ORDER — DIPHENHYDRAMINE HYDROCHLORIDE 50 MG/ML
25 INJECTION INTRAMUSCULAR; INTRAVENOUS EVERY 6 HOURS PRN
OUTPATIENT
Start: 2024-12-02

## 2024-12-02 RX ORDER — LIDOCAINE HYDROCHLORIDE 10 MG/ML
1 INJECTION, SOLUTION EPIDURAL; INFILTRATION; INTRACAUDAL; PERINEURAL ONCE
OUTPATIENT
Start: 2024-12-02 | End: 2024-12-02

## 2024-12-02 RX ORDER — ONDANSETRON 4 MG/1
8 TABLET, ORALLY DISINTEGRATING ORAL EVERY 8 HOURS PRN
Status: DISCONTINUED | OUTPATIENT
Start: 2024-12-02 | End: 2024-12-02 | Stop reason: HOSPADM

## 2024-12-02 RX ORDER — HYDROMORPHONE HYDROCHLORIDE 2 MG/ML
0.2 INJECTION, SOLUTION INTRAMUSCULAR; INTRAVENOUS; SUBCUTANEOUS EVERY 5 MIN PRN
OUTPATIENT
Start: 2024-12-02

## 2024-12-02 RX ORDER — GLUCAGON 1 MG
1 KIT INJECTION
OUTPATIENT
Start: 2024-12-02

## 2024-12-02 RX ORDER — KETAMINE HYDROCHLORIDE 100 MG/ML
INJECTION, SOLUTION INTRAMUSCULAR; INTRAVENOUS
Status: DISCONTINUED | OUTPATIENT
Start: 2024-12-02 | End: 2024-12-02

## 2024-12-02 RX ORDER — HYDROCODONE BITARTRATE AND ACETAMINOPHEN 5; 325 MG/1; MG/1
1 TABLET ORAL EVERY 6 HOURS PRN
Status: DISCONTINUED | OUTPATIENT
Start: 2024-12-02 | End: 2024-12-02 | Stop reason: HOSPADM

## 2024-12-02 RX ORDER — ONDANSETRON HYDROCHLORIDE 2 MG/ML
INJECTION, SOLUTION INTRAVENOUS
Status: DISCONTINUED | OUTPATIENT
Start: 2024-12-02 | End: 2024-12-02

## 2024-12-02 RX ORDER — SODIUM CHLORIDE, SODIUM GLUCONATE, SODIUM ACETATE, POTASSIUM CHLORIDE AND MAGNESIUM CHLORIDE 30; 37; 368; 526; 502 MG/100ML; MG/100ML; MG/100ML; MG/100ML; MG/100ML
INJECTION, SOLUTION INTRAVENOUS CONTINUOUS
OUTPATIENT
Start: 2024-12-02 | End: 2025-01-01

## 2024-12-02 RX ORDER — PROPOFOL 10 MG/ML
VIAL (ML) INTRAVENOUS
Status: DISCONTINUED | OUTPATIENT
Start: 2024-12-02 | End: 2024-12-02

## 2024-12-02 RX ORDER — MUPIROCIN 20 MG/G
1 OINTMENT TOPICAL 2 TIMES DAILY
Status: DISCONTINUED | OUTPATIENT
Start: 2024-12-02 | End: 2024-12-02 | Stop reason: HOSPADM

## 2024-12-02 RX ADMIN — CEFUROXIME 1.5 G: 1.5 INJECTION, POWDER, FOR SOLUTION INTRAVENOUS at 07:12

## 2024-12-02 RX ADMIN — MIDAZOLAM HYDROCHLORIDE 2 MG: 1 INJECTION, SOLUTION INTRAMUSCULAR; INTRAVENOUS at 07:12

## 2024-12-02 RX ADMIN — HYDROCODONE BITARTRATE AND ACETAMINOPHEN 1 TABLET: 5; 325 TABLET ORAL at 09:12

## 2024-12-02 RX ADMIN — PROPOFOL 50 MCG/KG/MIN: 10 INJECTION, EMULSION INTRAVENOUS at 07:12

## 2024-12-02 RX ADMIN — PROPOFOL 20 MG: 10 INJECTION, EMULSION INTRAVENOUS at 07:12

## 2024-12-02 RX ADMIN — PROPOFOL 50 MG: 10 INJECTION, EMULSION INTRAVENOUS at 07:12

## 2024-12-02 RX ADMIN — PROPOFOL 30 MG: 10 INJECTION, EMULSION INTRAVENOUS at 07:12

## 2024-12-02 RX ADMIN — GLYCOPYRROLATE 0.2 MG: 0.2 INJECTION INTRAMUSCULAR; INTRAVENOUS at 07:12

## 2024-12-02 RX ADMIN — SODIUM CHLORIDE: 9 INJECTION, SOLUTION INTRAVENOUS at 07:12

## 2024-12-02 RX ADMIN — ONDANSETRON 4 MG: 2 INJECTION INTRAMUSCULAR; INTRAVENOUS at 08:12

## 2024-12-02 RX ADMIN — LIDOCAINE HYDROCHLORIDE 50 MG: 20 INJECTION, SOLUTION INTRAVENOUS at 07:12

## 2024-12-02 RX ADMIN — KETAMINE HYDROCHLORIDE 25 MG: 100 INJECTION, SOLUTION, CONCENTRATE INTRAMUSCULAR; INTRAVENOUS at 07:12

## 2024-12-02 RX ADMIN — DEXAMETHASONE SODIUM PHOSPHATE 4 MG: 4 INJECTION, SOLUTION INTRA-ARTICULAR; INTRALESIONAL; INTRAMUSCULAR; INTRAVENOUS; SOFT TISSUE at 08:12

## 2024-12-02 NOTE — TRANSFER OF CARE
"Anesthesia Transfer of Care Note    Patient: Valarie Rodriguez    Procedure(s) Performed: Procedure(s) (LRB):  [id number] (N/A)    Patient location: Other: phase 2 outpatient    Anesthesia Type: general    Transport from OR: Transported from OR on room air with adequate spontaneous ventilation    Post pain: adequate analgesia    Post vital signs: stable    Level of consciousness: sedated and awake    Nausea/Vomiting: no nausea/vomiting    Complications: none    Transfer of care protocol was followed    Last vitals: Visit Vitals  /77   Pulse 79   Temp 36 °C (96.8 °F)   Resp 16   Ht 5' 2" (1.575 m)   Wt 64.5 kg (142 lb 3.2 oz)   SpO2 96%   Breastfeeding No   BMI 26.01 kg/m²     "

## 2024-12-02 NOTE — OP NOTE
[facility]                       [address] [id number]    PATIENT NAME:      MARIA TERESA LIM  YOB: 1960  CSN:               [id number]  MRN:               [id number]  ADMIT DATE:        12/02/2024 05:40:00  PHYSICIAN:         Melony Collier MD                          OPERATIVE REPORT      DATE OF SURGERY:    12/02/2024 00:00:00    SURGEON:  Melony Collier MD    PREOPERATIVE DIAGNOSIS:  Need for MediPort.    PROCEDURE:  Placement of left subclavian 6-Bengali MediPort under fluoroscopy   guidance.    POSTOPERATIVE DIAGNOSIS:  Need for MediPort.    TECHNIQUE:  Under informed consent, the patient was taken to the OR, put to the   supine position.  MAC sedation was given.  The skin over the chest and neck was   prepped and draped in usual sterile fashion.  IV antibiotics were administered.    The skin over the right chest was instilled with local anesthesia.  I tried to   access the subclavian vein; however, the wire would not thread.  Again, the IJ   was accessed.  The wire would thread into the subclavian, but does not thread to   the SVC.  I elected to go on the left side.  This was tight space between her   implant and the clavicle.  I was able to access the subclavian with no problem.    Wire was advanced all the way in the IVC.  The pocket was made and the port was   positioned well in the pocket.  This was all far from the implant.  The dilator   and introducer were advanced over the wire followed by removal of the wire and   the dilator.  The catheter was cut to the appropriate lengths and was put   through the introducer.  The introducer was removed.  The port flushed and   aspirated very well.  The wounds were irrigated and closed in layers of   absorbable sutures.        ______________________________  MD STALIN Ravi/AQS  DD:  12/02/2024  Time:  10:04AM  DT:  12/02/2024  Time:   10:46AM  Job #:  [id number]      OPERATIVE REPORT

## 2024-12-02 NOTE — H&P
[facility] - Periop Services  History & Physical  Cardiothoracic Surgery    SUBJECTIVE:     Chief Complaint/Reason for Admission: lung cancer    History of Present Illness:  Patient is a 64 y.o. female presents with lung cancer, needs mediport.      Family History of Heart Disease: no    No current facility-administered medications on file prior to encounter.     Current Outpatient Medications on File Prior to Encounter   Medication Sig Dispense Refill    buPROPion (WELLBUTRIN XL) 300 MG 24 hr tablet Take 300 mg by mouth every morning.      cholecalciferol, vitamin D3, 1,250 mcg (50,000 unit) capsule Take 50,000 Units by mouth every 7 days.      iron-vitamin C 100-250 mg, ICAR-C, 100-250 mg Tab Take 1 tablet by mouth every other day.      levothyroxine (SYNTHROID) 88 MCG tablet Take 88 mcg by mouth once daily.      meloxicam (MOBIC) 15 MG tablet Take 15 mg by mouth daily as needed for Pain.      dicyclomine (BENTYL) 20 mg tablet  (Patient not taking: Reported on 9/30/2024)      gabapentin (NEURONTIN) 300 MG capsule Take 300 mg by mouth. (Patient not taking: Reported on 9/30/2024)      LORazepam (ATIVAN) 1 MG tablet Take 1 mg by mouth every 12 (twelve) hours as needed for Anxiety.      traZODone (DESYREL) 50 MG tablet TAKE 1 TO 2 TABLETS BY MOUTH NIGHTLY AS NEEDED FOR SLEEP (Patient not taking: Reported on 9/30/2024)          Review of patient's allergies indicates:  No Known Allergies     Past Medical History:   Diagnosis Date    Arthritis     Hyperlipidemia     Lymphadenopathy     Malignant neoplasm of upper lobe of right lung 09/13/2024    Pulmonary nodule     Thyroid disease         Past Surgical History:   Procedure Laterality Date    CHOLECYSTECTOMY      COLONOSCOPY      MEDIASTINOSCOPY N/A 8/29/2024    Procedure: MEDIASTINOSCOPY;  Surgeon: Melony Collier MD;  Location: [facility];  Service: Cardiothoracic;  Laterality: N/A;    THYROIDECTOMY, PARTIAL      TUBAL LIGATION             Review of  Systems:  Review of Systems   All other systems reviewed and are negative.       OBJECTIVE:        Physical Exam:  Physical Exam     Laboratory:  I have reviewed all pertinent lab results within the past 24 hours.    Diagnostic Results:  CT: Reviewed          ASSESSMENT/PLAN:     A;:Lung cancer in meed of chemotherapy  P: Mediport

## 2024-12-03 ENCOUNTER — LAB VISIT (OUTPATIENT)
Dept: LAB | Facility: HOSPITAL | Age: 64
End: 2024-12-03
Attending: INTERNAL MEDICINE
Payer: MEDICARE

## 2024-12-03 ENCOUNTER — OFFICE VISIT (OUTPATIENT)
Dept: HEMATOLOGY/ONCOLOGY | Facility: CLINIC | Age: 64
End: 2024-12-03
Payer: MEDICARE

## 2024-12-03 VITALS
RESPIRATION RATE: 18 BRPM | OXYGEN SATURATION: 100 % | WEIGHT: 146.81 LBS | HEIGHT: 62 IN | SYSTOLIC BLOOD PRESSURE: 154 MMHG | HEART RATE: 60 BPM | TEMPERATURE: 98 F | BODY MASS INDEX: 27.02 KG/M2 | DIASTOLIC BLOOD PRESSURE: 74 MMHG

## 2024-12-03 DIAGNOSIS — C78.00: ICD-10-CM

## 2024-12-03 DIAGNOSIS — C34.11 MALIGNANT NEOPLASM OF UPPER LOBE OF RIGHT LUNG: ICD-10-CM

## 2024-12-03 DIAGNOSIS — C34.11 MALIGNANT NEOPLASM OF UPPER LOBE OF RIGHT LUNG: Primary | ICD-10-CM

## 2024-12-03 LAB
ALBUMIN SERPL-MCNC: 3.7 G/DL (ref 3.4–4.8)
ALBUMIN/GLOB SERPL: 1 RATIO (ref 1.1–2)
ALP SERPL-CCNC: 73 UNIT/L (ref 40–150)
ALT SERPL-CCNC: 6 UNIT/L (ref 0–55)
ANION GAP SERPL CALC-SCNC: 9 MEQ/L
AST SERPL-CCNC: 12 UNIT/L (ref 5–34)
BASOPHILS # BLD AUTO: 0.02 X10(3)/MCL
BASOPHILS NFR BLD AUTO: 0.2 %
BILIRUB SERPL-MCNC: 0.2 MG/DL
BUN SERPL-MCNC: 19.8 MG/DL (ref 9.8–20.1)
CALCIUM SERPL-MCNC: 9.2 MG/DL (ref 8.4–10.2)
CHLORIDE SERPL-SCNC: 108 MMOL/L (ref 98–107)
CO2 SERPL-SCNC: 25 MMOL/L (ref 23–31)
CREAT SERPL-MCNC: 1.08 MG/DL (ref 0.55–1.02)
CREAT/UREA NIT SERPL: 18
EOSINOPHIL # BLD AUTO: 0.01 X10(3)/MCL (ref 0–0.9)
EOSINOPHIL NFR BLD AUTO: 0.1 %
ERYTHROCYTE [DISTWIDTH] IN BLOOD BY AUTOMATED COUNT: 12.9 % (ref 11.5–17)
GFR SERPLBLD CREATININE-BSD FMLA CKD-EPI: 57 ML/MIN/1.73/M2
GLOBULIN SER-MCNC: 3.6 GM/DL (ref 2.4–3.5)
GLUCOSE SERPL-MCNC: 104 MG/DL (ref 82–115)
HCT VFR BLD AUTO: 38.7 % (ref 37–47)
HGB BLD-MCNC: 12.6 G/DL (ref 12–16)
IMM GRANULOCYTES # BLD AUTO: 0.01 X10(3)/MCL (ref 0–0.04)
IMM GRANULOCYTES NFR BLD AUTO: 0.1 %
LYMPHOCYTES # BLD AUTO: 2.37 X10(3)/MCL (ref 0.6–4.6)
LYMPHOCYTES NFR BLD AUTO: 19.3 %
MCH RBC QN AUTO: 31.9 PG (ref 27–31)
MCHC RBC AUTO-ENTMCNC: 32.6 G/DL (ref 33–36)
MCV RBC AUTO: 98 FL (ref 80–94)
MONOCYTES # BLD AUTO: 0.7 X10(3)/MCL (ref 0.1–1.3)
MONOCYTES NFR BLD AUTO: 5.7 %
NEUTROPHILS # BLD AUTO: 9.18 X10(3)/MCL (ref 2.1–9.2)
NEUTROPHILS NFR BLD AUTO: 74.6 %
PLATELET # BLD AUTO: 250 X10(3)/MCL (ref 130–400)
PMV BLD AUTO: 10.6 FL (ref 7.4–10.4)
POTASSIUM SERPL-SCNC: 3.7 MMOL/L (ref 3.5–5.1)
PROT SERPL-MCNC: 7.3 GM/DL (ref 5.8–7.6)
RBC # BLD AUTO: 3.95 X10(6)/MCL (ref 4.2–5.4)
SODIUM SERPL-SCNC: 142 MMOL/L (ref 136–145)
WBC # BLD AUTO: 12.29 X10(3)/MCL (ref 4.5–11.5)

## 2024-12-03 PROCEDURE — 3077F SYST BP >= 140 MM HG: CPT | Mod: CPTII,S$GLB,, | Performed by: INTERNAL MEDICINE

## 2024-12-03 PROCEDURE — 3008F BODY MASS INDEX DOCD: CPT | Mod: CPTII,S$GLB,, | Performed by: INTERNAL MEDICINE

## 2024-12-03 PROCEDURE — 80053 COMPREHEN METABOLIC PANEL: CPT

## 2024-12-03 PROCEDURE — 3288F FALL RISK ASSESSMENT DOCD: CPT | Mod: CPTII,S$GLB,, | Performed by: INTERNAL MEDICINE

## 2024-12-03 PROCEDURE — 1126F AMNT PAIN NOTED NONE PRSNT: CPT | Mod: CPTII,S$GLB,, | Performed by: INTERNAL MEDICINE

## 2024-12-03 PROCEDURE — 1159F MED LIST DOCD IN RCRD: CPT | Mod: CPTII,S$GLB,, | Performed by: INTERNAL MEDICINE

## 2024-12-03 PROCEDURE — 99215 OFFICE O/P EST HI 40 MIN: CPT | Mod: S$GLB,,, | Performed by: INTERNAL MEDICINE

## 2024-12-03 PROCEDURE — 3078F DIAST BP <80 MM HG: CPT | Mod: CPTII,S$GLB,, | Performed by: INTERNAL MEDICINE

## 2024-12-03 PROCEDURE — 99999 PR PBB SHADOW E&M-EST. PATIENT-LVL III: CPT | Mod: PBBFAC,,, | Performed by: INTERNAL MEDICINE

## 2024-12-03 PROCEDURE — 36415 COLL VENOUS BLD VENIPUNCTURE: CPT

## 2024-12-03 PROCEDURE — 1101F PT FALLS ASSESS-DOCD LE1/YR: CPT | Mod: CPTII,S$GLB,, | Performed by: INTERNAL MEDICINE

## 2024-12-03 PROCEDURE — 1160F RVW MEDS BY RX/DR IN RCRD: CPT | Mod: CPTII,S$GLB,, | Performed by: INTERNAL MEDICINE

## 2024-12-03 PROCEDURE — 85025 COMPLETE CBC W/AUTO DIFF WBC: CPT

## 2024-12-03 RX ORDER — ONDANSETRON HYDROCHLORIDE 8 MG/1
8 TABLET, FILM COATED ORAL EVERY 8 HOURS PRN
Qty: 20 TABLET | Refills: 1 | Status: SHIPPED | OUTPATIENT
Start: 2024-12-03

## 2024-12-03 RX ORDER — PROCHLORPERAZINE MALEATE 5 MG
5 TABLET ORAL EVERY 6 HOURS PRN
Qty: 20 TABLET | Refills: 1 | Status: SHIPPED | OUTPATIENT
Start: 2024-12-03 | End: 2025-12-03

## 2024-12-04 NOTE — ANESTHESIA POSTPROCEDURE EVALUATION
Anesthesia Post Evaluation    Patient: Valarie Rodriguez    Procedure(s) Performed: Procedure(s) (LRB):  [id number] (N/A)    Final Anesthesia Type: general      Patient location during evaluation: PACU  Patient participation: Yes- Able to Participate  Level of consciousness: awake and alert  Post-procedure vital signs: reviewed and stable  Pain management: adequate  Airway patency: patent      Anesthetic complications: no      Cardiovascular status: blood pressure returned to baseline  Respiratory status: unassisted  Hydration status: euvolemic  Follow-up not needed.              Vitals Value Taken Time   /78 12/02/24 1022        Pulse 64 12/02/24 1022   Resp 16 12/02/24 0900   SpO2 96 % 12/02/24 1022         No case tracking events are documented in the log.      Pain/Maddy Score: No data recorded

## 2024-12-04 NOTE — DISCHARGE SUMMARY
[facility] - Periop Services  Cardiothoracic Surgery  Discharge Summary      Patient Name: Valarie Rodriguez  MRN: [id number]  Admission Date: 12/2/2024  Hospital Length of Stay: 0 days  Discharge Date and Time: 12/2/2024 10:45 AM  Attending Physician: No att. providers found   Discharging Provider: Tereso Garg PA-C  Primary Care Provider: Sylvia Antunez NP-C    HPI:   No notes on file    Procedure(s) (LRB):  [id number] (N/A)      Indwelling Lines/Drains at time of discharge:   Lines/Drains/Airways       Central Venous Catheter Line  Duration             Port A Cath Single Lumen 12/02/24 0736  2 days                  Hospital Course: No notes on file    Goals of Care Treatment Preferences:             Significant Diagnostic Studies: Radiology: X-Ray: CXR: X-Ray Chest 1 View (CXR):   Results for orders placed or performed during the hospital encounter of 12/02/24   X-Ray Chest AP Single View    Narrative    EXAMINATION:  XR CHEST 1 VIEW    CLINICAL HISTORY:  po;    TECHNIQUE:  Single view of the chest    COMPARISON:  08/29/2024    FINDINGS:  No focal opacification, pleural effusion, or pneumothorax.    The cardiomediastinal silhouette is within normal limits.    Left-sided MediPort projects over the right atrium as desired.    No acute osseous abnormality.      Impression    No acute cardiopulmonary process.      Electronically signed by: Joshua Denney  Date:    12/02/2024  Time:    09:22       Pending Diagnostic Studies:       None            No new Assessment & Plan notes have been filed under this hospital service since the last note was generated.  Service: Cardiovascular Surgery    Final Active Diagnoses:    Diagnosis Date Noted POA    PRINCIPAL PROBLEM:  Lymphadenopathy [R59.1] 08/29/2024 Yes      Problems Resolved During this Admission:      Discharged Condition: good    Disposition: Home or Self Care    Follow Up:   Follow-up Information       Melony Collier MD Follow up.     Specialties: Cardiothoracic Surgery, Cardiology  Contact information:  [address] [place] [address] Dr  [address]  [place] [address]  [phone]                           Patient Instructions:   No discharge procedures on file.  Medications:  Reconciled Home Medications:      Medication List        START taking these medications      HYDROcodone-acetaminophen 5-325 mg per tablet  Commonly known as: NORCO  Take 1 tablet by mouth every 6 (six) hours as needed for Pain.            CONTINUE taking these medications      buPROPion 300 MG 24 hr tablet  Commonly known as: WELLBUTRIN XL  Take 300 mg by mouth every morning.     cholecalciferol (vitamin D3) 1,250 mcg (50,000 unit) capsule  Take 50,000 Units by mouth every 7 days.     iron-vitamin C 100-250 mg (ICAR-C) 100-250 mg Tab  Take 1 tablet by mouth every other day.     levothyroxine 88 MCG tablet  Commonly known as: SYNTHROID  Take 88 mcg by mouth once daily.     LORazepam 1 MG tablet  Commonly known as: ATIVAN  Take 1 mg by mouth every 12 (twelve) hours as needed for Anxiety.     meloxicam 15 MG tablet  Commonly known as: MOBIC  Take 15 mg by mouth daily as needed for Pain.            STOP taking these medications      dicyclomine 20 mg tablet  Commonly known as: BENTYL     gabapentin 300 MG capsule  Commonly known as: NEURONTIN     traZODone 50 MG tablet  Commonly known as: DESYREL            Time spent on the discharge of patient: 30 minutes    Tereso Garg PA-C  Cardiothoracic Surgery  [facility] - [facility]

## 2024-12-05 ENCOUNTER — CLINICAL SUPPORT (OUTPATIENT)
Dept: RADIATION THERAPY | Facility: HOSPITAL | Age: 64
End: 2024-12-05
Attending: RADIOLOGY
Payer: MEDICARE

## 2024-12-05 PROCEDURE — 77334 RADIATION TREATMENT AID(S): CPT | Performed by: RADIOLOGY

## 2024-12-09 PROCEDURE — 77300 RADIATION THERAPY DOSE PLAN: CPT | Performed by: RADIOLOGY

## 2024-12-09 PROCEDURE — 77301 RADIOTHERAPY DOSE PLAN IMRT: CPT | Performed by: RADIOLOGY

## 2024-12-09 PROCEDURE — 77285 THER RAD SIMULAJ FIELD INTRM: CPT | Performed by: RADIOLOGY

## 2024-12-09 PROCEDURE — 77338 DESIGN MLC DEVICE FOR IMRT: CPT | Performed by: RADIOLOGY

## 2024-12-09 RX ORDER — DIPHENHYDRAMINE HYDROCHLORIDE 50 MG/ML
25 INJECTION INTRAMUSCULAR; INTRAVENOUS
Status: CANCELLED
Start: 2024-12-10

## 2024-12-09 RX ORDER — DIPHENHYDRAMINE HYDROCHLORIDE 50 MG/ML
50 INJECTION INTRAMUSCULAR; INTRAVENOUS ONCE AS NEEDED
Status: CANCELLED | OUTPATIENT
Start: 2024-12-10

## 2024-12-09 RX ORDER — SODIUM CHLORIDE 0.9 % (FLUSH) 0.9 %
10 SYRINGE (ML) INJECTION
Status: CANCELLED | OUTPATIENT
Start: 2024-12-10

## 2024-12-09 RX ORDER — FAMOTIDINE 10 MG/ML
20 INJECTION INTRAVENOUS
Status: CANCELLED | OUTPATIENT
Start: 2024-12-10

## 2024-12-09 RX ORDER — HEPARIN 100 UNIT/ML
500 SYRINGE INTRAVENOUS
Status: CANCELLED | OUTPATIENT
Start: 2024-12-10

## 2024-12-09 RX ORDER — EPINEPHRINE 0.3 MG/.3ML
0.3 INJECTION SUBCUTANEOUS ONCE AS NEEDED
Status: CANCELLED | OUTPATIENT
Start: 2024-12-10

## 2024-12-10 ENCOUNTER — INFUSION (OUTPATIENT)
Dept: INFUSION THERAPY | Facility: HOSPITAL | Age: 64
End: 2024-12-10
Attending: INTERNAL MEDICINE
Payer: MEDICARE

## 2024-12-10 VITALS
TEMPERATURE: 98 F | OXYGEN SATURATION: 100 % | RESPIRATION RATE: 20 BRPM | DIASTOLIC BLOOD PRESSURE: 70 MMHG | SYSTOLIC BLOOD PRESSURE: 143 MMHG | HEART RATE: 78 BPM

## 2024-12-10 DIAGNOSIS — C34.11 MALIGNANT NEOPLASM OF UPPER LOBE OF RIGHT LUNG: Primary | ICD-10-CM

## 2024-12-10 PROBLEM — C78.00 CANCER, METASTATIC TO LUNG: Status: RESOLVED | Noted: 2024-09-24 | Resolved: 2024-12-10

## 2024-12-10 PROCEDURE — 25000003 PHARM REV CODE 250: Performed by: INTERNAL MEDICINE

## 2024-12-10 PROCEDURE — 63600175 PHARM REV CODE 636 W HCPCS: Performed by: INTERNAL MEDICINE

## 2024-12-10 PROCEDURE — 77386 HC IMRT, COMPLEX: CPT | Performed by: RADIOLOGY

## 2024-12-10 PROCEDURE — 96417 CHEMO IV INFUS EACH ADDL SEQ: CPT

## 2024-12-10 PROCEDURE — 96413 CHEMO IV INFUSION 1 HR: CPT

## 2024-12-10 PROCEDURE — 96367 TX/PROPH/DG ADDL SEQ IV INF: CPT

## 2024-12-10 PROCEDURE — 96375 TX/PRO/DX INJ NEW DRUG ADDON: CPT

## 2024-12-10 RX ORDER — DIPHENHYDRAMINE HYDROCHLORIDE 50 MG/ML
50 INJECTION INTRAMUSCULAR; INTRAVENOUS ONCE AS NEEDED
Status: DISCONTINUED | OUTPATIENT
Start: 2024-12-10 | End: 2024-12-10 | Stop reason: HOSPADM

## 2024-12-10 RX ORDER — HEPARIN 100 UNIT/ML
500 SYRINGE INTRAVENOUS
Status: DISCONTINUED | OUTPATIENT
Start: 2024-12-10 | End: 2024-12-10 | Stop reason: HOSPADM

## 2024-12-10 RX ORDER — EPINEPHRINE 0.3 MG/.3ML
0.3 INJECTION SUBCUTANEOUS ONCE AS NEEDED
Status: DISCONTINUED | OUTPATIENT
Start: 2024-12-10 | End: 2024-12-10 | Stop reason: HOSPADM

## 2024-12-10 RX ORDER — SODIUM CHLORIDE 0.9 % (FLUSH) 0.9 %
10 SYRINGE (ML) INJECTION
Status: DISCONTINUED | OUTPATIENT
Start: 2024-12-10 | End: 2024-12-10 | Stop reason: HOSPADM

## 2024-12-10 RX ORDER — FAMOTIDINE 10 MG/ML
20 INJECTION INTRAVENOUS
Status: COMPLETED | OUTPATIENT
Start: 2024-12-10 | End: 2024-12-10

## 2024-12-10 RX ORDER — DIPHENHYDRAMINE HYDROCHLORIDE 50 MG/ML
25 INJECTION INTRAMUSCULAR; INTRAVENOUS
Status: COMPLETED | OUTPATIENT
Start: 2024-12-10 | End: 2024-12-10

## 2024-12-10 RX ADMIN — PACLITAXEL 78 MG: 6 INJECTION, SOLUTION INTRAVENOUS at 11:12

## 2024-12-10 RX ADMIN — FAMOTIDINE 20 MG: 10 INJECTION, SOLUTION INTRAVENOUS at 10:12

## 2024-12-10 RX ADMIN — DEXAMETHASONE SODIUM PHOSPHATE 0.25 MG: 4 INJECTION, SOLUTION INTRA-ARTICULAR; INTRALESIONAL; INTRAMUSCULAR; INTRAVENOUS; SOFT TISSUE at 10:12

## 2024-12-10 RX ADMIN — SODIUM CHLORIDE: 9 INJECTION, SOLUTION INTRAVENOUS at 11:12

## 2024-12-10 RX ADMIN — HEPARIN 500 UNITS: 100 SYRINGE at 01:12

## 2024-12-10 RX ADMIN — CARBOPLATIN 160 MG: 10 INJECTION, SOLUTION INTRAVENOUS at 12:12

## 2024-12-10 RX ADMIN — DIPHENHYDRAMINE HYDROCHLORIDE 25 MG: 50 INJECTION INTRAMUSCULAR; INTRAVENOUS at 10:12

## 2024-12-10 NOTE — PROGRESS NOTES
Subjective:       Patient ID: Valarie Rodriguez is a 64 y.o. female.    Pulmonologist: Dr. Zach Martinez    NSCLC Stage IIIA ([id number])--Diagnosed 24  Biopsy/pathology: Cervical mediastinoscopy done 24--multiple biopsies taken of 2R (upper paratracheal) LN and pathology showed poorly differentiated carcinoma, morphologically c/w NSCLC, c/w lung primary site, PD-L1 TPS 80%, CPS 85%, KRAS G12C mutation (approved therapies Adagrasib or Sotorasib), TMB 1.6 (low), MSI cannot be assessed.  Imagin. CT chest w/o contrast 23--7mm RUL spiculated nodule again seen and similar in appearance to comparison, suspicious for neoplasia, mediastinal lymph nodes similar in comparison, emphysema is noted.  2. CT chest w/o contrast done 2/15/24--stable RUL spiculated nodule, development of right hilar adenopathy, enlarged precarinal LN, increased in size compared to previous exam, PET/CT should be considered, emphysema.   3. CT chest w/o contrast 24--stable RUL spiculated nodule measures 7mm, stable small ground glass densities, enlarged precarinal LN measures 2.7X1.9cm, increased in size, right hilar LN vs. Mass measures 2.3X1.6cm, stable, PET should be considered, emphysema.   4. PET/CT done 24--hypermetabolic enlarged right paratracheal LN and prominent right hilum with slightly elevated activity c/w neoplasm, elevated activity of anterior thyroid bed and subcutaneous tissues, likely post-surgical, 7mm spiculated RUL nodule with adjacent vague opacity unchanged, and demonstrates no activity, bilateral L>R trochanteric bursitis, calcified uterine fibroid.   5. MRI brain w/ and w/o contrast done 24--no metastatic disease, mucous retention cyst vs, polyp in right maxillary antrum.  6. CT C/A/P 24--Upper normal to slightly enlarged single pretracheal mediastinal lymph node, nonspecific, bilaterally symmetric apical pleural based fibronodular scarring.  A somewhat irregular focal/nodular opacity in  the right apex noted measuring 8-9 mm, nonspecific, stable from the prior. Otherwise no evidence of malignancy.    Procedures:   Left subclavian mediport placed 12/2/24.    Current treatment plan:   Weekly Carboplatin/Paclitaxel + RT started 12/10/24 (patient chose to delay treatment).  Durvalumab maintenance    Chief Complaint: 2 wk f/u    HPI  Patient presents for follow-up of NSCLC. She started treatment last week concurrent RT and weekly Carbo/Taxol and tolerated very well. Reports no side effects aside from fatigue, happened on Saturday. No N/V or other problems.     Past Medical History:   Diagnosis Date    Arthritis     Hyperlipidemia     Lymphadenopathy     Malignant neoplasm of upper lobe of right lung 09/13/2024    Pulmonary nodule     Thyroid disease       Past Surgical History:   Procedure Laterality Date    CHOLECYSTECTOMY      COLONOSCOPY      INSERTION OF TUNNELED CENTRAL VENOUS CATHETER (CVC) WITH SUBCUTANEOUS PORT N/A 12/2/2024    Procedure: [id number];  Surgeon: Melony Collier MD;  Location: [facility] OR;  Service: Cardiothoracic;  Laterality: N/A;  MEDIPORT FOR CHEMO    MEDIASTINOSCOPY N/A 8/29/2024    Procedure: MEDIASTINOSCOPY;  Surgeon: Melony Collier MD;  Location: [facility] OR;  Service: Cardiothoracic;  Laterality: N/A;    THYROIDECTOMY, PARTIAL      TUBAL LIGATION       No family history on file.  Social History     Socioeconomic History    Marital status: Single    Years of education: 11   Tobacco Use    Smoking status: Every Day     Current packs/day: 0.10     Average packs/day: 0.5 packs/day for 35.1 years (17.5 ttl pk-yrs)     Types: Cigarettes     Start date: 11/19/2024     Last attempt to quit: 11/2024   Substance and Sexual Activity    Alcohol use: No     Alcohol/week: 0.0 standard drinks of alcohol    Drug use: No    Sexual activity: Yes     Partners: Male     Social Drivers of Health     Financial Resource Strain: Low Risk  (11/13/2024)    Overall Financial Resource Strain  (CARDIA)     Difficulty of Paying Living Expenses: Not hard at all   Food Insecurity: No Food Insecurity (11/13/2024)    Hunger Vital Sign     Worried About Running Out of Food in the Last Year: Never true     Ran Out of Food in the Last Year: Never true   Physical Activity: Inactive (11/13/2024)    Exercise Vital Sign     Days of Exercise per Week: 0 days     Minutes of Exercise per Session: 0 min   Stress: Stress Concern Present (11/13/2024)    [employer] [place] [employer] - Occupational Stress Questionnaire     Feeling of Stress : To some extent   Housing Stability: Unknown (11/13/2024)    Housing Stability Vital Sign     Unable to Pay for Housing in the Last Year: No       Review of patient's allergies indicates:  No Known Allergies   Current Outpatient Medications on File Prior to Visit   Medication Sig Dispense Refill    buPROPion (WELLBUTRIN XL) 300 MG 24 hr tablet Take 300 mg by mouth every morning.      cholecalciferol, vitamin D3, 1,250 mcg (50,000 unit) capsule Take 50,000 Units by mouth every 7 days.      HYDROcodone-acetaminophen (NORCO) 5-325 mg per tablet Take 1 tablet by mouth every 6 (six) hours as needed for Pain. 20 tablet 0    iron-vitamin C 100-250 mg, ICAR-C, 100-250 mg Tab Take 1 tablet by mouth every other day.      levothyroxine (SYNTHROID) 88 MCG tablet Take 88 mcg by mouth once daily.      meloxicam (MOBIC) 15 MG tablet Take 15 mg by mouth daily as needed for Pain.      ondansetron (ZOFRAN) 8 MG tablet Take 1 tablet (8 mg total) by mouth every 8 (eight) hours as needed for Nausea. 20 tablet 1    prochlorperazine (COMPAZINE) 5 MG tablet Take 1 tablet (5 mg total) by mouth every 6 (six) hours as needed for Nausea. 20 tablet 1    LORazepam (ATIVAN) 1 MG tablet Take 1 mg by mouth every 12 (twelve) hours as needed for Anxiety. (Patient not taking: Reported on 12/3/2024)       No current facility-administered medications on file prior to visit.      Review of Systems    Constitutional:  Negative for appetite change and unexpected weight change.   HENT:  Negative for mouth sores.    Eyes:  Negative for visual disturbance.   Respiratory:  Negative for cough and shortness of breath.    Cardiovascular:  Negative for chest pain.   Gastrointestinal:  Negative for abdominal pain and diarrhea.   Genitourinary:  Negative for frequency.   Musculoskeletal:  Negative for back pain.   Integumentary:  Negative for rash.   Neurological:  Negative for headaches.   Hematological:  Negative for adenopathy.   Psychiatric/Behavioral:  The patient is not nervous/anxious.             Vitals:    12/16/24 1101   BP: 122/73   Pulse: 79   Resp: 18   Temp: 97.6 °F (36.4 °C)   TempSrc: Oral   SpO2: 100%   Weight: 66.1 kg (145 lb 11.2 oz)      Physical Exam  Constitutional:       Appearance: Normal appearance.   HENT:      Head: Normocephalic.      Nose: Nose normal.      Mouth/Throat:      Mouth: Mucous membranes are moist.   Eyes:      Extraocular Movements: Extraocular movements intact.      Conjunctiva/sclera: Conjunctivae normal.   Cardiovascular:      Rate and Rhythm: Normal rate and regular rhythm.   Pulmonary:      Effort: Pulmonary effort is normal.      Breath sounds: Normal breath sounds.   Chest:      Comments: Anterior superior chest with incision from mediastinoscopy healed well; left chest wall mediport in place healed well  Abdominal:      General: Bowel sounds are normal. There is no distension.      Palpations: Abdomen is soft.      Tenderness: There is no abdominal tenderness.   Musculoskeletal:         General: Normal range of motion.   Skin:     General: Skin is warm.   Neurological:      General: No focal deficit present.      Mental Status: She is alert and oriented to person, place, and time.   Psychiatric:         Mood and Affect: Mood normal.         Judgment: Judgment normal.       Lab Visit on 12/16/2024   Component Date Value Ref Range Status    WBC 12/16/2024 6.69  4.50 - 11.50  x10(3)/mcL Final    RBC 12/16/2024 4.21  4.20 - 5.40 x10(6)/mcL Final    Hgb 12/16/2024 13.4  12.0 - 16.0 g/dL Final    Hct 12/16/2024 41.1  37.0 - 47.0 % Final    MCV 12/16/2024 97.6 (H)  80.0 - 94.0 fL Final    MCH 12/16/2024 31.8 (H)  27.0 - 31.0 pg Final    MCHC 12/16/2024 32.6 (L)  33.0 - 36.0 g/dL Final    RDW 12/16/2024 12.5  11.5 - 17.0 % Final    Platelet 12/16/2024 262  130 - 400 x10(3)/mcL Final    MPV 12/16/2024 10.5 (H)  7.4 - 10.4 fL Final    Neut % 12/16/2024 74.3  % Final    Lymph % 12/16/2024 19.7  % Final    Mono % 12/16/2024 5.2  % Final    Eos % 12/16/2024 0.1  % Final    Basophil % 12/16/2024 0.4  % Final    Lymph # 12/16/2024 1.32  0.6 - 4.6 x10(3)/mcL Final    Neut # 12/16/2024 4.96  2.1 - 9.2 x10(3)/mcL Final    Mono # 12/16/2024 0.35  0.1 - 1.3 x10(3)/mcL Final    Eos # 12/16/2024 0.01  0 - 0.9 x10(3)/mcL Final    Baso # 12/16/2024 0.03  <=0.2 x10(3)/mcL Final    IG# 12/16/2024 0.02  0 - 0.04 x10(3)/mcL Final    IG% 12/16/2024 0.3  % Final   Lab Visit on 12/03/2024   Component Date Value Ref Range Status    Sodium 12/03/2024 142  136 - 145 mmol/L Final    Potassium 12/03/2024 3.7  3.5 - 5.1 mmol/L Final    Chloride 12/03/2024 108 (H)  98 - 107 mmol/L Final    CO2 12/03/2024 25  23 - 31 mmol/L Final    Glucose 12/03/2024 104  82 - 115 mg/dL Final    Blood Urea Nitrogen 12/03/2024 19.8  9.8 - 20.1 mg/dL Final    Creatinine 12/03/2024 1.08 (H)  0.55 - 1.02 mg/dL Final    Calcium 12/03/2024 9.2  8.4 - 10.2 mg/dL Final    Protein Total 12/03/2024 7.3  5.8 - 7.6 gm/dL Final    Albumin 12/03/2024 3.7  3.4 - 4.8 g/dL Final    Globulin 12/03/2024 3.6 (H)  2.4 - 3.5 gm/dL Final    Albumin/Globulin Ratio 12/03/2024 1.0 (L)  1.1 - 2.0 ratio Final    Bilirubin Total 12/03/2024 0.2  <=1.5 mg/dL Final    ALP 12/03/2024 73  40 - 150 unit/L Final    ALT 12/03/2024 6  0 - 55 unit/L Final    AST 12/03/2024 12  5 - 34 unit/L Final    eGFR 12/03/2024 57  mL/min/1.73/m2 Final    Anion Gap 12/03/2024 9.0  mEq/L  Final    BUN/Creatinine Ratio 12/03/2024 18   Final    WBC 12/03/2024 12.29 (H)  4.50 - 11.50 x10(3)/mcL Final    RBC 12/03/2024 3.95 (L)  4.20 - 5.40 x10(6)/mcL Final    Hgb 12/03/2024 12.6  12.0 - 16.0 g/dL Final    Hct 12/03/2024 38.7  37.0 - 47.0 % Final    MCV 12/03/2024 98.0 (H)  80.0 - 94.0 fL Final    MCH 12/03/2024 31.9 (H)  27.0 - 31.0 pg Final    MCHC 12/03/2024 32.6 (L)  33.0 - 36.0 g/dL Final    RDW 12/03/2024 12.9  11.5 - 17.0 % Final    Platelet 12/03/2024 250  130 - 400 x10(3)/mcL Final    MPV 12/03/2024 10.6 (H)  7.4 - 10.4 fL Final    Neut % 12/03/2024 74.6  % Final    Lymph % 12/03/2024 19.3  % Final    Mono % 12/03/2024 5.7  % Final    Eos % 12/03/2024 0.1  % Final    Basophil % 12/03/2024 0.2  % Final    Lymph # 12/03/2024 2.37  0.6 - 4.6 x10(3)/mcL Final    Neut # 12/03/2024 9.18  2.1 - 9.2 x10(3)/mcL Final    Mono # 12/03/2024 0.70  0.1 - 1.3 x10(3)/mcL Final    Eos # 12/03/2024 0.01  0 - 0.9 x10(3)/mcL Final    Baso # 12/03/2024 0.02  <=0.2 x10(3)/mcL Final    IG# 12/03/2024 0.01  0 - 0.04 x10(3)/mcL Final    IG% 12/03/2024 0.1  % Final   Lab Visit on 11/27/2024   Component Date Value Ref Range Status    WBC 11/27/2024 7.33  4.50 - 11.50 x10(3)/mcL Final    RBC 11/27/2024 4.26  4.20 - 5.40 x10(6)/mcL Final    Hgb 11/27/2024 13.4  12.0 - 16.0 g/dL Final    Hct 11/27/2024 42.6  37.0 - 47.0 % Final    MCV 11/27/2024 100.0 (H)  80.0 - 94.0 fL Final    MCH 11/27/2024 31.5 (H)  27.0 - 31.0 pg Final    MCHC 11/27/2024 31.5 (L)  33.0 - 36.0 g/dL Final    RDW 11/27/2024 13.3  11.5 - 17.0 % Final    Platelet 11/27/2024 184  130 - 400 x10(3)/mcL Final    MPV 11/27/2024 11.0 (H)  7.4 - 10.4 fL Final    Neut % 11/27/2024 67.8  % Final    Lymph % 11/27/2024 26.1  % Final    Mono % 11/27/2024 5.2  % Final    Eos % 11/27/2024 0.4  % Final    Basophil % 11/27/2024 0.4  % Final    Lymph # 11/27/2024 1.91  0.6 - 4.6 x10(3)/mcL Final    Neut # 11/27/2024 4.97  2.1 - 9.2 x10(3)/mcL Final    Mono # 11/27/2024 0.38   0.1 - 1.3 x10(3)/mcL Final    Eos # 11/27/2024 0.03  0 - 0.9 x10(3)/mcL Final    Baso # 11/27/2024 0.03  <=0.2 x10(3)/mcL Final    IG# 11/27/2024 0.01  0 - 0.04 x10(3)/mcL Final    IG% 11/27/2024 0.1  % Final         Assessment:       1. Malignant neoplasm of upper lobe of right lung    2. Secondary malignant neoplasm of mediastinum        Plan:       Patient with Stage IIIA Lung cancer, NSCLC TTF-1+, poorly differentiated, not specified if adenocarcinoma or squamous cell carcinoma, although did confirm with pathology this is NSCLC. [employer] testing with PD-L1 85% and KRAS G12C mutation, diagnosed 8/29/24.   Primary tumor is either occult or may be the 7mm spiculated RUL mass. This was biopsied previously via bronchoscopy and reportedly negative. Will obtain records.  PET shows hypermetabolic right hilar LN and paratracheal LN, and the 7mm RUL lung nodule is not hypermetabolic, though too small to be evaluated by PET.  Brain MRI with no metastatic disease.   Case discussed with Dr. Kay.     Per NCCN guidelines, treatment recommended with definitive concurrent chemoradiation (Category 1) followed by Durvalumab maintenance.   Patient delayed her treatment for family trip and had 2nd opinion in [place] and agreed with plan.    Treatment started with concurrent RT and weekly Carboplatin/Paclitaxel on 12/10/24.   She tolerated first week of chemotherapy well. Only has some fatigue.     Labs today with normal CBCdiff, will f/u CMP.   Proceed with week #2 tomorrow.  Schedule labs and treatment next week #3.  F/u T/D visit in 2 weeks prior to week #4.     Once she has completed chemo/RT, plan for repeat CT chest 4 weeks after and begin Durvalumab maintenance.        All questions answered at this time.     Smoking cessation strongly encouraged and she has quit!    Jeannie Clark MD    Treatment Plan Information   OP NSCLC PACLITAXEL + CARBOPLATIN (AUC) QW + RADIATION Jeannie Clark MD   Associated diagnosis:  Malignant neoplasm of upper lobe of right lung Stage IIIA cT1a, cN2, cM0 noted on 9/13/2024   Line of treatment: First Line  Treatment Goal: Curative     Upcoming Treatment Dates - OP NSCLC PACLITAXEL + CARBOPLATIN (AUC) QW + RADIATION    12/17/2024       Pre-Medications       famotidine (PF) injection 20 mg       diphenhydrAMINE injection 25 mg       Chemotherapy       PACLitaxeL (TAXOL) 45 mg/m2 = 78 mg in 0.9% NaCl 250 mL chemo infusion       CARBOplatin (PARAPLATIN) in 0.9% NaCl 250 mL chemo infusion       Antiemetics       palonosetron 0.25mg/dexAMETHasone 12mg in NS IVPB 0.25 mg 50 mL  12/24/2024       Pre-Medications       famotidine (PF) injection 20 mg       diphenhydrAMINE injection 25 mg       Chemotherapy       PACLitaxeL (TAXOL) 45 mg/m2 = 78 mg in 0.9% NaCl 250 mL chemo infusion       CARBOplatin (PARAPLATIN) in 0.9% NaCl 250 mL chemo infusion       Antiemetics       palonosetron 0.25mg/dexAMETHasone 12mg in NS IVPB 0.25 mg 50 mL  12/31/2024       Pre-Medications       famotidine (PF) injection 20 mg       diphenhydrAMINE injection 25 mg       Chemotherapy       PACLitaxeL (TAXOL) 45 mg/m2 = 78 mg in 0.9% NaCl 250 mL chemo infusion       CARBOplatin (PARAPLATIN) in 0.9% NaCl 250 mL chemo infusion       Antiemetics       palonosetron 0.25mg/dexAMETHasone 12mg in NS IVPB 0.25 mg 50 mL  1/7/2025       Pre-Medications       famotidine (PF) injection 20 mg       diphenhydrAMINE injection 25 mg       Chemotherapy       PACLitaxeL (TAXOL) 45 mg/m2 = 78 mg in 0.9% NaCl 250 mL chemo infusion       CARBOplatin (PARAPLATIN) in 0.9% NaCl 250 mL chemo infusion       Antiemetics       palonosetron 0.25mg/dexAMETHasone 12mg in NS IVPB 0.25 mg 50 mL

## 2024-12-10 NOTE — PLAN OF CARE
Problem: Adult Inpatient Plan of Care  Goal: Plan of Care Review  Outcome: Met  Flowsheets (Taken 12/10/2024 1303)  Plan of Care Reviewed With: patient  Goal: Absence of Hospital-Acquired Illness or Injury  Outcome: Met  Intervention: Identify and Manage Fall Risk  Flowsheets (Taken 12/10/2024 1303)  Safety Promotion/Fall Prevention:   assistive device/personal item within reach   in recliner, wheels locked   Fall Risk reviewed with patient/family     Next appt reviewed; pt denied questions or further needs at the time of discharge.

## 2024-12-11 PROCEDURE — 77386 HC IMRT, COMPLEX: CPT | Performed by: RADIOLOGY

## 2024-12-12 PROCEDURE — 77386 HC IMRT, COMPLEX: CPT | Performed by: RADIOLOGY

## 2024-12-13 PROCEDURE — 77386 HC IMRT, COMPLEX: CPT

## 2024-12-16 ENCOUNTER — OFFICE VISIT (OUTPATIENT)
Dept: HEMATOLOGY/ONCOLOGY | Facility: CLINIC | Age: 64
End: 2024-12-16
Payer: MEDICARE

## 2024-12-16 ENCOUNTER — LAB VISIT (OUTPATIENT)
Dept: LAB | Facility: HOSPITAL | Age: 64
End: 2024-12-16
Attending: INTERNAL MEDICINE
Payer: MEDICARE

## 2024-12-16 VITALS
HEART RATE: 79 BPM | OXYGEN SATURATION: 100 % | BODY MASS INDEX: 26.65 KG/M2 | TEMPERATURE: 98 F | RESPIRATION RATE: 18 BRPM | SYSTOLIC BLOOD PRESSURE: 122 MMHG | DIASTOLIC BLOOD PRESSURE: 73 MMHG | WEIGHT: 145.69 LBS

## 2024-12-16 DIAGNOSIS — C78.1 SECONDARY MALIGNANT NEOPLASM OF MEDIASTINUM: ICD-10-CM

## 2024-12-16 DIAGNOSIS — C34.11 MALIGNANT NEOPLASM OF UPPER LOBE OF RIGHT LUNG: Primary | ICD-10-CM

## 2024-12-16 DIAGNOSIS — C34.11 MALIGNANT NEOPLASM OF UPPER LOBE OF RIGHT LUNG: ICD-10-CM

## 2024-12-16 LAB
ALBUMIN SERPL-MCNC: 3.5 G/DL (ref 3.4–4.8)
ALBUMIN/GLOB SERPL: 1 RATIO (ref 1.1–2)
ALP SERPL-CCNC: 69 UNIT/L (ref 40–150)
ALT SERPL-CCNC: 9 UNIT/L (ref 0–55)
ANION GAP SERPL CALC-SCNC: 6 MEQ/L
AST SERPL-CCNC: 12 UNIT/L (ref 5–34)
BASOPHILS # BLD AUTO: 0.03 X10(3)/MCL
BASOPHILS NFR BLD AUTO: 0.4 %
BILIRUB SERPL-MCNC: 0.6 MG/DL
BUN SERPL-MCNC: 19.1 MG/DL (ref 9.8–20.1)
CALCIUM SERPL-MCNC: 9.3 MG/DL (ref 8.4–10.2)
CHLORIDE SERPL-SCNC: 105 MMOL/L (ref 98–107)
CO2 SERPL-SCNC: 29 MMOL/L (ref 23–31)
CREAT SERPL-MCNC: 0.85 MG/DL (ref 0.55–1.02)
CREAT/UREA NIT SERPL: 22
EOSINOPHIL # BLD AUTO: 0.01 X10(3)/MCL (ref 0–0.9)
EOSINOPHIL NFR BLD AUTO: 0.1 %
ERYTHROCYTE [DISTWIDTH] IN BLOOD BY AUTOMATED COUNT: 12.5 % (ref 11.5–17)
GFR SERPLBLD CREATININE-BSD FMLA CKD-EPI: >60 ML/MIN/1.73/M2
GLOBULIN SER-MCNC: 3.4 GM/DL (ref 2.4–3.5)
GLUCOSE SERPL-MCNC: 91 MG/DL (ref 82–115)
HCT VFR BLD AUTO: 41.1 % (ref 37–47)
HGB BLD-MCNC: 13.4 G/DL (ref 12–16)
IMM GRANULOCYTES # BLD AUTO: 0.02 X10(3)/MCL (ref 0–0.04)
IMM GRANULOCYTES NFR BLD AUTO: 0.3 %
LYMPHOCYTES # BLD AUTO: 1.32 X10(3)/MCL (ref 0.6–4.6)
LYMPHOCYTES NFR BLD AUTO: 19.7 %
MAGNESIUM SERPL-MCNC: 2.2 MG/DL (ref 1.6–2.6)
MCH RBC QN AUTO: 31.8 PG (ref 27–31)
MCHC RBC AUTO-ENTMCNC: 32.6 G/DL (ref 33–36)
MCV RBC AUTO: 97.6 FL (ref 80–94)
MONOCYTES # BLD AUTO: 0.35 X10(3)/MCL (ref 0.1–1.3)
MONOCYTES NFR BLD AUTO: 5.2 %
NEUTROPHILS # BLD AUTO: 4.96 X10(3)/MCL (ref 2.1–9.2)
NEUTROPHILS NFR BLD AUTO: 74.3 %
PLATELET # BLD AUTO: 262 X10(3)/MCL (ref 130–400)
PMV BLD AUTO: 10.5 FL (ref 7.4–10.4)
POTASSIUM SERPL-SCNC: 4.5 MMOL/L (ref 3.5–5.1)
PROT SERPL-MCNC: 6.9 GM/DL (ref 5.8–7.6)
RBC # BLD AUTO: 4.21 X10(6)/MCL (ref 4.2–5.4)
SODIUM SERPL-SCNC: 140 MMOL/L (ref 136–145)
WBC # BLD AUTO: 6.69 X10(3)/MCL (ref 4.5–11.5)

## 2024-12-16 PROCEDURE — 1126F AMNT PAIN NOTED NONE PRSNT: CPT | Mod: CPTII,S$GLB,, | Performed by: INTERNAL MEDICINE

## 2024-12-16 PROCEDURE — 3288F FALL RISK ASSESSMENT DOCD: CPT | Mod: CPTII,S$GLB,, | Performed by: INTERNAL MEDICINE

## 2024-12-16 PROCEDURE — 1101F PT FALLS ASSESS-DOCD LE1/YR: CPT | Mod: CPTII,S$GLB,, | Performed by: INTERNAL MEDICINE

## 2024-12-16 PROCEDURE — 1159F MED LIST DOCD IN RCRD: CPT | Mod: CPTII,S$GLB,, | Performed by: INTERNAL MEDICINE

## 2024-12-16 PROCEDURE — 99215 OFFICE O/P EST HI 40 MIN: CPT | Mod: S$GLB,,, | Performed by: INTERNAL MEDICINE

## 2024-12-16 PROCEDURE — 85025 COMPLETE CBC W/AUTO DIFF WBC: CPT

## 2024-12-16 PROCEDURE — 77336 RADIATION PHYSICS CONSULT: CPT | Performed by: RADIOLOGY

## 2024-12-16 PROCEDURE — 99999 PR PBB SHADOW E&M-EST. PATIENT-LVL IV: CPT | Mod: PBBFAC,,, | Performed by: INTERNAL MEDICINE

## 2024-12-16 PROCEDURE — 80053 COMPREHEN METABOLIC PANEL: CPT

## 2024-12-16 PROCEDURE — 3074F SYST BP LT 130 MM HG: CPT | Mod: CPTII,S$GLB,, | Performed by: INTERNAL MEDICINE

## 2024-12-16 PROCEDURE — 3078F DIAST BP <80 MM HG: CPT | Mod: CPTII,S$GLB,, | Performed by: INTERNAL MEDICINE

## 2024-12-16 PROCEDURE — 77386 HC IMRT, COMPLEX: CPT | Performed by: RADIOLOGY

## 2024-12-16 PROCEDURE — 1160F RVW MEDS BY RX/DR IN RCRD: CPT | Mod: CPTII,S$GLB,, | Performed by: INTERNAL MEDICINE

## 2024-12-16 PROCEDURE — 3008F BODY MASS INDEX DOCD: CPT | Mod: CPTII,S$GLB,, | Performed by: INTERNAL MEDICINE

## 2024-12-16 PROCEDURE — 83735 ASSAY OF MAGNESIUM: CPT

## 2024-12-16 PROCEDURE — 36415 COLL VENOUS BLD VENIPUNCTURE: CPT

## 2024-12-17 ENCOUNTER — INFUSION (OUTPATIENT)
Dept: INFUSION THERAPY | Facility: HOSPITAL | Age: 64
End: 2024-12-17
Attending: INTERNAL MEDICINE
Payer: MEDICARE

## 2024-12-17 VITALS
HEART RATE: 75 BPM | WEIGHT: 145.69 LBS | HEIGHT: 62 IN | RESPIRATION RATE: 18 BRPM | DIASTOLIC BLOOD PRESSURE: 65 MMHG | SYSTOLIC BLOOD PRESSURE: 129 MMHG | BODY MASS INDEX: 26.81 KG/M2

## 2024-12-17 DIAGNOSIS — C34.11 MALIGNANT NEOPLASM OF UPPER LOBE OF RIGHT LUNG: Primary | ICD-10-CM

## 2024-12-17 PROCEDURE — 63600175 PHARM REV CODE 636 W HCPCS: Performed by: INTERNAL MEDICINE

## 2024-12-17 PROCEDURE — 96413 CHEMO IV INFUSION 1 HR: CPT

## 2024-12-17 PROCEDURE — 96367 TX/PROPH/DG ADDL SEQ IV INF: CPT

## 2024-12-17 PROCEDURE — 96417 CHEMO IV INFUS EACH ADDL SEQ: CPT

## 2024-12-17 PROCEDURE — 77386 HC IMRT, COMPLEX: CPT | Performed by: RADIOLOGY

## 2024-12-17 PROCEDURE — 25000003 PHARM REV CODE 250: Performed by: INTERNAL MEDICINE

## 2024-12-17 PROCEDURE — 96375 TX/PRO/DX INJ NEW DRUG ADDON: CPT

## 2024-12-17 RX ORDER — DIPHENHYDRAMINE HYDROCHLORIDE 50 MG/ML
50 INJECTION INTRAMUSCULAR; INTRAVENOUS ONCE AS NEEDED
Status: CANCELLED | OUTPATIENT
Start: 2024-12-17

## 2024-12-17 RX ORDER — SODIUM CHLORIDE 0.9 % (FLUSH) 0.9 %
10 SYRINGE (ML) INJECTION
Status: CANCELLED | OUTPATIENT
Start: 2024-12-17

## 2024-12-17 RX ORDER — FAMOTIDINE 10 MG/ML
20 INJECTION INTRAVENOUS
Status: CANCELLED | OUTPATIENT
Start: 2024-12-17

## 2024-12-17 RX ORDER — HEPARIN 100 UNIT/ML
500 SYRINGE INTRAVENOUS
Status: DISCONTINUED | OUTPATIENT
Start: 2024-12-17 | End: 2024-12-17 | Stop reason: HOSPADM

## 2024-12-17 RX ORDER — HEPARIN 100 UNIT/ML
500 SYRINGE INTRAVENOUS
Status: CANCELLED | OUTPATIENT
Start: 2024-12-17

## 2024-12-17 RX ORDER — EPINEPHRINE 0.3 MG/.3ML
0.3 INJECTION SUBCUTANEOUS ONCE AS NEEDED
Status: CANCELLED | OUTPATIENT
Start: 2024-12-17

## 2024-12-17 RX ORDER — DIPHENHYDRAMINE HYDROCHLORIDE 50 MG/ML
25 INJECTION INTRAMUSCULAR; INTRAVENOUS
Status: CANCELLED
Start: 2024-12-17

## 2024-12-17 RX ORDER — SODIUM CHLORIDE 0.9 % (FLUSH) 0.9 %
10 SYRINGE (ML) INJECTION
Status: DISCONTINUED | OUTPATIENT
Start: 2024-12-17 | End: 2024-12-17 | Stop reason: HOSPADM

## 2024-12-17 RX ORDER — DIPHENHYDRAMINE HYDROCHLORIDE 50 MG/ML
50 INJECTION INTRAMUSCULAR; INTRAVENOUS ONCE AS NEEDED
Status: DISCONTINUED | OUTPATIENT
Start: 2024-12-17 | End: 2024-12-17 | Stop reason: HOSPADM

## 2024-12-17 RX ORDER — FAMOTIDINE 10 MG/ML
20 INJECTION INTRAVENOUS
Status: COMPLETED | OUTPATIENT
Start: 2024-12-17 | End: 2024-12-17

## 2024-12-17 RX ORDER — DIPHENHYDRAMINE HYDROCHLORIDE 50 MG/ML
25 INJECTION INTRAMUSCULAR; INTRAVENOUS
Status: COMPLETED | OUTPATIENT
Start: 2024-12-17 | End: 2024-12-17

## 2024-12-17 RX ORDER — EPINEPHRINE 0.3 MG/.3ML
0.3 INJECTION SUBCUTANEOUS ONCE AS NEEDED
Status: DISCONTINUED | OUTPATIENT
Start: 2024-12-17 | End: 2024-12-17 | Stop reason: HOSPADM

## 2024-12-17 RX ADMIN — SODIUM CHLORIDE 0.25 MG: 9 INJECTION, SOLUTION INTRAVENOUS at 11:12

## 2024-12-17 RX ADMIN — HEPARIN 500 UNITS: 100 SYRINGE at 01:12

## 2024-12-17 RX ADMIN — PACLITAXEL 78 MG: 6 INJECTION, SOLUTION INTRAVENOUS at 11:12

## 2024-12-17 RX ADMIN — CARBOPLATIN 185 MG: 10 INJECTION, SOLUTION INTRAVENOUS at 01:12

## 2024-12-17 RX ADMIN — FAMOTIDINE 20 MG: 10 INJECTION, SOLUTION INTRAVENOUS at 11:12

## 2024-12-17 RX ADMIN — DIPHENHYDRAMINE HYDROCHLORIDE 25 MG: 50 INJECTION INTRAMUSCULAR; INTRAVENOUS at 11:12

## 2024-12-18 PROBLEM — F17.210 CIGARETTE SMOKER: Status: ACTIVE | Noted: 2021-12-01

## 2024-12-18 PROBLEM — E03.9 HYPOTHYROIDISM: Status: ACTIVE | Noted: 2021-12-01

## 2024-12-18 PROBLEM — Z72.0 TOBACCO USER: Status: ACTIVE | Noted: 2024-12-18

## 2024-12-18 PROBLEM — M85.80 OSTEOPENIA: Status: ACTIVE | Noted: 2021-12-01

## 2024-12-18 PROCEDURE — 77386 HC IMRT, COMPLEX: CPT | Performed by: RADIOLOGY

## 2024-12-18 NOTE — PROGRESS NOTES
Subjective:       Patient ID: Valarie Rodriguez is a 64 y.o. female.    Pulmonologist: Dr. Zach Mratinez    NSCLC Stage IIIA ([id number])--Diagnosed 24  Biopsy/pathology: Cervical mediastinoscopy done 24--multiple biopsies taken of 2R (upper paratracheal) LN and pathology showed poorly differentiated carcinoma, morphologically c/w NSCLC, c/w lung primary site, PD-L1 TPS 80%, CPS 85%, KRAS G12C mutation (approved therapies Adagrasib or Sotorasib), TMB 1.6 (low), MSI cannot be assessed.  Imagin. CT chest w/o contrast 23--7mm RUL spiculated nodule again seen and similar in appearance to comparison, suspicious for neoplasia, mediastinal lymph nodes similar in comparison, emphysema is noted.  2. CT chest w/o contrast done 2/15/24--stable RUL spiculated nodule, development of right hilar adenopathy, enlarged precarinal LN, increased in size compared to previous exam, PET/CT should be considered, emphysema.   3. CT chest w/o contrast 24--stable RUL spiculated nodule measures 7mm, stable small ground glass densities, enlarged precarinal LN measures 2.7X1.9cm, increased in size, right hilar LN vs. Mass measures 2.3X1.6cm, stable, PET should be considered, emphysema.   4. PET/CT done 24--hypermetabolic enlarged right paratracheal LN and prominent right hilum with slightly elevated activity c/w neoplasm, elevated activity of anterior thyroid bed and subcutaneous tissues, likely post-surgical, 7mm spiculated RUL nodule with adjacent vague opacity unchanged, and demonstrates no activity, bilateral L>R trochanteric bursitis, calcified uterine fibroid.   5. MRI brain w/ and w/o contrast done 24--no metastatic disease, mucous retention cyst vs, polyp in right maxillary antrum.  6. CT C/A/P 24--Upper normal to slightly enlarged single pretracheal mediastinal lymph node, nonspecific, bilaterally symmetric apical pleural based fibronodular scarring.  A somewhat irregular focal/nodular opacity in  the right apex noted measuring 8-9 mm, nonspecific, stable from the prior. Otherwise no evidence of malignancy.    Procedures:   Left subclavian mediport placed 12/2/24.    Current treatment plan:   Weekly Carboplatin/Paclitaxel + RT. Started on 12/10/24 (patient chose to delay treatment).  Due for week 4 tomorrow.   Durvalumab maintenance.     Chief Complaint: Constipation and Other Misc (Pt reports throat pain from XRT.)    HPI  Patient presents for follow-up of NSCLC. She is doing fairly well. Started treatment earlier this month with concurrent RT and weekly Carbo/Taxol. Due for week 4 tomorrow. Reports no side effects aside from fatigue and constipation. She will start a stool softener daily. Also mentions she is having oral/throat pain and that [facility] called in MMW for her to start. No other problems.     Past Medical History:   Diagnosis Date    Arthritis     Hyperlipidemia     Lymphadenopathy     Malignant neoplasm of upper lobe of right lung 09/13/2024    Pulmonary nodule     Thyroid disease       Past Surgical History:   Procedure Laterality Date    CHOLECYSTECTOMY      COLONOSCOPY      INSERTION OF TUNNELED CENTRAL VENOUS CATHETER (CVC) WITH SUBCUTANEOUS PORT N/A 12/2/2024    Procedure: [id number];  Surgeon: Melony Collier MD;  Location: [facility] OR;  Service: Cardiothoracic;  Laterality: N/A;  MEDIPORT FOR CHEMO    MEDIASTINOSCOPY N/A 8/29/2024    Procedure: MEDIASTINOSCOPY;  Surgeon: Melony Collier MD;  Location: [facility] OR;  Service: Cardiothoracic;  Laterality: N/A;    THYROIDECTOMY, PARTIAL      TUBAL LIGATION       No family history on file.  Social History     Socioeconomic History    Marital status: Single    Years of education: 11   Tobacco Use    Smoking status: Former     Current packs/day: 0.10     Average packs/day: 0.5 packs/day for 35.1 years (17.5 ttl pk-yrs)     Types: Cigarettes     Start date: 11/19/2024     Quit date: 11/2024    Tobacco comments:     Pt states that she quit  10/12/24.   Substance and Sexual Activity    Alcohol use: No     Alcohol/week: 0.0 standard drinks of alcohol    Drug use: No    Sexual activity: Yes     Partners: Male     Social Drivers of Health     Financial Resource Strain: Low Risk  (11/13/2024)    Overall Financial Resource Strain (CARDIA)     Difficulty of Paying Living Expenses: Not hard at all   Food Insecurity: No Food Insecurity (11/13/2024)    Hunger Vital Sign     Worried About Running Out of Food in the Last Year: Never true     Ran Out of Food in the Last Year: Never true   Physical Activity: Inactive (11/13/2024)    Exercise Vital Sign     Days of Exercise per Week: 0 days     Minutes of Exercise per Session: 0 min   Stress: Stress Concern Present (11/13/2024)    [employer] - Occupational Stress Questionnaire     Feeling of Stress : To some extent   Housing Stability: Unknown (11/13/2024)    Housing Stability Vital Sign     Unable to Pay for Housing in the Last Year: No       Review of patient's allergies indicates:  No Known Allergies   Current Outpatient Medications on File Prior to Visit   Medication Sig Dispense Refill    buPROPion (WELLBUTRIN XL) 300 MG 24 hr tablet Take 300 mg by mouth every morning.      cholecalciferol, vitamin D3, 1,250 mcg (50,000 unit) capsule Take 50,000 Units by mouth every 7 days.      iron-vitamin C 100-250 mg, ICAR-C, 100-250 mg Tab Take 1 tablet by mouth every other day.      levothyroxine (SYNTHROID) 88 MCG tablet Take 88 mcg by mouth once daily.      meloxicam (MOBIC) 15 MG tablet Take 15 mg by mouth daily as needed for Pain.      HYDROcodone-acetaminophen (NORCO) 5-325 mg per tablet Take 1 tablet by mouth every 6 (six) hours as needed for Pain. (Patient not taking: Reported on 12/30/2024) 20 tablet 0    LORazepam (ATIVAN) 1 MG tablet Take 1 mg by mouth every 12 (twelve) hours as needed for Anxiety. (Patient not taking: Reported on 12/3/2024)      ondansetron (ZOFRAN) 8 MG tablet Take  "1 tablet (8 mg total) by mouth every 8 (eight) hours as needed for Nausea. (Patient not taking: Reported on 12/30/2024) 20 tablet 1    prochlorperazine (COMPAZINE) 5 MG tablet Take 1 tablet (5 mg total) by mouth every 6 (six) hours as needed for Nausea. (Patient not taking: Reported on 12/30/2024) 20 tablet 1     No current facility-administered medications on file prior to visit.      Review of Systems   Constitutional:  Positive for fatigue. Negative for appetite change and unexpected weight change.   HENT:  Positive for sore throat. Negative for mouth sores.    Eyes:  Negative for visual disturbance.   Respiratory:  Negative for cough and shortness of breath.    Cardiovascular:  Negative for chest pain.   Gastrointestinal:  Positive for constipation. Negative for abdominal pain and diarrhea.   Genitourinary:  Negative for frequency.   Musculoskeletal:  Negative for back pain.   Integumentary:  Negative for rash.   Neurological:  Negative for headaches.   Hematological:  Negative for adenopathy.   Psychiatric/Behavioral:  The patient is not nervous/anxious.          Vitals:    12/30/24 0956   BP: 121/80   Pulse: 73   Resp: 14   Temp: 98.4 °F (36.9 °C)   TempSrc: Oral   SpO2: 99%   Weight: 66 kg (145 lb 6.4 oz)   Height: 5' 2" (1.575 m)     Physical Exam  Constitutional:       Appearance: Normal appearance. She is normal weight.   HENT:      Head: Normocephalic.      Nose: Nose normal.      Mouth/Throat:      Mouth: Mucous membranes are moist.   Eyes:      Extraocular Movements: Extraocular movements intact.      Conjunctiva/sclera: Conjunctivae normal.   Cardiovascular:      Rate and Rhythm: Normal rate and regular rhythm.   Pulmonary:      Effort: Pulmonary effort is normal.      Breath sounds: Normal breath sounds.   Chest:      Comments: Anterior superior chest with incision from mediastinoscopy healed well; left chest wall mediport in place healed well  Abdominal:      General: Bowel sounds are normal. There " is no distension.      Palpations: Abdomen is soft.      Tenderness: There is no abdominal tenderness.   Musculoskeletal:         General: Normal range of motion.   Skin:     General: Skin is warm.   Neurological:      General: No focal deficit present.      Mental Status: She is alert and oriented to person, place, and time.   Psychiatric:         Mood and Affect: Mood normal.         Behavior: Behavior is cooperative.         Judgment: Judgment normal.       Lab Visit on 12/30/2024   Component Date Value    WBC 12/30/2024 3.81 (L)     RBC 12/30/2024 3.76 (L)     Hgb 12/30/2024 12.0     Hct 12/30/2024 36.0 (L)     MCV 12/30/2024 95.7 (H)     MCH 12/30/2024 31.9 (H)     MCHC 12/30/2024 33.3     RDW 12/30/2024 13.0     Platelet 12/30/2024 229     MPV 12/30/2024 9.1     Neut % 12/30/2024 76.1     Lymph % 12/30/2024 15.2     Mono % 12/30/2024 7.6     Eos % 12/30/2024 0.0     Basophil % 12/30/2024 0.8     Lymph # 12/30/2024 0.58 (L)     Neut # 12/30/2024 2.90     Mono # 12/30/2024 0.29     Eos # 12/30/2024 0.00     Baso # 12/30/2024 0.03     IG# 12/30/2024 0.01     IG% 12/30/2024 0.3           Assessment:       1. 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 Secondary malignant neoplasm of mediastinum    4. Cigarette smoker        Plan:       Patient with Stage IIIA Lung cancer, NSCLC TTF-1+, poorly differentiated, not specified if adenocarcinoma or squamous cell carcinoma, although did confirm with pathology this is NSCLC. [employer] testing with PD-L1 85% and KRAS G12C mutation, diagnosed 8/29/24.   Primary tumor is either occult or may be the 7mm spiculated RUL mass. This was biopsied previously via bronchoscopy and reportedly negative. Will obtain records.  PET shows hypermetabolic right hilar LN and paratracheal LN, and the 7mm RUL lung nodule is not hypermetabolic, though too small to be evaluated by PET.  Brain MRI with no metastatic disease.   Case discussed with  Dr. Kay.     Per NCCN guidelines, treatment recommended with definitive concurrent chemoradiation (Category 1) followed by Durvalumab maintenance.   Patient delayed her treatment for family trip and had 2nd opinion in [place] and agreed with plan.    Treatment started with concurrent RT and weekly Carboplatin/Paclitaxel on 12/10/24.   Due for Week 4 tomorrow.   Labs today with mild leukopenia, normal ANC. CMP and Mg pending.   Ok to proceed with treatment if labs return good.   Continue with weekly labs.   Tentative completion in 3-4 weeks.   Follow-up in 3 weeks with labs.     Once she has completed chemo/RT, plan for repeat CT chest 4 weeks after and begin Durvalumab maintenance.   All questions answered at this time.   Smoking cessation strongly encouraged and she has quit!      CRISTÓBAL Encarnacion

## 2024-12-19 PROCEDURE — 77386 HC IMRT, COMPLEX: CPT | Performed by: RADIOLOGY

## 2024-12-20 PROCEDURE — 77386 HC IMRT, COMPLEX: CPT

## 2024-12-23 ENCOUNTER — LAB VISIT (OUTPATIENT)
Dept: LAB | Facility: HOSPITAL | Age: 64
End: 2024-12-23
Attending: INTERNAL MEDICINE
Payer: MEDICARE

## 2024-12-23 DIAGNOSIS — C34.11 MALIGNANT NEOPLASM OF UPPER LOBE OF RIGHT LUNG: ICD-10-CM

## 2024-12-23 LAB
ALBUMIN SERPL-MCNC: 3.5 G/DL (ref 3.4–4.8)
ALBUMIN/GLOB SERPL: 1.1 RATIO (ref 1.1–2)
ALP SERPL-CCNC: 64 UNIT/L (ref 40–150)
ALT SERPL-CCNC: <5 UNIT/L (ref 0–55)
ANION GAP SERPL CALC-SCNC: 5 MEQ/L
AST SERPL-CCNC: 12 UNIT/L (ref 5–34)
BASOPHILS # BLD AUTO: 0.02 X10(3)/MCL
BASOPHILS NFR BLD AUTO: 0.4 %
BILIRUB SERPL-MCNC: 0.5 MG/DL
BUN SERPL-MCNC: 20.1 MG/DL (ref 9.8–20.1)
CALCIUM SERPL-MCNC: 9.1 MG/DL (ref 8.4–10.2)
CHLORIDE SERPL-SCNC: 107 MMOL/L (ref 98–107)
CO2 SERPL-SCNC: 29 MMOL/L (ref 23–31)
CREAT SERPL-MCNC: 0.82 MG/DL (ref 0.55–1.02)
CREAT/UREA NIT SERPL: 25
EOSINOPHIL # BLD AUTO: 0.01 X10(3)/MCL (ref 0–0.9)
EOSINOPHIL NFR BLD AUTO: 0.2 %
ERYTHROCYTE [DISTWIDTH] IN BLOOD BY AUTOMATED COUNT: 12.6 % (ref 11.5–17)
GFR SERPLBLD CREATININE-BSD FMLA CKD-EPI: >60 ML/MIN/1.73/M2
GLOBULIN SER-MCNC: 3.3 GM/DL (ref 2.4–3.5)
GLUCOSE SERPL-MCNC: 88 MG/DL (ref 82–115)
HCT VFR BLD AUTO: 36.9 % (ref 37–47)
HGB BLD-MCNC: 12.6 G/DL (ref 12–16)
IMM GRANULOCYTES # BLD AUTO: 0.02 X10(3)/MCL (ref 0–0.04)
IMM GRANULOCYTES NFR BLD AUTO: 0.4 %
LYMPHOCYTES # BLD AUTO: 0.79 X10(3)/MCL (ref 0.6–4.6)
LYMPHOCYTES NFR BLD AUTO: 17 %
MAGNESIUM SERPL-MCNC: 2.2 MG/DL (ref 1.6–2.6)
MCH RBC QN AUTO: 33.1 PG (ref 27–31)
MCHC RBC AUTO-ENTMCNC: 34.1 G/DL (ref 33–36)
MCV RBC AUTO: 96.9 FL (ref 80–94)
MONOCYTES # BLD AUTO: 0.31 X10(3)/MCL (ref 0.1–1.3)
MONOCYTES NFR BLD AUTO: 6.7 %
NEUTROPHILS # BLD AUTO: 3.5 X10(3)/MCL (ref 2.1–9.2)
NEUTROPHILS NFR BLD AUTO: 75.3 %
PLATELET # BLD AUTO: 245 X10(3)/MCL (ref 130–400)
PMV BLD AUTO: 10 FL (ref 7.4–10.4)
POTASSIUM SERPL-SCNC: 4.6 MMOL/L (ref 3.5–5.1)
PROT SERPL-MCNC: 6.8 GM/DL (ref 5.8–7.6)
RBC # BLD AUTO: 3.81 X10(6)/MCL (ref 4.2–5.4)
SODIUM SERPL-SCNC: 141 MMOL/L (ref 136–145)
WBC # BLD AUTO: 4.65 X10(3)/MCL (ref 4.5–11.5)

## 2024-12-23 PROCEDURE — 85025 COMPLETE CBC W/AUTO DIFF WBC: CPT

## 2024-12-23 PROCEDURE — 77336 RADIATION PHYSICS CONSULT: CPT | Performed by: RADIOLOGY

## 2024-12-23 PROCEDURE — 77386 HC IMRT, COMPLEX: CPT | Performed by: RADIOLOGY

## 2024-12-23 PROCEDURE — 36415 COLL VENOUS BLD VENIPUNCTURE: CPT

## 2024-12-23 PROCEDURE — 83735 ASSAY OF MAGNESIUM: CPT

## 2024-12-23 PROCEDURE — 80053 COMPREHEN METABOLIC PANEL: CPT

## 2024-12-23 RX ORDER — DIPHENHYDRAMINE HYDROCHLORIDE 50 MG/ML
25 INJECTION INTRAMUSCULAR; INTRAVENOUS
Status: CANCELLED
Start: 2024-12-24

## 2024-12-23 RX ORDER — FAMOTIDINE 10 MG/ML
20 INJECTION INTRAVENOUS
Status: CANCELLED | OUTPATIENT
Start: 2024-12-24

## 2024-12-23 RX ORDER — DIPHENHYDRAMINE HYDROCHLORIDE 50 MG/ML
50 INJECTION INTRAMUSCULAR; INTRAVENOUS ONCE AS NEEDED
Status: CANCELLED | OUTPATIENT
Start: 2024-12-24

## 2024-12-23 RX ORDER — SODIUM CHLORIDE 0.9 % (FLUSH) 0.9 %
10 SYRINGE (ML) INJECTION
Status: CANCELLED | OUTPATIENT
Start: 2024-12-24

## 2024-12-23 RX ORDER — EPINEPHRINE 0.3 MG/.3ML
0.3 INJECTION SUBCUTANEOUS ONCE AS NEEDED
Status: CANCELLED | OUTPATIENT
Start: 2024-12-24

## 2024-12-23 RX ORDER — HEPARIN 100 UNIT/ML
500 SYRINGE INTRAVENOUS
Status: CANCELLED | OUTPATIENT
Start: 2024-12-24

## 2024-12-24 ENCOUNTER — INFUSION (OUTPATIENT)
Dept: INFUSION THERAPY | Facility: HOSPITAL | Age: 64
End: 2024-12-24
Attending: INTERNAL MEDICINE
Payer: MEDICARE

## 2024-12-24 VITALS
SYSTOLIC BLOOD PRESSURE: 116 MMHG | BODY MASS INDEX: 26.78 KG/M2 | HEART RATE: 99 BPM | WEIGHT: 145.5 LBS | HEIGHT: 62 IN | DIASTOLIC BLOOD PRESSURE: 67 MMHG | RESPIRATION RATE: 18 BRPM

## 2024-12-24 DIAGNOSIS — C34.11 MALIGNANT NEOPLASM OF UPPER LOBE OF RIGHT LUNG: Primary | ICD-10-CM

## 2024-12-24 PROCEDURE — 25000003 PHARM REV CODE 250: Performed by: INTERNAL MEDICINE

## 2024-12-24 PROCEDURE — 96375 TX/PRO/DX INJ NEW DRUG ADDON: CPT

## 2024-12-24 PROCEDURE — 96417 CHEMO IV INFUS EACH ADDL SEQ: CPT

## 2024-12-24 PROCEDURE — 77386 HC IMRT, COMPLEX: CPT

## 2024-12-24 PROCEDURE — 63600175 PHARM REV CODE 636 W HCPCS: Performed by: INTERNAL MEDICINE

## 2024-12-24 PROCEDURE — 96413 CHEMO IV INFUSION 1 HR: CPT

## 2024-12-24 PROCEDURE — 96367 TX/PROPH/DG ADDL SEQ IV INF: CPT

## 2024-12-24 RX ORDER — EPINEPHRINE 0.3 MG/.3ML
0.3 INJECTION SUBCUTANEOUS ONCE AS NEEDED
Status: DISCONTINUED | OUTPATIENT
Start: 2024-12-24 | End: 2024-12-24 | Stop reason: HOSPADM

## 2024-12-24 RX ORDER — FAMOTIDINE 10 MG/ML
20 INJECTION INTRAVENOUS
Status: COMPLETED | OUTPATIENT
Start: 2024-12-24 | End: 2024-12-24

## 2024-12-24 RX ORDER — DIPHENHYDRAMINE HYDROCHLORIDE 50 MG/ML
25 INJECTION INTRAMUSCULAR; INTRAVENOUS
Status: COMPLETED | OUTPATIENT
Start: 2024-12-24 | End: 2024-12-24

## 2024-12-24 RX ORDER — DIPHENHYDRAMINE HYDROCHLORIDE 50 MG/ML
50 INJECTION INTRAMUSCULAR; INTRAVENOUS ONCE AS NEEDED
Status: DISCONTINUED | OUTPATIENT
Start: 2024-12-24 | End: 2024-12-24 | Stop reason: HOSPADM

## 2024-12-24 RX ORDER — HEPARIN 100 UNIT/ML
500 SYRINGE INTRAVENOUS
Status: DISCONTINUED | OUTPATIENT
Start: 2024-12-24 | End: 2024-12-24 | Stop reason: HOSPADM

## 2024-12-24 RX ORDER — SODIUM CHLORIDE 0.9 % (FLUSH) 0.9 %
10 SYRINGE (ML) INJECTION
Status: DISCONTINUED | OUTPATIENT
Start: 2024-12-24 | End: 2024-12-24 | Stop reason: HOSPADM

## 2024-12-24 RX ADMIN — PACLITAXEL 78 MG: 6 INJECTION, SOLUTION INTRAVENOUS at 11:12

## 2024-12-24 RX ADMIN — DIPHENHYDRAMINE HYDROCHLORIDE 25 MG: 50 INJECTION INTRAMUSCULAR; INTRAVENOUS at 11:12

## 2024-12-24 RX ADMIN — CARBOPLATIN 190 MG: 10 INJECTION, SOLUTION INTRAVENOUS at 12:12

## 2024-12-24 RX ADMIN — SODIUM CHLORIDE 0.25 MG: 9 INJECTION, SOLUTION INTRAVENOUS at 11:12

## 2024-12-24 RX ADMIN — HEPARIN 500 UNITS: 100 SYRINGE at 12:12

## 2024-12-24 RX ADMIN — FAMOTIDINE 20 MG: 10 INJECTION, SOLUTION INTRAVENOUS at 11:12

## 2024-12-26 PROCEDURE — 77386 HC IMRT, COMPLEX: CPT | Performed by: RADIOLOGY

## 2024-12-27 PROCEDURE — 77386 HC IMRT, COMPLEX: CPT

## 2024-12-30 ENCOUNTER — OFFICE VISIT (OUTPATIENT)
Dept: HEMATOLOGY/ONCOLOGY | Facility: CLINIC | Age: 64
End: 2024-12-30
Payer: MEDICARE

## 2024-12-30 ENCOUNTER — LAB VISIT (OUTPATIENT)
Dept: LAB | Facility: HOSPITAL | Age: 64
End: 2024-12-30
Attending: INTERNAL MEDICINE
Payer: MEDICARE

## 2024-12-30 VITALS
SYSTOLIC BLOOD PRESSURE: 121 MMHG | DIASTOLIC BLOOD PRESSURE: 80 MMHG | TEMPERATURE: 98 F | HEIGHT: 62 IN | OXYGEN SATURATION: 99 % | RESPIRATION RATE: 14 BRPM | BODY MASS INDEX: 26.75 KG/M2 | HEART RATE: 73 BPM | WEIGHT: 145.38 LBS

## 2024-12-30 DIAGNOSIS — F17.210 CIGARETTE SMOKER: ICD-10-CM

## 2024-12-30 DIAGNOSIS — C78.1 SECONDARY MALIGNANT NEOPLASM OF MEDIASTINUM: ICD-10-CM

## 2024-12-30 DIAGNOSIS — C34.11 MALIGNANT NEOPLASM OF UPPER LOBE OF RIGHT LUNG: Primary | ICD-10-CM

## 2024-12-30 DIAGNOSIS — T45.1X5A IMMUNODEFICIENCY DUE TO CHEMOTHERAPY: ICD-10-CM

## 2024-12-30 DIAGNOSIS — C34.11 MALIGNANT NEOPLASM OF UPPER LOBE OF RIGHT LUNG: ICD-10-CM

## 2024-12-30 DIAGNOSIS — D84.821 IMMUNODEFICIENCY DUE TO CHEMOTHERAPY: ICD-10-CM

## 2024-12-30 DIAGNOSIS — Z79.899 IMMUNODEFICIENCY DUE TO CHEMOTHERAPY: ICD-10-CM

## 2024-12-30 LAB
ALBUMIN SERPL-MCNC: 3.5 G/DL (ref 3.4–4.8)
ALBUMIN/GLOB SERPL: 1.1 RATIO (ref 1.1–2)
ALP SERPL-CCNC: 61 UNIT/L (ref 40–150)
ALT SERPL-CCNC: 9 UNIT/L (ref 0–55)
ANION GAP SERPL CALC-SCNC: 7 MEQ/L
AST SERPL-CCNC: 13 UNIT/L (ref 5–34)
BASOPHILS # BLD AUTO: 0.03 X10(3)/MCL
BASOPHILS NFR BLD AUTO: 0.8 %
BILIRUB SERPL-MCNC: 0.6 MG/DL
BUN SERPL-MCNC: 17.6 MG/DL (ref 9.8–20.1)
CALCIUM SERPL-MCNC: 9.3 MG/DL (ref 8.4–10.2)
CHLORIDE SERPL-SCNC: 107 MMOL/L (ref 98–107)
CO2 SERPL-SCNC: 26 MMOL/L (ref 23–31)
CREAT SERPL-MCNC: 0.86 MG/DL (ref 0.55–1.02)
CREAT/UREA NIT SERPL: 20
EOSINOPHIL # BLD AUTO: 0 X10(3)/MCL (ref 0–0.9)
EOSINOPHIL NFR BLD AUTO: 0 %
ERYTHROCYTE [DISTWIDTH] IN BLOOD BY AUTOMATED COUNT: 13 % (ref 11.5–17)
GFR SERPLBLD CREATININE-BSD FMLA CKD-EPI: >60 ML/MIN/1.73/M2
GLOBULIN SER-MCNC: 3.2 GM/DL (ref 2.4–3.5)
GLUCOSE SERPL-MCNC: 85 MG/DL (ref 82–115)
HCT VFR BLD AUTO: 36 % (ref 37–47)
HGB BLD-MCNC: 12 G/DL (ref 12–16)
IMM GRANULOCYTES # BLD AUTO: 0.01 X10(3)/MCL (ref 0–0.04)
IMM GRANULOCYTES NFR BLD AUTO: 0.3 %
LYMPHOCYTES # BLD AUTO: 0.58 X10(3)/MCL (ref 0.6–4.6)
LYMPHOCYTES NFR BLD AUTO: 15.2 %
MAGNESIUM SERPL-MCNC: 2.1 MG/DL (ref 1.6–2.6)
MCH RBC QN AUTO: 31.9 PG (ref 27–31)
MCHC RBC AUTO-ENTMCNC: 33.3 G/DL (ref 33–36)
MCV RBC AUTO: 95.7 FL (ref 80–94)
MONOCYTES # BLD AUTO: 0.29 X10(3)/MCL (ref 0.1–1.3)
MONOCYTES NFR BLD AUTO: 7.6 %
NEUTROPHILS # BLD AUTO: 2.9 X10(3)/MCL (ref 2.1–9.2)
NEUTROPHILS NFR BLD AUTO: 76.1 %
PLATELET # BLD AUTO: 229 X10(3)/MCL (ref 130–400)
PMV BLD AUTO: 9.1 FL (ref 7.4–10.4)
POTASSIUM SERPL-SCNC: 4.5 MMOL/L (ref 3.5–5.1)
PROT SERPL-MCNC: 6.7 GM/DL (ref 5.8–7.6)
RBC # BLD AUTO: 3.76 X10(6)/MCL (ref 4.2–5.4)
SODIUM SERPL-SCNC: 140 MMOL/L (ref 136–145)
WBC # BLD AUTO: 3.81 X10(3)/MCL (ref 4.5–11.5)

## 2024-12-30 PROCEDURE — 85025 COMPLETE CBC W/AUTO DIFF WBC: CPT

## 2024-12-30 PROCEDURE — 80053 COMPREHEN METABOLIC PANEL: CPT

## 2024-12-30 PROCEDURE — 77336 RADIATION PHYSICS CONSULT: CPT

## 2024-12-30 PROCEDURE — 77386 HC IMRT, COMPLEX: CPT

## 2024-12-30 PROCEDURE — 1159F MED LIST DOCD IN RCRD: CPT | Mod: CPTII,S$GLB,, | Performed by: NURSE PRACTITIONER

## 2024-12-30 PROCEDURE — 3008F BODY MASS INDEX DOCD: CPT | Mod: CPTII,S$GLB,, | Performed by: NURSE PRACTITIONER

## 2024-12-30 PROCEDURE — 1101F PT FALLS ASSESS-DOCD LE1/YR: CPT | Mod: CPTII,S$GLB,, | Performed by: NURSE PRACTITIONER

## 2024-12-30 PROCEDURE — 36415 COLL VENOUS BLD VENIPUNCTURE: CPT

## 2024-12-30 PROCEDURE — 3074F SYST BP LT 130 MM HG: CPT | Mod: CPTII,S$GLB,, | Performed by: NURSE PRACTITIONER

## 2024-12-30 PROCEDURE — 99215 OFFICE O/P EST HI 40 MIN: CPT | Mod: S$GLB,,, | Performed by: NURSE PRACTITIONER

## 2024-12-30 PROCEDURE — 1160F RVW MEDS BY RX/DR IN RCRD: CPT | Mod: CPTII,S$GLB,, | Performed by: NURSE PRACTITIONER

## 2024-12-30 PROCEDURE — 83735 ASSAY OF MAGNESIUM: CPT

## 2024-12-30 PROCEDURE — 99999 PR PBB SHADOW E&M-EST. PATIENT-LVL IV: CPT | Mod: PBBFAC,,, | Performed by: NURSE PRACTITIONER

## 2024-12-30 PROCEDURE — 1125F AMNT PAIN NOTED PAIN PRSNT: CPT | Mod: CPTII,S$GLB,, | Performed by: NURSE PRACTITIONER

## 2024-12-30 PROCEDURE — 3288F FALL RISK ASSESSMENT DOCD: CPT | Mod: CPTII,S$GLB,, | Performed by: NURSE PRACTITIONER

## 2024-12-30 PROCEDURE — 3079F DIAST BP 80-89 MM HG: CPT | Mod: CPTII,S$GLB,, | Performed by: NURSE PRACTITIONER

## 2024-12-30 RX ORDER — EPINEPHRINE 0.3 MG/.3ML
0.3 INJECTION SUBCUTANEOUS ONCE AS NEEDED
Status: CANCELLED | OUTPATIENT
Start: 2024-12-31

## 2024-12-30 RX ORDER — EPINEPHRINE 0.3 MG/.3ML
0.3 INJECTION SUBCUTANEOUS ONCE AS NEEDED
OUTPATIENT
Start: 2025-01-07

## 2024-12-30 RX ORDER — HEPARIN 100 UNIT/ML
500 SYRINGE INTRAVENOUS
OUTPATIENT
Start: 2025-01-07

## 2024-12-30 RX ORDER — FAMOTIDINE 10 MG/ML
20 INJECTION INTRAVENOUS
OUTPATIENT
Start: 2025-01-07

## 2024-12-30 RX ORDER — DIPHENHYDRAMINE HYDROCHLORIDE 50 MG/ML
50 INJECTION INTRAMUSCULAR; INTRAVENOUS ONCE AS NEEDED
OUTPATIENT
Start: 2025-01-07

## 2024-12-30 RX ORDER — FAMOTIDINE 10 MG/ML
20 INJECTION INTRAVENOUS
Status: CANCELLED | OUTPATIENT
Start: 2024-12-31

## 2024-12-30 RX ORDER — DIPHENHYDRAMINE HYDROCHLORIDE 50 MG/ML
25 INJECTION INTRAMUSCULAR; INTRAVENOUS
Status: CANCELLED
Start: 2024-12-31

## 2024-12-30 RX ORDER — DIPHENHYDRAMINE HYDROCHLORIDE 50 MG/ML
50 INJECTION INTRAMUSCULAR; INTRAVENOUS ONCE AS NEEDED
Status: CANCELLED | OUTPATIENT
Start: 2024-12-31

## 2024-12-30 RX ORDER — HEPARIN 100 UNIT/ML
500 SYRINGE INTRAVENOUS
Status: CANCELLED | OUTPATIENT
Start: 2024-12-31

## 2024-12-30 RX ORDER — DIPHENHYDRAMINE HYDROCHLORIDE 50 MG/ML
25 INJECTION INTRAMUSCULAR; INTRAVENOUS
Start: 2025-01-07

## 2024-12-30 RX ORDER — SODIUM CHLORIDE 0.9 % (FLUSH) 0.9 %
10 SYRINGE (ML) INJECTION
OUTPATIENT
Start: 2025-01-07

## 2024-12-30 RX ORDER — SODIUM CHLORIDE 0.9 % (FLUSH) 0.9 %
10 SYRINGE (ML) INJECTION
Status: CANCELLED | OUTPATIENT
Start: 2024-12-31

## 2024-12-31 ENCOUNTER — INFUSION (OUTPATIENT)
Dept: INFUSION THERAPY | Facility: HOSPITAL | Age: 64
End: 2024-12-31
Attending: INTERNAL MEDICINE
Payer: MEDICARE

## 2024-12-31 VITALS
HEART RATE: 75 BPM | SYSTOLIC BLOOD PRESSURE: 127 MMHG | DIASTOLIC BLOOD PRESSURE: 61 MMHG | OXYGEN SATURATION: 100 % | TEMPERATURE: 98 F

## 2024-12-31 DIAGNOSIS — C34.11 MALIGNANT NEOPLASM OF UPPER LOBE OF RIGHT LUNG: Primary | ICD-10-CM

## 2024-12-31 PROCEDURE — A4216 STERILE WATER/SALINE, 10 ML: HCPCS | Performed by: NURSE PRACTITIONER

## 2024-12-31 PROCEDURE — 96375 TX/PRO/DX INJ NEW DRUG ADDON: CPT

## 2024-12-31 PROCEDURE — 25000003 PHARM REV CODE 250: Performed by: NURSE PRACTITIONER

## 2024-12-31 PROCEDURE — 63600175 PHARM REV CODE 636 W HCPCS: Performed by: NURSE PRACTITIONER

## 2024-12-31 PROCEDURE — 96413 CHEMO IV INFUSION 1 HR: CPT

## 2024-12-31 PROCEDURE — 96417 CHEMO IV INFUS EACH ADDL SEQ: CPT

## 2024-12-31 PROCEDURE — 77386 HC IMRT, COMPLEX: CPT | Performed by: RADIOLOGY

## 2024-12-31 PROCEDURE — 96367 TX/PROPH/DG ADDL SEQ IV INF: CPT

## 2024-12-31 RX ORDER — EPINEPHRINE 0.3 MG/.3ML
0.3 INJECTION SUBCUTANEOUS ONCE AS NEEDED
Status: DISCONTINUED | OUTPATIENT
Start: 2024-12-31 | End: 2024-12-31 | Stop reason: HOSPADM

## 2024-12-31 RX ORDER — SODIUM CHLORIDE 0.9 % (FLUSH) 0.9 %
10 SYRINGE (ML) INJECTION
Status: DISCONTINUED | OUTPATIENT
Start: 2024-12-31 | End: 2024-12-31 | Stop reason: HOSPADM

## 2024-12-31 RX ORDER — FAMOTIDINE 10 MG/ML
20 INJECTION INTRAVENOUS
Status: COMPLETED | OUTPATIENT
Start: 2024-12-31 | End: 2024-12-31

## 2024-12-31 RX ORDER — DIPHENHYDRAMINE HYDROCHLORIDE 50 MG/ML
25 INJECTION INTRAMUSCULAR; INTRAVENOUS
Status: COMPLETED | OUTPATIENT
Start: 2024-12-31 | End: 2024-12-31

## 2024-12-31 RX ORDER — HEPARIN 100 UNIT/ML
500 SYRINGE INTRAVENOUS
Status: DISCONTINUED | OUTPATIENT
Start: 2024-12-31 | End: 2024-12-31 | Stop reason: HOSPADM

## 2024-12-31 RX ORDER — DIPHENHYDRAMINE HYDROCHLORIDE 50 MG/ML
50 INJECTION INTRAMUSCULAR; INTRAVENOUS ONCE AS NEEDED
Status: DISCONTINUED | OUTPATIENT
Start: 2024-12-31 | End: 2024-12-31 | Stop reason: HOSPADM

## 2024-12-31 RX ADMIN — FAMOTIDINE 20 MG: 10 INJECTION, SOLUTION INTRAVENOUS at 08:12

## 2024-12-31 RX ADMIN — HEPARIN 500 UNITS: 100 SYRINGE at 11:12

## 2024-12-31 RX ADMIN — SODIUM CHLORIDE: 9 INJECTION, SOLUTION INTRAVENOUS at 08:12

## 2024-12-31 RX ADMIN — PACLITAXEL 78 MG: 6 INJECTION, SOLUTION INTRAVENOUS at 09:12

## 2024-12-31 RX ADMIN — CARBOPLATIN 185 MG: 10 INJECTION, SOLUTION INTRAVENOUS at 10:12

## 2024-12-31 RX ADMIN — SODIUM CHLORIDE 0.25 MG: 9 INJECTION, SOLUTION INTRAVENOUS at 08:12

## 2024-12-31 RX ADMIN — Medication 10 ML: at 11:12

## 2024-12-31 RX ADMIN — DIPHENHYDRAMINE HYDROCHLORIDE 25 MG: 50 INJECTION INTRAMUSCULAR; INTRAVENOUS at 09:12

## 2024-12-31 NOTE — PLAN OF CARE
Pt received c4 taxol/carbo; tolerated well.  Next appts reviewed with pt--pt states follows on portal.

## 2025-01-02 ENCOUNTER — CLINICAL SUPPORT (OUTPATIENT)
Dept: RADIATION THERAPY | Facility: HOSPITAL | Age: 65
End: 2025-01-02
Attending: RADIOLOGY
Payer: MEDICARE

## 2025-01-02 ENCOUNTER — TELEPHONE (OUTPATIENT)
Dept: HEMATOLOGY/ONCOLOGY | Facility: CLINIC | Age: 65
End: 2025-01-02
Payer: MEDICARE

## 2025-01-02 PROCEDURE — 77386 HC IMRT, COMPLEX: CPT | Performed by: RADIOLOGY

## 2025-01-03 ENCOUNTER — HOSPITAL ENCOUNTER (EMERGENCY)
Facility: HOSPITAL | Age: 65
Discharge: HOME | End: 2025-01-03
Attending: EMERGENCY MEDICINE
Payer: MEDICARE

## 2025-01-03 VITALS
DIASTOLIC BLOOD PRESSURE: 85 MMHG | WEIGHT: 145 LBS | HEIGHT: 62 IN | OXYGEN SATURATION: 99 % | RESPIRATION RATE: 12 BRPM | HEART RATE: 69 BPM | BODY MASS INDEX: 26.68 KG/M2 | TEMPERATURE: 98 F | SYSTOLIC BLOOD PRESSURE: 137 MMHG

## 2025-01-03 DIAGNOSIS — R07.89 MUSCULOSKELETAL CHEST PAIN: Primary | ICD-10-CM

## 2025-01-03 LAB
ALBUMIN SERPL-MCNC: 3.3 G/DL (ref 3.4–4.8)
ALBUMIN/GLOB SERPL: 1.1 RATIO (ref 1.1–2)
ALP SERPL-CCNC: 56 UNIT/L (ref 40–150)
ALT SERPL-CCNC: 10 UNIT/L (ref 0–55)
ANION GAP SERPL CALC-SCNC: 8 MEQ/L
AST SERPL-CCNC: 13 UNIT/L (ref 5–34)
BASOPHILS # BLD AUTO: 0.02 X10(3)/MCL
BASOPHILS NFR BLD AUTO: 0.5 %
BILIRUB SERPL-MCNC: 0.3 MG/DL
BNP BLD-MCNC: 50.1 PG/ML
BUN SERPL-MCNC: 17.8 MG/DL (ref 9.8–20.1)
CALCIUM SERPL-MCNC: 8.9 MG/DL (ref 8.4–10.2)
CHLORIDE SERPL-SCNC: 105 MMOL/L (ref 98–107)
CO2 SERPL-SCNC: 25 MMOL/L (ref 23–31)
CREAT SERPL-MCNC: 0.84 MG/DL (ref 0.55–1.02)
CREAT/UREA NIT SERPL: 21
EOSINOPHIL # BLD AUTO: 0.01 X10(3)/MCL (ref 0–0.9)
EOSINOPHIL NFR BLD AUTO: 0.3 %
ERYTHROCYTE [DISTWIDTH] IN BLOOD BY AUTOMATED COUNT: 13.2 % (ref 11.5–17)
GFR SERPLBLD CREATININE-BSD FMLA CKD-EPI: >60 ML/MIN/1.73/M2
GLOBULIN SER-MCNC: 3.1 GM/DL (ref 2.4–3.5)
GLUCOSE SERPL-MCNC: 84 MG/DL (ref 82–115)
HCT VFR BLD AUTO: 34 % (ref 37–47)
HGB BLD-MCNC: 11.2 G/DL (ref 12–16)
IMM GRANULOCYTES # BLD AUTO: 0.02 X10(3)/MCL (ref 0–0.04)
IMM GRANULOCYTES NFR BLD AUTO: 0.5 %
LYMPHOCYTES # BLD AUTO: 0.82 X10(3)/MCL (ref 0.6–4.6)
LYMPHOCYTES NFR BLD AUTO: 22.5 %
MCH RBC QN AUTO: 31 PG (ref 27–31)
MCHC RBC AUTO-ENTMCNC: 32.9 G/DL (ref 33–36)
MCV RBC AUTO: 94.2 FL (ref 80–94)
MONOCYTES # BLD AUTO: 0.19 X10(3)/MCL (ref 0.1–1.3)
MONOCYTES NFR BLD AUTO: 5.2 %
NEUTROPHILS # BLD AUTO: 2.59 X10(3)/MCL (ref 2.1–9.2)
NEUTROPHILS NFR BLD AUTO: 71 %
NRBC BLD AUTO-RTO: 0 %
OHS QRS DURATION: 78 MS
OHS QTC CALCULATION: 434 MS
PLATELET # BLD AUTO: 135 X10(3)/MCL (ref 130–400)
PMV BLD AUTO: 11.1 FL (ref 7.4–10.4)
POTASSIUM SERPL-SCNC: 4 MMOL/L (ref 3.5–5.1)
PROT SERPL-MCNC: 6.4 GM/DL (ref 5.8–7.6)
RBC # BLD AUTO: 3.61 X10(6)/MCL (ref 4.2–5.4)
SODIUM SERPL-SCNC: 138 MMOL/L (ref 136–145)
TROPONIN I SERPL-MCNC: <0.01 NG/ML (ref 0–0.04)
TROPONIN I SERPL-MCNC: <0.01 NG/ML (ref 0–0.04)
WBC # BLD AUTO: 3.65 X10(3)/MCL (ref 4.5–11.5)

## 2025-01-03 PROCEDURE — 96374 THER/PROPH/DIAG INJ IV PUSH: CPT

## 2025-01-03 PROCEDURE — 84484 ASSAY OF TROPONIN QUANT: CPT | Performed by: EMERGENCY MEDICINE

## 2025-01-03 PROCEDURE — 25500020 PHARM REV CODE 255: Performed by: EMERGENCY MEDICINE

## 2025-01-03 PROCEDURE — 93010 ELECTROCARDIOGRAM REPORT: CPT | Mod: ,,, | Performed by: INTERNAL MEDICINE

## 2025-01-03 PROCEDURE — 63600175 PHARM REV CODE 636 W HCPCS

## 2025-01-03 PROCEDURE — 25000003 PHARM REV CODE 250: Performed by: EMERGENCY MEDICINE

## 2025-01-03 PROCEDURE — 85025 COMPLETE CBC W/AUTO DIFF WBC: CPT | Performed by: EMERGENCY MEDICINE

## 2025-01-03 PROCEDURE — 25000242 PHARM REV CODE 250 ALT 637 W/ HCPCS: Performed by: EMERGENCY MEDICINE

## 2025-01-03 PROCEDURE — 80053 COMPREHEN METABOLIC PANEL: CPT | Performed by: EMERGENCY MEDICINE

## 2025-01-03 PROCEDURE — 93005 ELECTROCARDIOGRAM TRACING: CPT

## 2025-01-03 PROCEDURE — 99285 EMERGENCY DEPT VISIT HI MDM: CPT | Mod: 25

## 2025-01-03 PROCEDURE — 77386 HC IMRT, COMPLEX: CPT

## 2025-01-03 PROCEDURE — 83880 ASSAY OF NATRIURETIC PEPTIDE: CPT | Performed by: EMERGENCY MEDICINE

## 2025-01-03 RX ORDER — NITROGLYCERIN 0.4 MG/1
0.4 TABLET SUBLINGUAL EVERY 5 MIN PRN
Status: DISCONTINUED | OUTPATIENT
Start: 2025-01-03 | End: 2025-01-03 | Stop reason: HOSPADM

## 2025-01-03 RX ORDER — HYDROCODONE BITARTRATE AND ACETAMINOPHEN 5; 325 MG/1; MG/1
1 TABLET ORAL EVERY 6 HOURS PRN
Qty: 12 TABLET | Refills: 0 | Status: SHIPPED | OUTPATIENT
Start: 2025-01-03

## 2025-01-03 RX ORDER — ASPIRIN 325 MG
325 TABLET ORAL
Status: COMPLETED | OUTPATIENT
Start: 2025-01-03 | End: 2025-01-03

## 2025-01-03 RX ORDER — KETOROLAC TROMETHAMINE 30 MG/ML
15 INJECTION, SOLUTION INTRAMUSCULAR; INTRAVENOUS
Status: COMPLETED | OUTPATIENT
Start: 2025-01-03 | End: 2025-01-03

## 2025-01-03 RX ORDER — KETOROLAC TROMETHAMINE 30 MG/ML
INJECTION, SOLUTION INTRAMUSCULAR; INTRAVENOUS
Status: COMPLETED
Start: 2025-01-03 | End: 2025-01-03

## 2025-01-03 RX ADMIN — KETOROLAC TROMETHAMINE 15 MG: 30 INJECTION, SOLUTION INTRAMUSCULAR at 02:01

## 2025-01-03 RX ADMIN — IOHEXOL 75 ML: 350 INJECTION, SOLUTION INTRAVENOUS at 03:01

## 2025-01-03 RX ADMIN — ASPIRIN 325 MG ORAL TABLET 325 MG: 325 PILL ORAL at 01:01

## 2025-01-03 RX ADMIN — KETOROLAC TROMETHAMINE 15 MG: 30 INJECTION, SOLUTION INTRAMUSCULAR; INTRAVENOUS at 02:01

## 2025-01-03 RX ADMIN — NITROGLYCERIN 0.4 MG: 0.4 TABLET, ORALLY DISINTEGRATING SUBLINGUAL at 02:01
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
## 2025-01-06 ENCOUNTER — LAB VISIT (OUTPATIENT)
Dept: LAB | Facility: HOSPITAL | Age: 65
End: 2025-01-06
Attending: INTERNAL MEDICINE
Payer: MEDICARE

## 2025-01-06 ENCOUNTER — PATIENT MESSAGE (OUTPATIENT)
Dept: HEMATOLOGY/ONCOLOGY | Facility: CLINIC | Age: 65
End: 2025-01-06
Payer: MEDICARE

## 2025-01-06 DIAGNOSIS — C78.00: ICD-10-CM

## 2025-01-06 DIAGNOSIS — C34.11 MALIGNANT NEOPLASM OF UPPER LOBE OF RIGHT LUNG: ICD-10-CM

## 2025-01-06 LAB
ALBUMIN SERPL-MCNC: 3.6 G/DL (ref 3.4–4.8)
ALBUMIN/GLOB SERPL: 1.1 RATIO (ref 1.1–2)
ALP SERPL-CCNC: 56 UNIT/L (ref 40–150)
ALT SERPL-CCNC: 11 UNIT/L (ref 0–55)
ANION GAP SERPL CALC-SCNC: 6 MEQ/L
AST SERPL-CCNC: 15 UNIT/L (ref 5–34)
BASOPHILS # BLD AUTO: 0.02 X10(3)/MCL
BASOPHILS NFR BLD AUTO: 0.8 %
BILIRUB SERPL-MCNC: 0.5 MG/DL
BUN SERPL-MCNC: 16.2 MG/DL (ref 9.8–20.1)
CALCIUM SERPL-MCNC: 9.3 MG/DL (ref 8.4–10.2)
CHLORIDE SERPL-SCNC: 106 MMOL/L (ref 98–107)
CO2 SERPL-SCNC: 26 MMOL/L (ref 23–31)
CREAT SERPL-MCNC: 0.81 MG/DL (ref 0.55–1.02)
CREAT/UREA NIT SERPL: 20
EOSINOPHIL # BLD AUTO: 0 X10(3)/MCL (ref 0–0.9)
EOSINOPHIL NFR BLD AUTO: 0 %
ERYTHROCYTE [DISTWIDTH] IN BLOOD BY AUTOMATED COUNT: 13 % (ref 11.5–17)
GFR SERPLBLD CREATININE-BSD FMLA CKD-EPI: >60 ML/MIN/1.73/M2
GLOBULIN SER-MCNC: 3.3 GM/DL (ref 2.4–3.5)
GLUCOSE SERPL-MCNC: 88 MG/DL (ref 82–115)
HCT VFR BLD AUTO: 35.4 % (ref 37–47)
HGB BLD-MCNC: 11.9 G/DL (ref 12–16)
IMM GRANULOCYTES # BLD AUTO: 0 X10(3)/MCL (ref 0–0.04)
IMM GRANULOCYTES NFR BLD AUTO: 0 %
LYMPHOCYTES # BLD AUTO: 0.47 X10(3)/MCL (ref 0.6–4.6)
LYMPHOCYTES NFR BLD AUTO: 17.7 %
MAGNESIUM SERPL-MCNC: 2 MG/DL (ref 1.6–2.6)
MCH RBC QN AUTO: 32.9 PG (ref 27–31)
MCHC RBC AUTO-ENTMCNC: 33.6 G/DL (ref 33–36)
MCV RBC AUTO: 97.8 FL (ref 80–94)
MONOCYTES # BLD AUTO: 0.18 X10(3)/MCL (ref 0.1–1.3)
MONOCYTES NFR BLD AUTO: 6.8 %
NEUTROPHILS # BLD AUTO: 1.98 X10(3)/MCL (ref 2.1–9.2)
NEUTROPHILS NFR BLD AUTO: 74.7 %
PLATELET # BLD AUTO: 183 X10(3)/MCL (ref 130–400)
PMV BLD AUTO: 9 FL (ref 7.4–10.4)
POTASSIUM SERPL-SCNC: 4.4 MMOL/L (ref 3.5–5.1)
PROT SERPL-MCNC: 6.9 GM/DL (ref 5.8–7.6)
RBC # BLD AUTO: 3.62 X10(6)/MCL (ref 4.2–5.4)
SODIUM SERPL-SCNC: 138 MMOL/L (ref 136–145)
WBC # BLD AUTO: 2.65 X10(3)/MCL (ref 4.5–11.5)

## 2025-01-06 PROCEDURE — 85025 COMPLETE CBC W/AUTO DIFF WBC: CPT

## 2025-01-06 PROCEDURE — 80053 COMPREHEN METABOLIC PANEL: CPT

## 2025-01-06 PROCEDURE — 83735 ASSAY OF MAGNESIUM: CPT

## 2025-01-06 PROCEDURE — 36415 COLL VENOUS BLD VENIPUNCTURE: CPT

## 2025-01-06 PROCEDURE — 77386 HC IMRT, COMPLEX: CPT

## 2025-01-06 PROCEDURE — 77336 RADIATION PHYSICS CONSULT: CPT

## 2025-01-07 ENCOUNTER — INFUSION (OUTPATIENT)
Dept: INFUSION THERAPY | Facility: HOSPITAL | Age: 65
End: 2025-01-07
Attending: INTERNAL MEDICINE
Payer: MEDICARE

## 2025-01-07 VITALS
DIASTOLIC BLOOD PRESSURE: 56 MMHG | RESPIRATION RATE: 16 BRPM | SYSTOLIC BLOOD PRESSURE: 104 MMHG | TEMPERATURE: 98 F | HEART RATE: 99 BPM

## 2025-01-07 DIAGNOSIS — C34.11 MALIGNANT NEOPLASM OF UPPER LOBE OF RIGHT LUNG: Primary | ICD-10-CM

## 2025-01-07 PROCEDURE — 25000003 PHARM REV CODE 250: Performed by: NURSE PRACTITIONER

## 2025-01-07 PROCEDURE — 77386 HC IMRT, COMPLEX: CPT | Performed by: RADIOLOGY

## 2025-01-07 PROCEDURE — 96375 TX/PRO/DX INJ NEW DRUG ADDON: CPT

## 2025-01-07 PROCEDURE — 96413 CHEMO IV INFUSION 1 HR: CPT

## 2025-01-07 PROCEDURE — 63600175 PHARM REV CODE 636 W HCPCS: Performed by: NURSE PRACTITIONER

## 2025-01-07 PROCEDURE — 96417 CHEMO IV INFUS EACH ADDL SEQ: CPT

## 2025-01-07 PROCEDURE — 96367 TX/PROPH/DG ADDL SEQ IV INF: CPT

## 2025-01-07 RX ORDER — EPINEPHRINE 0.3 MG/.3ML
0.3 INJECTION SUBCUTANEOUS ONCE AS NEEDED
Status: DISCONTINUED | OUTPATIENT
Start: 2025-01-07 | End: 2025-01-07 | Stop reason: HOSPADM

## 2025-01-07 RX ORDER — DIPHENHYDRAMINE HYDROCHLORIDE 50 MG/ML
25 INJECTION INTRAMUSCULAR; INTRAVENOUS
Status: COMPLETED | OUTPATIENT
Start: 2025-01-07 | End: 2025-01-07

## 2025-01-07 RX ORDER — FAMOTIDINE 10 MG/ML
20 INJECTION INTRAVENOUS
Status: COMPLETED | OUTPATIENT
Start: 2025-01-07 | End: 2025-01-07

## 2025-01-07 RX ORDER — DIPHENHYDRAMINE HYDROCHLORIDE 50 MG/ML
50 INJECTION INTRAMUSCULAR; INTRAVENOUS ONCE AS NEEDED
Status: DISCONTINUED | OUTPATIENT
Start: 2025-01-07 | End: 2025-01-07 | Stop reason: HOSPADM

## 2025-01-07 RX ORDER — SODIUM CHLORIDE 0.9 % (FLUSH) 0.9 %
10 SYRINGE (ML) INJECTION
Status: DISCONTINUED | OUTPATIENT
Start: 2025-01-07 | End: 2025-01-07 | Stop reason: HOSPADM

## 2025-01-07 RX ORDER — HEPARIN 100 UNIT/ML
500 SYRINGE INTRAVENOUS
Status: DISCONTINUED | OUTPATIENT
Start: 2025-01-07 | End: 2025-01-07 | Stop reason: HOSPADM

## 2025-01-07 RX ADMIN — FAMOTIDINE 20 MG: 10 INJECTION, SOLUTION INTRAVENOUS at 09:01

## 2025-01-07 RX ADMIN — SODIUM CHLORIDE 0.25 MG: 9 INJECTION, SOLUTION INTRAVENOUS at 09:01

## 2025-01-07 RX ADMIN — CARBOPLATIN 190 MG: 10 INJECTION, SOLUTION INTRAVENOUS at 11:01

## 2025-01-07 RX ADMIN — PACLITAXEL 78 MG: 6 INJECTION, SOLUTION INTRAVENOUS at 10:01

## 2025-01-07 RX ADMIN — DIPHENHYDRAMINE HYDROCHLORIDE 25 MG: 50 INJECTION INTRAMUSCULAR; INTRAVENOUS at 09:01

## 2025-01-08 PROCEDURE — 77386 HC IMRT, COMPLEX: CPT | Performed by: RADIOLOGY

## 2025-01-09 PROCEDURE — 77386 HC IMRT, COMPLEX: CPT | Performed by: RADIOLOGY

## 2025-01-10 PROCEDURE — 77386 HC IMRT, COMPLEX: CPT | Performed by: RADIOLOGY

## 2025-01-13 ENCOUNTER — LAB VISIT (OUTPATIENT)
Dept: LAB | Facility: HOSPITAL | Age: 65
End: 2025-01-13
Attending: INTERNAL MEDICINE
Payer: MEDICARE

## 2025-01-13 DIAGNOSIS — C34.11 MALIGNANT NEOPLASM OF UPPER LOBE OF RIGHT LUNG: ICD-10-CM

## 2025-01-13 LAB
ALBUMIN SERPL-MCNC: 3.5 G/DL (ref 3.4–4.8)
ALBUMIN/GLOB SERPL: 1.1 RATIO (ref 1.1–2)
ALP SERPL-CCNC: 55 UNIT/L (ref 40–150)
ALT SERPL-CCNC: 12 UNIT/L (ref 0–55)
ANION GAP SERPL CALC-SCNC: 7 MEQ/L
AST SERPL-CCNC: 14 UNIT/L (ref 5–34)
BASOPHILS # BLD AUTO: 0.02 X10(3)/MCL
BASOPHILS NFR BLD AUTO: 0.8 %
BILIRUB SERPL-MCNC: 0.6 MG/DL
BUN SERPL-MCNC: 19.6 MG/DL (ref 9.8–20.1)
CALCIUM SERPL-MCNC: 9.4 MG/DL (ref 8.4–10.2)
CHLORIDE SERPL-SCNC: 103 MMOL/L (ref 98–107)
CO2 SERPL-SCNC: 28 MMOL/L (ref 23–31)
CREAT SERPL-MCNC: 0.82 MG/DL (ref 0.55–1.02)
CREAT/UREA NIT SERPL: 24
EOSINOPHIL # BLD AUTO: 0 X10(3)/MCL (ref 0–0.9)
EOSINOPHIL NFR BLD AUTO: 0 %
ERYTHROCYTE [DISTWIDTH] IN BLOOD BY AUTOMATED COUNT: 13.1 % (ref 11.5–17)
GFR SERPLBLD CREATININE-BSD FMLA CKD-EPI: >60 ML/MIN/1.73/M2
GLOBULIN SER-MCNC: 3.2 GM/DL (ref 2.4–3.5)
GLUCOSE SERPL-MCNC: 93 MG/DL (ref 82–115)
HCT VFR BLD AUTO: 34.1 % (ref 37–47)
HGB BLD-MCNC: 11.4 G/DL (ref 12–16)
IMM GRANULOCYTES # BLD AUTO: 0 X10(3)/MCL (ref 0–0.04)
IMM GRANULOCYTES NFR BLD AUTO: 0 %
LYMPHOCYTES # BLD AUTO: 0.46 X10(3)/MCL (ref 0.6–4.6)
LYMPHOCYTES NFR BLD AUTO: 18.8 %
MAGNESIUM SERPL-MCNC: 1.8 MG/DL (ref 1.6–2.6)
MCH RBC QN AUTO: 32.3 PG (ref 27–31)
MCHC RBC AUTO-ENTMCNC: 33.4 G/DL (ref 33–36)
MCV RBC AUTO: 96.6 FL (ref 80–94)
MONOCYTES # BLD AUTO: 0.25 X10(3)/MCL (ref 0.1–1.3)
MONOCYTES NFR BLD AUTO: 10.2 %
NEUTROPHILS # BLD AUTO: 1.72 X10(3)/MCL (ref 2.1–9.2)
NEUTROPHILS NFR BLD AUTO: 70.2 %
PLATELET # BLD AUTO: 136 X10(3)/MCL (ref 130–400)
PMV BLD AUTO: 9.4 FL (ref 7.4–10.4)
POTASSIUM SERPL-SCNC: 4.2 MMOL/L (ref 3.5–5.1)
PROT SERPL-MCNC: 6.7 GM/DL (ref 5.8–7.6)
RBC # BLD AUTO: 3.53 X10(6)/MCL (ref 4.2–5.4)
SODIUM SERPL-SCNC: 138 MMOL/L (ref 136–145)
WBC # BLD AUTO: 2.45 X10(3)/MCL (ref 4.5–11.5)

## 2025-01-13 PROCEDURE — 83735 ASSAY OF MAGNESIUM: CPT

## 2025-01-13 PROCEDURE — 77386 HC IMRT, COMPLEX: CPT | Performed by: RADIOLOGY

## 2025-01-13 PROCEDURE — 80053 COMPREHEN METABOLIC PANEL: CPT

## 2025-01-13 PROCEDURE — 77336 RADIATION PHYSICS CONSULT: CPT | Performed by: RADIOLOGY

## 2025-01-13 PROCEDURE — 85025 COMPLETE CBC W/AUTO DIFF WBC: CPT

## 2025-01-13 PROCEDURE — 36415 COLL VENOUS BLD VENIPUNCTURE: CPT

## 2025-01-13 RX ORDER — HEPARIN 100 UNIT/ML
500 SYRINGE INTRAVENOUS
Status: CANCELLED | OUTPATIENT
Start: 2025-01-14

## 2025-01-13 RX ORDER — EPINEPHRINE 0.3 MG/.3ML
0.3 INJECTION SUBCUTANEOUS ONCE AS NEEDED
Status: CANCELLED | OUTPATIENT
Start: 2025-01-14

## 2025-01-13 RX ORDER — SODIUM CHLORIDE 0.9 % (FLUSH) 0.9 %
10 SYRINGE (ML) INJECTION
Status: CANCELLED | OUTPATIENT
Start: 2025-01-14

## 2025-01-13 RX ORDER — FAMOTIDINE 10 MG/ML
20 INJECTION INTRAVENOUS
Status: CANCELLED | OUTPATIENT
Start: 2025-01-14

## 2025-01-13 RX ORDER — DIPHENHYDRAMINE HYDROCHLORIDE 50 MG/ML
50 INJECTION INTRAMUSCULAR; INTRAVENOUS ONCE AS NEEDED
Status: CANCELLED | OUTPATIENT
Start: 2025-01-14

## 2025-01-13 RX ORDER — DIPHENHYDRAMINE HYDROCHLORIDE 50 MG/ML
25 INJECTION INTRAMUSCULAR; INTRAVENOUS
Status: CANCELLED
Start: 2025-01-14

## 2025-01-14 ENCOUNTER — PATIENT MESSAGE (OUTPATIENT)
Dept: HEMATOLOGY/ONCOLOGY | Facility: CLINIC | Age: 65
End: 2025-01-14
Payer: MEDICARE

## 2025-01-14 ENCOUNTER — INFUSION (OUTPATIENT)
Dept: INFUSION THERAPY | Facility: HOSPITAL | Age: 65
End: 2025-01-14
Attending: INTERNAL MEDICINE
Payer: MEDICARE

## 2025-01-14 VITALS
WEIGHT: 143.31 LBS | BODY MASS INDEX: 26.37 KG/M2 | RESPIRATION RATE: 18 BRPM | DIASTOLIC BLOOD PRESSURE: 68 MMHG | SYSTOLIC BLOOD PRESSURE: 112 MMHG | OXYGEN SATURATION: 99 % | HEIGHT: 62 IN | TEMPERATURE: 98 F | HEART RATE: 88 BPM

## 2025-01-14 DIAGNOSIS — C34.11 MALIGNANT NEOPLASM OF UPPER LOBE OF RIGHT LUNG: Primary | ICD-10-CM

## 2025-01-14 PROCEDURE — 63600175 PHARM REV CODE 636 W HCPCS: Performed by: INTERNAL MEDICINE

## 2025-01-14 PROCEDURE — 96367 TX/PROPH/DG ADDL SEQ IV INF: CPT

## 2025-01-14 PROCEDURE — 96375 TX/PRO/DX INJ NEW DRUG ADDON: CPT

## 2025-01-14 PROCEDURE — 77386 HC IMRT, COMPLEX: CPT | Performed by: RADIOLOGY

## 2025-01-14 PROCEDURE — 96417 CHEMO IV INFUS EACH ADDL SEQ: CPT

## 2025-01-14 PROCEDURE — 25000003 PHARM REV CODE 250: Performed by: INTERNAL MEDICINE

## 2025-01-14 PROCEDURE — 96413 CHEMO IV INFUSION 1 HR: CPT

## 2025-01-14 PROCEDURE — A4216 STERILE WATER/SALINE, 10 ML: HCPCS | Performed by: INTERNAL MEDICINE

## 2025-01-14 RX ORDER — HEPARIN 100 UNIT/ML
500 SYRINGE INTRAVENOUS
Status: DISCONTINUED | OUTPATIENT
Start: 2025-01-14 | End: 2025-01-14 | Stop reason: HOSPADM

## 2025-01-14 RX ORDER — DIPHENHYDRAMINE HYDROCHLORIDE 50 MG/ML
25 INJECTION INTRAMUSCULAR; INTRAVENOUS
Status: COMPLETED | OUTPATIENT
Start: 2025-01-14 | End: 2025-01-14

## 2025-01-14 RX ORDER — FAMOTIDINE 10 MG/ML
20 INJECTION INTRAVENOUS
Status: COMPLETED | OUTPATIENT
Start: 2025-01-14 | End: 2025-01-14

## 2025-01-14 RX ORDER — EPINEPHRINE 0.3 MG/.3ML
0.3 INJECTION SUBCUTANEOUS ONCE AS NEEDED
Status: DISCONTINUED | OUTPATIENT
Start: 2025-01-14 | End: 2025-01-14 | Stop reason: HOSPADM

## 2025-01-14 RX ORDER — DIPHENHYDRAMINE HYDROCHLORIDE 50 MG/ML
50 INJECTION INTRAMUSCULAR; INTRAVENOUS ONCE AS NEEDED
Status: DISCONTINUED | OUTPATIENT
Start: 2025-01-14 | End: 2025-01-14 | Stop reason: HOSPADM

## 2025-01-14 RX ORDER — SODIUM CHLORIDE 0.9 % (FLUSH) 0.9 %
10 SYRINGE (ML) INJECTION
Status: DISCONTINUED | OUTPATIENT
Start: 2025-01-14 | End: 2025-01-14 | Stop reason: HOSPADM

## 2025-01-14 RX ADMIN — Medication 10 ML: at 11:01

## 2025-01-14 RX ADMIN — FAMOTIDINE 20 MG: 10 INJECTION, SOLUTION INTRAVENOUS at 10:01

## 2025-01-14 RX ADMIN — CARBOPLATIN 190 MG: 10 INJECTION, SOLUTION INTRAVENOUS at 11:01

## 2025-01-14 RX ADMIN — DIPHENHYDRAMINE HYDROCHLORIDE 25 MG: 50 INJECTION INTRAMUSCULAR; INTRAVENOUS at 10:01

## 2025-01-14 RX ADMIN — SODIUM CHLORIDE: 9 INJECTION, SOLUTION INTRAVENOUS at 09:01

## 2025-01-14 RX ADMIN — PALONOSETRON HYDROCHLORIDE 0.25 MG: 0.25 INJECTION INTRAVENOUS at 09:01

## 2025-01-14 RX ADMIN — PACLITAXEL 78 MG: 6 INJECTION, SOLUTION INTRAVENOUS at 10:01

## 2025-01-14 RX ADMIN — HEPARIN 500 UNITS: 100 SYRINGE at 11:01

## 2025-01-14 NOTE — PLAN OF CARE
Plan of care reviewed with the patient. C6 Taxol/Carbo given, tolerated well. Discharged in stable condition and is aware of future appointments.

## 2025-01-14 NOTE — TELEPHONE ENCOUNTER
Not sure why she got a text but probably because the schedulers do not know when her last chemotherapy will be. They just make appointments. Today is her last treatment.

## 2025-01-15 PROCEDURE — 77386 HC IMRT, COMPLEX: CPT | Performed by: RADIOLOGY

## 2025-01-16 PROCEDURE — 77386 HC IMRT, COMPLEX: CPT | Performed by: RADIOLOGY

## 2025-01-17 PROCEDURE — 77386 HC IMRT, COMPLEX: CPT

## 2025-01-20 PROCEDURE — 77386 HC IMRT, COMPLEX: CPT | Performed by: RADIOLOGY

## 2025-01-20 PROCEDURE — 77336 RADIATION PHYSICS CONSULT: CPT | Performed by: RADIOLOGY

## 2025-01-21 NOTE — PROGRESS NOTES
Subjective:       Patient ID: Valarie Rodriguez is a 64 y.o. female.    Pulmonologist: Dr. Zach Martinez    NSCLC Stage IIIA ([id number])--Diagnosed 24  Biopsy/pathology: Cervical mediastinoscopy done 24--multiple biopsies taken of 2R (upper paratracheal) LN and pathology showed poorly differentiated carcinoma, morphologically c/w NSCLC, c/w lung primary site, PD-L1 TPS 80%, CPS 85%, KRAS G12C mutation (approved therapies Adagrasib or Sotorasib), TMB 1.6 (low), MSI cannot be assessed.  Imagin. CT chest w/o contrast 23--7mm RUL spiculated nodule again seen and similar in appearance to comparison, suspicious for neoplasia, mediastinal lymph nodes similar in comparison, emphysema is noted.  2. CT chest w/o contrast done 2/15/24--stable RUL spiculated nodule, development of right hilar adenopathy, enlarged precarinal LN, increased in size compared to previous exam, PET/CT should be considered, emphysema.   3. CT chest w/o contrast 24--stable RUL spiculated nodule measures 7mm, stable small ground glass densities, enlarged precarinal LN measures 2.7X1.9cm, increased in size, right hilar LN vs. Mass measures 2.3X1.6cm, stable, PET should be considered, emphysema.   4. PET/CT done 24--hypermetabolic enlarged right paratracheal LN and prominent right hilum with slightly elevated activity c/w neoplasm, elevated activity of anterior thyroid bed and subcutaneous tissues, likely post-surgical, 7mm spiculated RUL nodule with adjacent vague opacity unchanged, and demonstrates no activity, bilateral L>R trochanteric bursitis, calcified uterine fibroid.   5. MRI brain w/ and w/o contrast done 24--no metastatic disease, mucous retention cyst vs, polyp in right maxillary antrum.  6. CT C/A/P 24--Upper normal to slightly enlarged single pretracheal mediastinal lymph node, nonspecific, bilaterally symmetric apical pleural based fibronodular scarring.  A somewhat irregular focal/nodular opacity in  the right apex noted measuring 8-9 mm, nonspecific, stable from the prior. Otherwise no evidence of malignancy.    Procedures:   Left subclavian mediport placed 12/2/24.    Treatment History:  Weekly Carboplatin/Paclitaxel + RT. Started on 12/10/24 (patient chose to delay treatment). Completed 6 cycles on 1/14/25. Completed XRT on 1/24/25.     Current treatment plan:   Durvalumab maintenance to start after scans.     Chief Complaint: 3 wk f/u    HPI  Patient presents for follow-up of NSCLC. She is doing fairly well. She completed 6 cycles of weekly Carbo/Taxol + XRT on 1/14/25. Reports no side effects aside from fatigue and constipation. Completed XRT on 1/24/25. Having some gas/heartburn and oral/throat pain. Dr. Anderson called in MMW and pain medications which are helping. Appetite decreased. No other problems reported today.     Past Medical History:   Diagnosis Date    Arthritis     Hyperlipidemia     Lymphadenopathy     Malignant neoplasm of upper lobe of right lung 09/13/2024    Pulmonary nodule     Thyroid disease       Past Surgical History:   Procedure Laterality Date    CHOLECYSTECTOMY      COLONOSCOPY      INSERTION OF TUNNELED CENTRAL VENOUS CATHETER (CVC) WITH SUBCUTANEOUS PORT N/A 12/2/2024    Procedure: [id number]-X-CATH;  Surgeon: Melony Collier MD;  Location: [facility] OR;  Service: Cardiothoracic;  Laterality: N/A;  MEDIPORT FOR CHEMO    MEDIASTINOSCOPY N/A 8/29/2024    Procedure: MEDIASTINOSCOPY;  Surgeon: Melony Collier MD;  Location: [facility] OR;  Service: Cardiothoracic;  Laterality: N/A;    THYROIDECTOMY, PARTIAL      TUBAL LIGATION       No family history on file.  Social History     Socioeconomic History    Marital status: Single    Years of education: 11   Tobacco Use    Smoking status: Former     Current packs/day: 0.10     Average packs/day: 0.5 packs/day for 35.2 years (17.5 ttl pk-yrs)     Types: Cigarettes     Start date: 11/19/2024     Quit date: 11/2024    Tobacco comments:     Pt  states that she quit 10/12/24.   Substance and Sexual Activity    Alcohol use: No     Alcohol/week: 0.0 standard drinks of alcohol    Drug use: No    Sexual activity: Yes     Partners: Male     Social Drivers of Health     Financial Resource Strain: Low Risk  (11/13/2024)    Overall Financial Resource Strain (CARDIA)     Difficulty of Paying Living Expenses: Not hard at all   Food Insecurity: No Food Insecurity (11/13/2024)    Hunger Vital Sign     Worried About Running Out of Food in the Last Year: Never true     Ran Out of Food in the Last Year: Never true   Physical Activity: Inactive (11/13/2024)    Exercise Vital Sign     Days of Exercise per Week: 0 days     Minutes of Exercise per Session: 0 min   Stress: Stress Concern Present (11/13/2024)    [employer] - Occupational Stress Questionnaire     Feeling of Stress : To some extent   Housing Stability: Unknown (11/13/2024)    Housing Stability Vital Sign     Unable to Pay for Housing in the Last Year: No       Review of patient's allergies indicates:  No Known Allergies   Current Outpatient Medications on File Prior to Visit   Medication Sig Dispense Refill    buPROPion (WELLBUTRIN XL) 300 MG 24 hr tablet Take 300 mg by mouth every morning.      cholecalciferol, vitamin D3, 1,250 mcg (50,000 unit) capsule Take 50,000 Units by mouth every 7 days.      HYDROcodone-acetaminophen (NORCO) 5-325 mg per tablet Take 1 tablet by mouth every 6 (six) hours as needed. 12 tablet 0    iron-vitamin C 100-250 mg, ICAR-C, 100-250 mg Tab Take 1 tablet by mouth every other day.      levothyroxine (SYNTHROID) 88 MCG tablet Take 88 mcg by mouth once daily.      LORazepam (ATIVAN) 1 MG tablet Take 1 mg by mouth every 12 (twelve) hours as needed for Anxiety.      meloxicam (MOBIC) 15 MG tablet Take 15 mg by mouth daily as needed for Pain.      ondansetron (ZOFRAN) 8 MG tablet Take 1 tablet (8 mg total) by mouth every 8 (eight) hours as needed for Nausea.  "20 tablet 1    prochlorperazine (COMPAZINE) 5 MG tablet Take 1 tablet (5 mg total) by mouth every 6 (six) hours as needed for Nausea. 20 tablet 1     No current facility-administered medications on file prior to visit.      Review of Systems   Constitutional:  Positive for appetite change and fatigue. Negative for unexpected weight change.   HENT:  Positive for sore throat. Negative for mouth sores.    Eyes:  Negative for visual disturbance.   Respiratory:  Negative for cough and shortness of breath.    Cardiovascular:  Negative for chest pain.   Gastrointestinal:  Positive for constipation and reflux. Negative for abdominal pain and diarrhea.   Genitourinary:  Negative for frequency.   Musculoskeletal:  Negative for back pain.   Integumentary:  Negative for rash.   Neurological:  Negative for headaches.   Hematological:  Negative for adenopathy.   Psychiatric/Behavioral:  The patient is not nervous/anxious.          Vitals:    01/27/25 0924   BP: 102/67   Pulse: 76   Resp: 18   Temp: 97.6 °F (36.4 °C)   TempSrc: Oral   SpO2: 100%   Weight: 64.1 kg (141 lb 4.8 oz)   Height: 5' 2" (1.575 m)     Physical Exam  Constitutional:       Appearance: Normal appearance. She is normal weight.   HENT:      Head: Normocephalic.      Comments: alopecia     Nose: Nose normal.      Mouth/Throat:      Mouth: Mucous membranes are moist.   Eyes:      Extraocular Movements: Extraocular movements intact.      Conjunctiva/sclera: Conjunctivae normal.   Cardiovascular:      Rate and Rhythm: Normal rate and regular rhythm.   Pulmonary:      Effort: Pulmonary effort is normal.      Breath sounds: Normal breath sounds.   Chest:      Comments: Anterior superior chest with incision from mediastinoscopy healed well; left chest wall mediport in place healed well  Abdominal:      General: Bowel sounds are normal. There is no distension.      Palpations: Abdomen is soft.      Tenderness: There is no abdominal tenderness.   Musculoskeletal:         " General: Normal range of motion.   Skin:     General: Skin is warm.   Neurological:      General: No focal deficit present.      Mental Status: She is alert and oriented to person, place, and time.   Psychiatric:         Mood and Affect: Mood normal.         Behavior: Behavior is cooperative.         Judgment: Judgment normal.       Lab Visit on 01/24/2025   Component Date Value    Sodium 01/24/2025 138     Potassium 01/24/2025 4.3     Chloride 01/24/2025 104     CO2 01/24/2025 26     Glucose 01/24/2025 79 (L)     Blood Urea Nitrogen 01/24/2025 16.4     Creatinine 01/24/2025 0.73     Calcium 01/24/2025 9.1     Protein Total 01/24/2025 6.7     Albumin 01/24/2025 3.4     Globulin 01/24/2025 3.3     Albumin/Globulin Ratio 01/24/2025 1.0 (L)     Bilirubin Total 01/24/2025 0.4     ALP 01/24/2025 65     ALT 01/24/2025 11     AST 01/24/2025 14     eGFR 01/24/2025 >60     Anion Gap 01/24/2025 8.0     BUN/Creatinine Ratio 01/24/2025 22     Magnesium Level 01/24/2025 2.00     WBC 01/24/2025 2.74 (L)     RBC 01/24/2025 2.79 (L)     Hgb 01/24/2025 9.1 (L)     Hct 01/24/2025 27.4 (L)     MCV 01/24/2025 98.2 (H)     MCH 01/24/2025 32.6 (H)     MCHC 01/24/2025 33.2     RDW 01/24/2025 14.0     Platelet 01/24/2025 107 (L)     MPV 01/24/2025 10.8 (H)     Neut % 01/24/2025 70.1     Lymph % 01/24/2025 18.6     Mono % 01/24/2025 10.9     Eos % 01/24/2025 0.0     Basophil % 01/24/2025 0.4     Imm Grans % 01/24/2025 0.0     Neut # 01/24/2025 1.92 (L)     Lymph # 01/24/2025 0.51 (L)     Mono # 01/24/2025 0.30     Eos # 01/24/2025 0.00     Baso # 01/24/2025 0.01     Imm Gran # 01/24/2025 0.00           Assessment:       1. 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 Secondary malignant neoplasm of mediastinum        Plan:       Patient with Stage IIIA Lung cancer, NSCLC TTF-1+, poorly differentiated, not specified if adenocarcinoma or  squamous cell carcinoma, although did confirm with pathology this is NSCLC. [employer] testing with PD-L1 85% and KRAS G12C mutation, diagnosed 8/29/24.   Primary tumor is either occult or may be the 7mm spiculated RUL mass. This was biopsied previously via bronchoscopy and reportedly negative. Will obtain records.  PET shows hypermetabolic right hilar LN and paratracheal LN, and the 7mm RUL lung nodule is not hypermetabolic, though too small to be evaluated by PET.  Brain MRI with no metastatic disease.   Case discussed with Dr. Kay.     Per NCCN guidelines, treatment recommended with definitive concurrent chemoradiation (Category 1) followed by Durvalumab maintenance.   Patient delayed her treatment for family trip and had 2nd opinion in [place] and agreed with plan.    Treatment started with concurrent RT and weekly Carboplatin/Paclitaxel on 12/10/24.   Completed 6 cycles on 1/14/25. Completed XRT on 1/24/25.   Recent labs with worsening anemia, Hgb 9.1 g/dL. Mild neutropenia continues with ANC 1920. Mild thrombocytopenia with platelets 107,000. CMP good.     Once she has completed chemo/RT, plan for repeat CT chest 4 weeks after and begin Durvalumab maintenance.   Will order CT of chest and have her return to discuss before starting Durvalumab maintenance. We discussed the most common side effects/adverse events.   Schedule for chemo education.     All questions answered at this time.   Smoking cessation strongly encouraged and she has quit!      Jalil Quiroga, [facility]    Visit today included increased complexity associated with the longitudinal care of the patient's chronic NSCLC.    Prior to the patient's arrival on the same day, I spent (5) minutes reviewing chart. Once in the exam room with the patient, I spent (20) minutes in the room with the member performing a history and exam as well as reviewing the test results and recommendations with the patient. After leaving the exam room, I spent an  additional (5) minutes completing the electronic health record. The total time spent that day caring for the member is (30) minutes, and this time - including the breakdown - is documented in the medical record.

## 2025-01-23 PROCEDURE — 77386 HC IMRT, COMPLEX: CPT | Performed by: RADIOLOGY

## 2025-01-24 ENCOUNTER — LAB VISIT (OUTPATIENT)
Dept: LAB | Facility: HOSPITAL | Age: 65
End: 2025-01-24
Attending: INTERNAL MEDICINE
Payer: MEDICARE

## 2025-01-24 DIAGNOSIS — C78.00: ICD-10-CM

## 2025-01-24 DIAGNOSIS — C34.11 MALIGNANT NEOPLASM OF UPPER LOBE OF RIGHT LUNG: ICD-10-CM

## 2025-01-24 LAB
ALBUMIN SERPL-MCNC: 3.4 G/DL (ref 3.4–4.8)
ALBUMIN/GLOB SERPL: 1 RATIO (ref 1.1–2)
ALP SERPL-CCNC: 65 UNIT/L (ref 40–150)
ALT SERPL-CCNC: 11 UNIT/L (ref 0–55)
ANION GAP SERPL CALC-SCNC: 8 MEQ/L
AST SERPL-CCNC: 14 UNIT/L (ref 5–34)
BASOPHILS # BLD AUTO: 0.01 X10(3)/MCL
BASOPHILS NFR BLD AUTO: 0.4 %
BILIRUB SERPL-MCNC: 0.4 MG/DL
BUN SERPL-MCNC: 16.4 MG/DL (ref 9.8–20.1)
CALCIUM SERPL-MCNC: 9.1 MG/DL (ref 8.4–10.2)
CHLORIDE SERPL-SCNC: 104 MMOL/L (ref 98–107)
CO2 SERPL-SCNC: 26 MMOL/L (ref 23–31)
CREAT SERPL-MCNC: 0.73 MG/DL (ref 0.55–1.02)
CREAT/UREA NIT SERPL: 22
EOSINOPHIL # BLD AUTO: 0 X10(3)/MCL (ref 0–0.9)
EOSINOPHIL NFR BLD AUTO: 0 %
ERYTHROCYTE [DISTWIDTH] IN BLOOD BY AUTOMATED COUNT: 14 % (ref 11.5–17)
GFR SERPLBLD CREATININE-BSD FMLA CKD-EPI: >60 ML/MIN/1.73/M2
GLOBULIN SER-MCNC: 3.3 GM/DL (ref 2.4–3.5)
GLUCOSE SERPL-MCNC: 79 MG/DL (ref 82–115)
HCT VFR BLD AUTO: 27.4 % (ref 37–47)
HGB BLD-MCNC: 9.1 G/DL (ref 12–16)
IMM GRANULOCYTES # BLD AUTO: 0 X10(3)/MCL (ref 0–0.04)
IMM GRANULOCYTES NFR BLD AUTO: 0 %
LYMPHOCYTES # BLD AUTO: 0.51 X10(3)/MCL (ref 0.6–4.6)
LYMPHOCYTES NFR BLD AUTO: 18.6 %
MAGNESIUM SERPL-MCNC: 2 MG/DL (ref 1.6–2.6)
MCH RBC QN AUTO: 32.6 PG (ref 27–31)
MCHC RBC AUTO-ENTMCNC: 33.2 G/DL (ref 33–36)
MCV RBC AUTO: 98.2 FL (ref 80–94)
MONOCYTES # BLD AUTO: 0.3 X10(3)/MCL (ref 0.1–1.3)
MONOCYTES NFR BLD AUTO: 10.9 %
NEUTROPHILS # BLD AUTO: 1.92 X10(3)/MCL (ref 2.1–9.2)
NEUTROPHILS NFR BLD AUTO: 70.1 %
PLATELET # BLD AUTO: 107 X10(3)/MCL (ref 130–400)
PMV BLD AUTO: 10.8 FL (ref 7.4–10.4)
POTASSIUM SERPL-SCNC: 4.3 MMOL/L (ref 3.5–5.1)
PROT SERPL-MCNC: 6.7 GM/DL (ref 5.8–7.6)
RBC # BLD AUTO: 2.79 X10(6)/MCL (ref 4.2–5.4)
SODIUM SERPL-SCNC: 138 MMOL/L (ref 136–145)
WBC # BLD AUTO: 2.74 X10(3)/MCL (ref 4.5–11.5)

## 2025-01-24 PROCEDURE — 83735 ASSAY OF MAGNESIUM: CPT

## 2025-01-24 PROCEDURE — 85025 COMPLETE CBC W/AUTO DIFF WBC: CPT

## 2025-01-24 PROCEDURE — 77386 HC IMRT, COMPLEX: CPT | Performed by: RADIOLOGY

## 2025-01-24 PROCEDURE — 80053 COMPREHEN METABOLIC PANEL: CPT

## 2025-01-24 PROCEDURE — 36415 COLL VENOUS BLD VENIPUNCTURE: CPT

## 2025-01-27 ENCOUNTER — OFFICE VISIT (OUTPATIENT)
Dept: HEMATOLOGY/ONCOLOGY | Facility: CLINIC | Age: 65
End: 2025-01-27
Payer: MEDICARE

## 2025-01-27 VITALS
HEART RATE: 76 BPM | HEIGHT: 62 IN | DIASTOLIC BLOOD PRESSURE: 67 MMHG | SYSTOLIC BLOOD PRESSURE: 102 MMHG | OXYGEN SATURATION: 100 % | BODY MASS INDEX: 26.01 KG/M2 | TEMPERATURE: 98 F | RESPIRATION RATE: 18 BRPM | WEIGHT: 141.31 LBS

## 2025-01-27 DIAGNOSIS — T45.1X5A IMMUNODEFICIENCY DUE TO CHEMOTHERAPY: ICD-10-CM

## 2025-01-27 DIAGNOSIS — Z79.899 IMMUNODEFICIENCY DUE TO CHEMOTHERAPY: ICD-10-CM
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
## 2025-01-27 DIAGNOSIS — C34.11 MALIGNANT NEOPLASM OF UPPER LOBE OF RIGHT LUNG: Primary | ICD-10-CM

## 2025-01-27 DIAGNOSIS — C78.1 SECONDARY MALIGNANT NEOPLASM OF MEDIASTINUM: ICD-10-CM

## 2025-01-27 DIAGNOSIS — D84.821 IMMUNODEFICIENCY DUE TO CHEMOTHERAPY: ICD-10-CM

## 2025-01-27 DIAGNOSIS — C34.90 MALIGNANT NEOPLASM OF UNSPECIFIED PART OF UNSPECIFIED BRONCHUS OR LUNG: ICD-10-CM

## 2025-01-27 DIAGNOSIS — D84.822 IMMUNODEFICIENCY SECONDARY TO RADIATION THERAPY: ICD-10-CM

## 2025-01-27 PROCEDURE — 3074F SYST BP LT 130 MM HG: CPT | Mod: CPTII,S$GLB,, | Performed by: NURSE PRACTITIONER

## 2025-01-27 PROCEDURE — 1159F MED LIST DOCD IN RCRD: CPT | Mod: CPTII,S$GLB,, | Performed by: NURSE PRACTITIONER

## 2025-01-27 PROCEDURE — 3288F FALL RISK ASSESSMENT DOCD: CPT | Mod: CPTII,S$GLB,, | Performed by: NURSE PRACTITIONER

## 2025-01-27 PROCEDURE — 99215 OFFICE O/P EST HI 40 MIN: CPT | Mod: S$GLB,,, | Performed by: NURSE PRACTITIONER

## 2025-01-27 PROCEDURE — G2211 COMPLEX E/M VISIT ADD ON: HCPCS | Mod: S$GLB,,, | Performed by: NURSE PRACTITIONER

## 2025-01-27 PROCEDURE — 99999 PR PBB SHADOW E&M-EST. PATIENT-LVL V: CPT | Mod: PBBFAC,,, | Performed by: NURSE PRACTITIONER

## 2025-01-27 PROCEDURE — 1126F AMNT PAIN NOTED NONE PRSNT: CPT | Mod: CPTII,S$GLB,, | Performed by: NURSE PRACTITIONER

## 2025-01-27 PROCEDURE — 3078F DIAST BP <80 MM HG: CPT | Mod: CPTII,S$GLB,, | Performed by: NURSE PRACTITIONER

## 2025-01-27 PROCEDURE — 1160F RVW MEDS BY RX/DR IN RCRD: CPT | Mod: CPTII,S$GLB,, | Performed by: NURSE PRACTITIONER

## 2025-01-27 PROCEDURE — 3008F BODY MASS INDEX DOCD: CPT | Mod: CPTII,S$GLB,, | Performed by: NURSE PRACTITIONER

## 2025-01-27 PROCEDURE — 1101F PT FALLS ASSESS-DOCD LE1/YR: CPT | Mod: CPTII,S$GLB,, | Performed by: NURSE PRACTITIONER

## 2025-02-05 ENCOUNTER — OFFICE VISIT (OUTPATIENT)
Dept: HEMATOLOGY/ONCOLOGY | Facility: CLINIC | Age: 65
End: 2025-02-05
Payer: MEDICARE

## 2025-02-05 ENCOUNTER — CLINICAL SUPPORT (OUTPATIENT)
Dept: HEMATOLOGY/ONCOLOGY | Facility: CLINIC | Age: 65
End: 2025-02-05
Payer: MEDICARE

## 2025-02-05 VITALS
WEIGHT: 143 LBS | HEART RATE: 91 BPM | TEMPERATURE: 98 F | DIASTOLIC BLOOD PRESSURE: 61 MMHG | BODY MASS INDEX: 26.31 KG/M2 | OXYGEN SATURATION: 99 % | HEIGHT: 62 IN | SYSTOLIC BLOOD PRESSURE: 101 MMHG

## 2025-02-05 DIAGNOSIS — C34.11 MALIGNANT NEOPLASM OF UPPER LOBE OF RIGHT LUNG: Primary | ICD-10-CM

## 2025-02-05 PROCEDURE — 3078F DIAST BP <80 MM HG: CPT | Mod: CPTII,S$GLB,,

## 2025-02-05 PROCEDURE — 1126F AMNT PAIN NOTED NONE PRSNT: CPT | Mod: CPTII,S$GLB,,

## 2025-02-05 PROCEDURE — 3074F SYST BP LT 130 MM HG: CPT | Mod: CPTII,S$GLB,,

## 2025-02-05 PROCEDURE — 1101F PT FALLS ASSESS-DOCD LE1/YR: CPT | Mod: CPTII,S$GLB,,

## 2025-02-05 PROCEDURE — 3008F BODY MASS INDEX DOCD: CPT | Mod: CPTII,S$GLB,,

## 2025-02-05 PROCEDURE — 99999 PR PBB SHADOW E&M-EST. PATIENT-LVL IV: CPT | Mod: PBBFAC,,,

## 2025-02-05 PROCEDURE — 99999 PR PBB SHADOW E&M-EST. PATIENT-LVL I: CPT | Mod: PBBFAC,,,

## 2025-02-05 PROCEDURE — 3288F FALL RISK ASSESSMENT DOCD: CPT | Mod: CPTII,S$GLB,,

## 2025-02-05 PROCEDURE — 99215 OFFICE O/P EST HI 40 MIN: CPT | Mod: S$GLB,,,

## 2025-02-05 PROCEDURE — 1159F MED LIST DOCD IN RCRD: CPT | Mod: CPTII,S$GLB,,

## 2025-02-05 RX ORDER — PREDNISONE 20 MG/1
TABLET ORAL
COMMUNITY
Start: 2025-01-28

## 2025-02-05 RX ORDER — FAMOTIDINE 20 MG/1
20 TABLET, FILM COATED ORAL 2 TIMES DAILY
COMMUNITY
Start: 2025-01-14

## 2025-02-05 RX ORDER — AMOXICILLIN AND CLAVULANATE POTASSIUM 875; 125 MG/1; MG/1
1 TABLET, FILM COATED ORAL EVERY 12 HOURS
COMMUNITY
Start: 2025-01-28

## 2025-02-05 NOTE — PROGRESS NOTES
THERAPY EDUCATION: DURVALUMAB    Subjective:      Patient ID: Valarie Rodriguez is a 64 y.o. female.      NSCLC Stage IIIA ([id number])--Diagnosed 24  Biopsy/pathology: Cervical mediastinoscopy done 24--multiple biopsies taken of 2R (upper paratracheal) LN and pathology showed poorly differentiated carcinoma, morphologically c/w NSCLC, c/w lung primary site, PD-L1 TPS 80%, CPS 85%, KRAS G12C mutation (approved therapies Adagrasib or Sotorasib), TMB 1.6 (low), MSI cannot be assessed.  Imagin. CT chest w/o contrast 23--7mm RUL spiculated nodule again seen and similar in appearance to comparison, suspicious for neoplasia, mediastinal lymph nodes similar in comparison, emphysema is noted.  2. CT chest w/o contrast done 2/15/24--stable RUL spiculated nodule, development of right hilar adenopathy, enlarged precarinal LN, increased in size compared to previous exam, PET/CT should be considered, emphysema.   3. CT chest w/o contrast 24--stable RUL spiculated nodule measures 7mm, stable small ground glass densities, enlarged precarinal LN measures 2.7X1.9cm, increased in size, right hilar LN vs. Mass measures 2.3X1.6cm, stable, PET should be considered, emphysema.   4. PET/CT done 24--hypermetabolic enlarged right paratracheal LN and prominent right hilum with slightly elevated activity c/w neoplasm, elevated activity of anterior thyroid bed and subcutaneous tissues, likely post-surgical, 7mm spiculated RUL nodule with adjacent vague opacity unchanged, and demonstrates no activity, bilateral L>R trochanteric bursitis, calcified uterine fibroid.   5. MRI brain w/ and w/o contrast done 24--no metastatic disease, mucous retention cyst vs, polyp in right maxillary antrum.  6. CT C/A/P 24--Upper normal to slightly enlarged single pretracheal mediastinal lymph node, nonspecific, bilaterally symmetric apical pleural based fibronodular scarring.  A somewhat irregular focal/nodular opacity in the  right apex noted measuring 8-9 mm, nonspecific, stable from the prior. Otherwise no evidence of malignancy.    Procedures:   Left subclavian mediport placed 12/2/24.    Treatment History:  Weekly Carboplatin/Paclitaxel + RT. Started on 12/10/24 (patient chose to delay treatment). Completed 6 cycles on 1/14/25. Completed XRT on 1/24/25.     Current treatment plan:   Durvalumab maintenance to start after scans.     Chief Complaint: Therapy Education     Interval History     2/5/25: Ms. Rodriguez presents to the clinic by herself for therapy education. Patient reports no major complaints at today's visit. Denies fever, chills, SOB, N/V/D, constipation, recent infection, chest pain or unexplained bleeding or bruising.      1/27/25:Patient presents for follow-up of NSCLC. She is doing fairly well. She completed 6 cycles of weekly Carbo/Taxol + XRT on 1/14/25. Reports no side effects aside from fatigue and constipation. Completed XRT on 1/24/25. Having some gas/heartburn and oral/throat pain. Dr. Anderson called in MMW and pain medications which are helping. Appetite decreased. No other problems reported today.     Past Medical History:   Diagnosis Date    Arthritis     Hyperlipidemia     Lymphadenopathy     Malignant neoplasm of upper lobe of right lung 09/13/2024    Pulmonary nodule     Thyroid disease       Past Surgical History:   Procedure Laterality Date    CHOLECYSTECTOMY      COLONOSCOPY      INSERTION OF TUNNELED CENTRAL VENOUS CATHETER (CVC) WITH SUBCUTANEOUS PORT N/A 12/2/2024    Procedure: [id number]-[id number];  Surgeon: Melony Collier MD;  Location: [facility] OR;  Service: Cardiothoracic;  Laterality: N/A;  MEDIPORT FOR CHEMO    MEDIASTINOSCOPY N/A 8/29/2024    Procedure: MEDIASTINOSCOPY;  Surgeon: Melony Collier MD;  Location: [facility] OR;  Service: Cardiothoracic;  Laterality: N/A;    THYROIDECTOMY, PARTIAL      TUBAL LIGATION       No family history on file.  Social History     Socioeconomic History    Marital  status: Single    Years of education: 11   Tobacco Use    Smoking status: Former     Current packs/day: 0.10     Average packs/day: 0.5 packs/day for 35.2 years (17.5 ttl pk-yrs)     Types: Cigarettes     Start date: 11/19/2024     Quit date: 11/2024    Tobacco comments:     Pt states that she quit 10/12/24.   Substance and Sexual Activity    Alcohol use: No     Alcohol/week: 0.0 standard drinks of alcohol    Drug use: No    Sexual activity: Yes     Partners: Male     Social Drivers of Health     Financial Resource Strain: Low Risk  (11/13/2024)    Overall Financial Resource Strain (CARDIA)     Difficulty of Paying Living Expenses: Not hard at all   Food Insecurity: No Food Insecurity (11/13/2024)    Hunger Vital Sign     Worried About Running Out of Food in the Last Year: Never true     Ran Out of Food in the Last Year: Never true   Physical Activity: Inactive (11/13/2024)    Exercise Vital Sign     Days of Exercise per Week: 0 days     Minutes of Exercise per Session: 0 min   Stress: Stress Concern Present (11/13/2024)    [employer] - Occupational Stress Questionnaire     Feeling of Stress : To some extent   Housing Stability: Unknown (11/13/2024)    Housing Stability Vital Sign     Unable to Pay for Housing in the Last Year: No       Review of patient's allergies indicates:  No Known Allergies   Current Outpatient Medications on File Prior to Visit   Medication Sig Dispense Refill    buPROPion (WELLBUTRIN XL) 300 MG 24 hr tablet Take 300 mg by mouth every morning.      cholecalciferol, vitamin D3, 1,250 mcg (50,000 unit) capsule Take 50,000 Units by mouth every 7 days.      HYDROcodone-acetaminophen (NORCO) 5-325 mg per tablet Take 1 tablet by mouth every 6 (six) hours as needed. 12 tablet 0    iron-vitamin C 100-250 mg, ICAR-C, 100-250 mg Tab Take 1 tablet by mouth every other day.      levothyroxine (SYNTHROID) 88 MCG tablet Take 88 mcg by mouth once daily.      LORazepam (ATIVAN)  "1 MG tablet Take 1 mg by mouth every 12 (twelve) hours as needed for Anxiety.      meloxicam (MOBIC) 15 MG tablet Take 15 mg by mouth daily as needed for Pain.      ondansetron (ZOFRAN) 8 MG tablet Take 1 tablet (8 mg total) by mouth every 8 (eight) hours as needed for Nausea. 20 tablet 1    prochlorperazine (COMPAZINE) 5 MG tablet Take 1 tablet (5 mg total) by mouth every 6 (six) hours as needed for Nausea. 20 tablet 1     No current facility-administered medications on file prior to visit.      Review of Systems   Constitutional:  Positive for appetite change and fatigue. Negative for unexpected weight change.   HENT:  Positive for sore throat. Negative for mouth sores.    Eyes:  Negative for visual disturbance.   Respiratory:  Negative for cough and shortness of breath.    Cardiovascular:  Negative for chest pain.   Gastrointestinal:  Positive for constipation and reflux. Negative for abdominal pain and diarrhea.   Genitourinary:  Negative for frequency.   Musculoskeletal:  Negative for back pain.   Integumentary:  Negative for rash.   Neurological:  Negative for headaches.   Hematological:  Negative for adenopathy.   Psychiatric/Behavioral:  The patient is not nervous/anxious.        Vitals:    02/05/25 0901   BP: 101/61   Pulse: 91   Temp: 98.4 °F (36.9 °C)   TempSrc: Oral   SpO2: 99%   Weight: 64.9 kg (143 lb)   Height: 5' 2" (1.575 m)         No visits with results within 1 Week(s) from this visit.   Latest known visit with results is:   Lab Visit on 01/24/2025   Component Date Value    Sodium 01/24/2025 138     Potassium 01/24/2025 4.3     Chloride 01/24/2025 104     CO2 01/24/2025 26     Glucose 01/24/2025 79 (L)     Blood Urea Nitrogen 01/24/2025 16.4     Creatinine 01/24/2025 0.73     Calcium 01/24/2025 9.1     Protein Total 01/24/2025 6.7     Albumin 01/24/2025 3.4     Globulin 01/24/2025 3.3     Albumin/Globulin Ratio 01/24/2025 1.0 (L)     Bilirubin Total 01/24/2025 0.4     ALP 01/24/2025 65     ALT " 01/24/2025 11     AST 01/24/2025 14     eGFR 01/24/2025 >60     Anion Gap 01/24/2025 8.0     BUN/Creatinine Ratio 01/24/2025 22     Magnesium Level 01/24/2025 2.00     WBC 01/24/2025 2.74 (L)     RBC 01/24/2025 2.79 (L)     Hgb 01/24/2025 9.1 (L)     Hct 01/24/2025 27.4 (L)     MCV 01/24/2025 98.2 (H)     MCH 01/24/2025 32.6 (H)     MCHC 01/24/2025 33.2     RDW 01/24/2025 14.0     Platelet 01/24/2025 107 (L)     MPV 01/24/2025 10.8 (H)     Neut % 01/24/2025 70.1     Lymph % 01/24/2025 18.6     Mono % 01/24/2025 10.9     Eos % 01/24/2025 0.0     Basophil % 01/24/2025 0.4     Imm Grans % 01/24/2025 0.0     Neut # 01/24/2025 1.92 (L)     Lymph # 01/24/2025 0.51 (L)     Mono # 01/24/2025 0.30     Eos # 01/24/2025 0.00     Baso # 01/24/2025 0.01     Imm Gran # 01/24/2025 0.00           Assessment:   NSCLC Stage IIIA ([id number])--Diagnosed 8/29/24  Plan:   -Therapy education completed on 2/5/25.  -Patient completed Carbo/Taxol on 1/14/25 and plans to start maintenance Durvalumab Q4W after repeat CT chest scheduled for 2/14/25.  -Mediport intact.  -Zofran PRN prescribed for at home with instructions to be taken by mouth every 8 hours as needed for nausea. If not working, Compazine prescribed as 2nd choice.    -OTC Imodium AD recommended for diarrhea (4-5 BMs a day). Take 2 tablets after the first loose bowel movement, and 1 tablet after each loose bowel movement after the first dose has been taken. No more than 4 tablets should be taken in any 24-hour period. If not working, Lomotil can be prescribed as 2nd choice.    -Emphasized adequate hand-hygiene and limited contact with people who are sick.   -Monitor and notify any bleeding in urine, stool, or sputum.  As well as unusual bleeding or bruising and stomach pain.   - Emphasized hydration with 4 16 oz bottles of water a day.    -Importance of moisturizing with fragrance free lotion to prevent skin rash.  -Call clinic if fever >100.4, shakes or chills, shortness of breath,  chest pain, uncontrolled vomiting or diarrhea, pain and tingling in the chest or arm, or just not feeling well.    -Plans to RTC on 2/14/25 for labs and toxicity check and discuss scans with MD on 2/18/25.    Discussion:    ·       A total of 60 minutes were spent in counseling today of which 100% was face-to-face.   At today's teaching session we discussed the patient's cancer diagnosis as well as planned immunotherapy regimen, protocol, side effects and toxicities.  Handouts of each agent was given and reviewed.    ·       We reviewed that side effects and toxicities typically occur after 4-10 weeks of treatment, and include but are not limited to:    Durvalumab Side Effects:  Allergic Reactions  Bone Pain  Breathing Problems  Chest Pain  Chills  Confusion  Constipation  Cough  Diarrhea  Dizziness  Dry Mouth  Dry Skin  Fever  Flushing  Headache  Injury  Itching  Muscle Pain  Nausea  Pain  Rash  Swelling  Weight Gain  Weight Loss    ·       Discussed the risk of immune-mediated pneumonitis, including interstitial lung disease.  Instructed to contact our office for new or worsening chest pain, severe cough or shortness of breath.    ·       Discussed the risk of immune-mediated colitis and diarrhea. Instructed to contact our office for diarrhea she cannot control or that results in =4 bowel movements per day.    ·       Discussed the risk of immune mediated hepatitis. Instructed to contact office for right upper quadrant pain, yellowing of skin or eyes, concentrated yellow urine, severe nausea and vomiting, or severe abdominal pain.    ·       Discussed the risk of immune-mediated nephritis. Instructed patient on possible symptoms of decreased urine output, blood in the urine or very dark urine, overall swelling, or very high blood pressure.    ·       Discussed the risk of immune-mediated cardiomyopathy, including symptoms of edema, including periorbital edema, generalized edema, peripheral edema, and pulmonary  edema.    ·       Discussed the risk of immune-mediated adrenal insufficiency or hypophysitis, including symptoms of headache, visual disturbances, fatigue, weight loss, and hypotension.    ·       Discussed immune-mediated thyroiditis, which can result in either hypothyroid or hyperthyroid. These will be managed according to symptoms, without a dose adjustment needed.    ·       Discussed the risk of rashes including an immune-mediated rash, Cisneros-Richard syndrome (SJS), and toxic epidermal necrolysis (TENS). Also possible are vitiligo/skin hypopigmentation.  Instructed to contact our office for any new onset moderate to severe rash.    ·       Discussed the risk of immune-mediated encephalitis, including symptoms of altered mental status, headache, fever, confusion, agitation or hallucinations, seizures, loss of sensation or paralysis, muscle weakness, double vision, problems with speech or hearing, or loss on consciousness.    ·       Discussed musculoskeletal side effects of muscle and joint pain.    ·       Discussed respiratory side effects of cough and dyspnea, increased risk of upper respiratory infections.    ·       Discussed GI side effects of nausea and vomiting, and to a lesser extent abdominal pain, decreased appetite, wt loss, and constipation.    ·       Discussed possible side effects of edema, including periorbital edema, generalized edema, peripheral edema, and pulmonary edema.    ·       Discussed general side effects of fatigue and fever.    ·       Discussed possible electrolyte abnormalities.    ·       Discussed possible hematologic toxicities.    ·       Instructed to contact our office for discussion of medication changes, vaccination, the addition of vitamin and/or herbal supplementation as they may interact with some immunotherapy agents.    ·       Discussed dietary modifications and the need to maintain adequate caloric intake and proper oral hydration.  Recommended at least 64-80 oz  of fluid per day.    ·       Discussed anti-emetic protocol and bowel regimen protocol.    ·       Office contact information given including after hours number.  Discussed there is an oncologist on call 24/7, 365 days, including weekends.  Provided phone numbers to use as necessary during business, Monday through Friday.    ·       A tour of our infusion room was given.    ·       In summary, the patient is in agreement with the treatment plan.  Questions appeared to be answered to their satisfaction. Consented the patient to the treatment plan and the patient was educated on the planned duration of the treatment and schedule of the treatment administration. Copy scanned into the chart.    All questions answered to the satisfaction of the patient and family.     Follow up appointments given to patient.       ANA MARÍA HERNANDEZ-FAREED  PATIENT EDUCATOR  [facility]

## 2025-02-05 NOTE — NURSING
Oncology Navigation   Intake  Date of Diagnosis: 24  Cancer Type: Thoracic  Type of Referral: External  Date of Referral: 24  Initial Nurse Navigator Contact: 24  Referral to Initial Contact Timeline (days): 1  First Appointment Available: 24  Appointment Date: 24  First Available Date vs. Scheduled Date (days): 0  Reason if booked > 7 days after scheduling: Transportation coordination; Patient request     Treatment  Current Status: Active       Medical Oncologist: Eduardo  Consult Date: 24  Chemotherapy: Completed  Chemotherapy Regimen: OP NSCLC PACLITAXEL + CARBOPLATIN (AUC) QW + RADIATION  Immunotherapy: Planned  Immunotherapy Name: DURVALUMAB    Radiation Therapy: Completed       General Referrals: Jack Harris          Support Systems: Children; Family members  Barriers of Care: Dietary / Nutrition  Dietary / Nutrition: Difficulty swallowing     Acuity  Stage: 2  Systemic Treatment - predicted or initiated: Immunotherapy (+2)  Treatment Tolerability: 1  ECO  Comorbidities in Medical History: 2  Hospitalization Within the Past Month: 0   Needed: 0  Support: 0  Verbalizes Financial Concerns: 0  Transportation: 0  Mental Health: PHQ Score: 0 (Distress 7/10)  History of noncompliance/frequent no shows and cancellations: 0  Verbalizes the need for more education: 0  Navigation Acuity: 7     Follow Up  No follow-ups on file.     Met with patient today following education visit. I have met with patient previously. Assessed for barriers to care. Patient denies transportation concerns. Her children are very supportive. She feels that she has everything she needs at home. She does feel that she is dealing with anxiety and depression. She feels that this is caused by issues swallowing after radiation and loss of appetite. RD will address needs with patient. I provided emotional support. She does not feel that she needs to meet with LCSW. Patient has no further questions or  concerns today. She is aware of how to reach navigation should she need to.

## 2025-02-05 NOTE — PROGRESS NOTES
"Oncology Nutrition Evaluation      Valarie Rodriguez   1960    Oncology Provider:   Jeannie Clark MD    Reason for Visit:  Change in Treatment Education    Oncology/Hematology Diagnosis:   NSCLC Stage IIIA ([id number])--Diagnosed 8/29/24     Treatment Plan:  Durvalumab     Treatment History:  Carboplatin/Paclitaxel (completed 1/14/25) + RT (completed 1/24/25)     Nutrition Recommendations:  1. Small frequent meals, high kcal/high protein  2. Ensure Clear BID (provides 240 kcal, 8 g protein/svg). Provided samples  3. Continue MMW per XRT  4. Consider OTC antacid such as Gaviscon liquid  5. RD to continue monitoring      Nutrition Assessment    2/5/25: This is a 64 y.o.female with a medical diagnosis of lung CA s/p completion of chemo/XRT, now planning maintenance immunotherapy. She reports about 5# wt loss over past few months. Notes "feeling something stuck" in her chest/esophagus when she swallows, occurring with both liquid and solid ingestion. She states it is not necessarily painful, just a "stuck" feeling. Discussed that this can be normal post-XRT and should improve.     Diet/PO Recall:   Appetite: good  Fluid Intake: sub-adequate  Diet Recall:  Breakfast: 1/2 c oatmeal  Lunch: 1/2 c oatmeal  Dinner: little to nothing  Snacks: none at this time  Drinks: water  Supplements: Ensure Clear once daily     Nutrition Factors Affecting Intake  difficulty/impaired swallowing    PMHx: HLD, arslan    Allergies: Patient has no known allergies.    Current Medications:    Current Outpatient Medications:     amoxicillin-clavulanate 875-125mg (AUGMENTIN) 875-125 mg per tablet, Take 1 tablet by mouth every 12 (twelve) hours., Disp: , Rfl:     buPROPion (WELLBUTRIN XL) 300 MG 24 hr tablet, Take 300 mg by mouth every morning., Disp: , Rfl:     cholecalciferol, vitamin D3, 1,250 mcg (50,000 unit) capsule, Take 50,000 Units by mouth every 7 days., Disp: , Rfl:     famotidine (PEPCID) 20 MG tablet, Take 20 mg by mouth 2 (two) times " "daily., Disp: , Rfl:     HYDROcodone-acetaminophen (NORCO) 5-325 mg per tablet, Take 1 tablet by mouth every 6 (six) hours as needed., Disp: 12 tablet, Rfl: 0    iron-vitamin C 100-250 mg, ICAR-C, 100-250 mg Tab, Take 1 tablet by mouth every other day., Disp: , Rfl:     levothyroxine (SYNTHROID) 88 MCG tablet, Take 88 mcg by mouth once daily., Disp: , Rfl:     LORazepam (ATIVAN) 1 MG tablet, Take 1 mg by mouth every 12 (twelve) hours as needed for Anxiety. (Patient not taking: Reported on 2/5/2025), Disp: , Rfl:     meloxicam (MOBIC) 15 MG tablet, Take 15 mg by mouth daily as needed for Pain., Disp: , Rfl:     ondansetron (ZOFRAN) 8 MG tablet, Take 1 tablet (8 mg total) by mouth every 8 (eight) hours as needed for Nausea., Disp: 20 tablet, Rfl: 1    predniSONE (DELTASONE) 20 MG tablet, Take by mouth., Disp: , Rfl:     prochlorperazine (COMPAZINE) 5 MG tablet, Take 1 tablet (5 mg total) by mouth every 6 (six) hours as needed for Nausea., Disp: 20 tablet, Rfl: 1    Labs: no new    Anthropometrics    Height:   Ht Readings from Last 1 Encounters:   02/05/25 5' 2" (1.575 m)      Weight:   Wt Readings from Last 1 Encounters:   02/05/25 64.9 kg (143 lb)        Usual Body Weight: 66.5 kg (146#)  % Weight Change: -2% in 1-2 months    BMI: 26.1 (overweight)    Ideal Weight: 50 kg (110 lb)  % Ideal Weight: 130%      Nutrition Diagnosis    PES: Swallowing difficulty related to chronic illness as evidenced by pt reports feeling of food/liquids stuck in esophagus/chest. (new)     Nutrition Risk  moderate    Nutrition Intervention    Interventions(treatment strategy):  modified composition of meals/snacks, commercial beverage, prescription medication, and collaboration with other providers      Nutrition Monitoring and Evaluation    Monitor: food and beverage intake and weight change    Follow up next clinic visit.        Kandy Anand, MS, RD, , LDN                                                                                  "

## 2025-02-11 NOTE — PROGRESS NOTES
Subjective:       Patient ID: Valarie Rodriguez is a 64 y.o. female.    Pulmonologist: Dr. Zach Martinez    NSCLC Stage IIIA ([id number])--Diagnosed 24  Biopsy/pathology: Cervical mediastinoscopy done 24--multiple biopsies taken of 2R (upper paratracheal) LN and pathology showed poorly differentiated carcinoma, morphologically c/w NSCLC, c/w lung primary site, PD-L1 TPS 80%, CPS 85%, KRAS G12C mutation (approved therapies Adagrasib or Sotorasib), TMB 1.6 (low), MSI cannot be assessed.  Imagin. CT chest w/o contrast 23--7mm RUL spiculated nodule again seen and similar in appearance to comparison, suspicious for neoplasia, mediastinal lymph nodes similar in comparison, emphysema is noted.  2. CT chest w/o contrast done 2/15/24--stable RUL spiculated nodule, development of right hilar adenopathy, enlarged precarinal LN, increased in size compared to previous exam, PET/CT should be considered, emphysema.   3. CT chest w/o contrast 24--stable RUL spiculated nodule measures 7mm, stable small ground glass densities, enlarged precarinal LN measures 2.7X1.9cm, increased in size, right hilar LN vs. Mass measures 2.3X1.6cm, stable, PET should be considered, emphysema.   4. PET/CT done 24--hypermetabolic enlarged right paratracheal LN and prominent right hilum with slightly elevated activity c/w neoplasm, elevated activity of anterior thyroid bed and subcutaneous tissues, likely post-surgical, 7mm spiculated RUL nodule with adjacent vague opacity unchanged, and demonstrates no activity, bilateral L>R trochanteric bursitis, calcified uterine fibroid.   5. MRI brain w/ and w/o contrast done 24--no metastatic disease, mucous retention cyst vs, polyp in right maxillary antrum.  6. CT C/A/P 24--Upper normal to slightly enlarged single pretracheal mediastinal lymph node, nonspecific, bilaterally symmetric apical pleural based fibronodular scarring.  A somewhat irregular focal/nodular opacity in  the right apex noted measuring 8-9 mm, nonspecific, stable from the prior. Otherwise no evidence of malignancy.  7. CTA chest done 1/3/25--stable 1.3 cm lymph node in the pretracheal region, stable 8 mm non-calcified nodule with spiculated margins located in the right lung apex, no acute process.  8. CT chest w/ contrast 2/14/25-- Stable appearance of subcentimeter spiculated right lung apex nodule. No interval changes to suggest new/worsened metastatic disease within the chest.    Procedures:   Left subclavian mediport placed 12/2/24.    Treatment History:  Weekly Carboplatin/Paclitaxel + RT. Started on 12/10/24 (patient chose to delay treatment). Completed 6 cycles on 1/14/25. Completed XRT on 1/24/25.     Current treatment plan:   Durvalumab maintenance to start 2/21/25.     Chief Complaint: Follow-up    HPI  Patient presents for follow-up of NSCLC.She is doing better. Eating has improved. No other new problems reported. CT scan recent is stable to improved and I went over results with patient.     Past Medical History:   Diagnosis Date    Arthritis     Hyperlipidemia     Lymphadenopathy     Malignant neoplasm of upper lobe of right lung 09/13/2024    Pulmonary nodule     Thyroid disease       Past Surgical History:   Procedure Laterality Date    CHOLECYSTECTOMY      COLONOSCOPY      INSERTION OF TUNNELED CENTRAL VENOUS CATHETER (CVC) WITH SUBCUTANEOUS PORT N/A 12/2/2024    Procedure: [id number];  Surgeon: Melony Collier MD;  Location: [facility];  Service: Cardiothoracic;  Laterality: N/A;  MEDIPORT FOR CHEMO    MEDIASTINOSCOPY N/A 8/29/2024    Procedure: MEDIASTINOSCOPY;  Surgeon: Melony Collier MD;  Location: [facility] OR;  Service: Cardiothoracic;  Laterality: N/A;    THYROIDECTOMY, PARTIAL      TUBAL LIGATION       No family history on file.  Social History     Socioeconomic History    Marital status: Single    Years of education: 11   Tobacco Use    Smoking status: Former     Current packs/day:  0.10     Average packs/day: 0.5 packs/day for 35.2 years (17.5 ttl pk-yrs)     Types: Cigarettes     Start date: 11/19/2024     Quit date: 11/2024    Tobacco comments:     Pt states that she quit 10/12/24.   Substance and Sexual Activity    Alcohol use: No     Alcohol/week: 0.0 standard drinks of alcohol    Drug use: No    Sexual activity: Yes     Partners: Male     Social Drivers of Health     Financial Resource Strain: Low Risk  (11/13/2024)    Overall Financial Resource Strain (CARDIA)     Difficulty of Paying Living Expenses: Not hard at all   Food Insecurity: No Food Insecurity (11/13/2024)    Hunger Vital Sign     Worried About Running Out of Food in the Last Year: Never true     Ran Out of Food in the Last Year: Never true   Physical Activity: Inactive (11/13/2024)    Exercise Vital Sign     Days of Exercise per Week: 0 days     Minutes of Exercise per Session: 0 min   Stress: Stress Concern Present (11/13/2024)    [employer] - Occupational Stress Questionnaire     Feeling of Stress : To some extent   Housing Stability: Unknown (11/13/2024)    Housing Stability Vital Sign     Unable to Pay for Housing in the Last Year: No       Review of patient's allergies indicates:  No Known Allergies   Current Outpatient Medications on File Prior to Visit   Medication Sig Dispense Refill    cholecalciferol, vitamin D3, 1,250 mcg (50,000 unit) capsule Take 50,000 Units by mouth every 7 days.      HYDROcodone-acetaminophen (NORCO) 5-325 mg per tablet Take 1 tablet by mouth every 6 (six) hours as needed. 12 tablet 0    levothyroxine (SYNTHROID) 88 MCG tablet Take 88 mcg by mouth once daily.      amoxicillin-clavulanate 875-125mg (AUGMENTIN) 875-125 mg per tablet Take 1 tablet by mouth every 12 (twelve) hours. (Patient not taking: Reported on 2/18/2025)      buPROPion (WELLBUTRIN XL) 300 MG 24 hr tablet Take 300 mg by mouth every morning. (Patient not taking: Reported on 2/18/2025)       "famotidine (PEPCID) 20 MG tablet Take 20 mg by mouth 2 (two) times daily. (Patient not taking: Reported on 2/18/2025)      iron-vitamin C 100-250 mg, ICAR-C, 100-250 mg Tab Take 1 tablet by mouth every other day. (Patient not taking: Reported on 2/18/2025)      LORazepam (ATIVAN) 1 MG tablet Take 1 mg by mouth every 12 (twelve) hours as needed for Anxiety. (Patient not taking: Reported on 2/5/2025)      meloxicam (MOBIC) 15 MG tablet Take 15 mg by mouth daily as needed for Pain. (Patient not taking: Reported on 2/18/2025)      ondansetron (ZOFRAN) 8 MG tablet Take 1 tablet (8 mg total) by mouth every 8 (eight) hours as needed for Nausea. (Patient not taking: Reported on 2/18/2025) 20 tablet 1    predniSONE (DELTASONE) 20 MG tablet Take by mouth. (Patient not taking: Reported on 2/18/2025)      prochlorperazine (COMPAZINE) 5 MG tablet Take 1 tablet (5 mg total) by mouth every 6 (six) hours as needed for Nausea. (Patient not taking: Reported on 2/18/2025) 20 tablet 1     No current facility-administered medications on file prior to visit.      Review of Systems   Constitutional:  Positive for appetite change and fatigue. Negative for unexpected weight change.   HENT:  Positive for sore throat. Negative for mouth sores.    Eyes:  Negative for visual disturbance.   Respiratory:  Negative for cough and shortness of breath.    Cardiovascular:  Negative for chest pain.   Gastrointestinal:  Positive for constipation and reflux. Negative for abdominal pain and diarrhea.   Genitourinary:  Negative for frequency.   Musculoskeletal:  Negative for back pain.   Integumentary:  Negative for rash.   Neurological:  Negative for headaches.   Hematological:  Negative for adenopathy.   Psychiatric/Behavioral:  The patient is not nervous/anxious.          Vitals:    02/18/25 0903   BP: 108/66   Pulse: 82   Resp: 14   Temp: 98.2 °F (36.8 °C)   TempSrc: Oral   SpO2: 100%   Weight: 65 kg (143 lb 3.2 oz)   Height: 5' 2" (1.575 m) "       Physical Exam  Constitutional:       Appearance: Normal appearance. She is normal weight.   HENT:      Head: Normocephalic.      Comments: alopecia     Nose: Nose normal.      Mouth/Throat:      Mouth: Mucous membranes are moist.   Eyes:      Extraocular Movements: Extraocular movements intact.      Conjunctiva/sclera: Conjunctivae normal.   Cardiovascular:      Rate and Rhythm: Normal rate and regular rhythm.   Pulmonary:      Effort: Pulmonary effort is normal.      Breath sounds: Normal breath sounds.   Chest:      Comments: Anterior superior chest with incision from mediastinoscopy healed well; left chest wall mediport in place healed well  Abdominal:      General: Bowel sounds are normal. There is no distension.      Palpations: Abdomen is soft.      Tenderness: There is no abdominal tenderness.   Musculoskeletal:         General: Normal range of motion.   Skin:     General: Skin is warm.   Neurological:      General: No focal deficit present.      Mental Status: She is alert and oriented to person, place, and time.   Psychiatric:         Mood and Affect: Mood normal.         Behavior: Behavior is cooperative.         Judgment: Judgment normal.       Lab Visit on 02/14/2025   Component Date Value    Sodium 02/14/2025 142     Potassium 02/14/2025 4.1     Chloride 02/14/2025 110 (H)     CO2 02/14/2025 26     Glucose 02/14/2025 86     Blood Urea Nitrogen 02/14/2025 13.8     Creatinine 02/14/2025 0.73     Calcium 02/14/2025 8.9     Protein Total 02/14/2025 6.8     Albumin 02/14/2025 3.3 (L)     Globulin 02/14/2025 3.5     Albumin/Globulin Ratio 02/14/2025 0.9 (L)     Bilirubin Total 02/14/2025 0.4     ALP 02/14/2025 62     ALT 02/14/2025 9     AST 02/14/2025 14     eGFR 02/14/2025 >60     Anion Gap 02/14/2025 6.0     BUN/Creatinine Ratio 02/14/2025 19     Magnesium Level 02/14/2025 2.00     TSH 02/14/2025 0.386     WBC 02/14/2025 3.37 (L)     RBC 02/14/2025 2.88 (L)     Hgb 02/14/2025 9.7 (L)     Hct  02/14/2025 29.8 (L)     MCV 02/14/2025 103.5 (H)     MCH 02/14/2025 33.7 (H)     MCHC 02/14/2025 32.6 (L)     RDW 02/14/2025 19.5 (H)     Platelet 02/14/2025 278     MPV 02/14/2025 9.3     Neut % 02/14/2025 68.5     Lymph % 02/14/2025 18.7     Mono % 02/14/2025 12.2     Eos % 02/14/2025 0.3     Basophil % 02/14/2025 0.3     Imm Grans % 02/14/2025 0.0     Neut # 02/14/2025 2.31     Lymph # 02/14/2025 0.63     Mono # 02/14/2025 0.41     Eos # 02/14/2025 0.01     Baso # 02/14/2025 0.01     Imm Gran # 02/14/2025 0.00           Assessment:       1. Malignant neoplasm of upper lobe of right lung    2. Secondary malignant neoplasm of mediastinum    3. 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 Immunodeficiency due to chemotherapy    5. Immunodeficiency secondary to radiation therapy    6. Antineoplastic chemotherapy induced anemia      Plan:       Patient with Stage IIIA Lung cancer, NSCLC TTF-1+, poorly differentiated, not specified if adenocarcinoma or squamous cell carcinoma, although did confirm with pathology this is NSCLC. [employer] testing with PD-L1 85% and KRAS G12C mutation, diagnosed 8/29/24.   Primary tumor is either occult or may be the 7mm spiculated RUL mass. This was biopsied previously via bronchoscopy and reportedly negative. Will obtain records.  PET shows hypermetabolic right hilar LN and paratracheal LN, and the 7mm RUL lung nodule is not hypermetabolic, though too small to be evaluated by PET.  Brain MRI with no metastatic disease.   Case discussed with Dr. Kay.     Per NCCN guidelines, treatment recommended with definitive concurrent chemoradiation (Category 1) followed by Durvalumab maintenance.   Patient delayed her treatment for family trip and had 2nd opinion in [place] and agreed with plan.    Treatment started with concurrent RT and weekly Carboplatin/Paclitaxel on 12/10/24.   Completed 6 cycles on 1/14/25. Completed XRT on 1/24/25.     CT chest w/ contrast done 2/14/25 with stable RUL nodule, no  mention of any enlarged lymph nodes, so assuming these are improved. Will clarify with radiologist.     Recent labs with slowly improving anemia, Hgb 9.7 g/dL. WBC better, neutropenia resolved, platelets normal. CMP and TSH good.     Discussed most common and more serious side effects of Durvalumab.  Plan to begin treatment on Friday 2/21/25.  Patient given information from [employer] on Durvalumab.    F/u in 4 weeks labs and toxicity check, prior to next dose due on 3/21/25.  Will obtain anemia work-up on RTC. She will restart her oral iron as well.     All questions answered at this time.   Smoking cessation strongly encouraged and she has quit!      Jeannie Clark MD    Visit today included increased complexity associated with the care of the episodic problem NSCLC, addressing and managing the longitudinal care of the patient's lung cancer.

## 2025-02-14 ENCOUNTER — HOSPITAL ENCOUNTER (OUTPATIENT)
Dept: RADIOLOGY | Facility: HOSPITAL | Age: 65
Discharge: HOME | End: 2025-02-14
Attending: NURSE PRACTITIONER
Payer: MEDICARE
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
## 2025-02-14 DIAGNOSIS — C78.1 SECONDARY MALIGNANT NEOPLASM OF MEDIASTINUM: ICD-10-CM

## 2025-02-14 DIAGNOSIS — C34.11 MALIGNANT NEOPLASM OF UPPER LOBE OF RIGHT LUNG: ICD-10-CM

## 2025-02-14 DIAGNOSIS — D84.822 IMMUNODEFICIENCY SECONDARY TO RADIATION THERAPY: ICD-10-CM

## 2025-02-14 PROCEDURE — 71260 CT THORAX DX C+: CPT | Mod: TC

## 2025-02-14 PROCEDURE — 25500020 PHARM REV CODE 255: Performed by: NURSE PRACTITIONER

## 2025-02-14 RX ADMIN — IOHEXOL 100 ML: 350 INJECTION, SOLUTION INTRAVENOUS at 10:02

## 2025-02-18 ENCOUNTER — CLINICAL SUPPORT (OUTPATIENT)
Dept: HEMATOLOGY/ONCOLOGY | Facility: CLINIC | Age: 65
End: 2025-02-18
Payer: MEDICARE

## 2025-02-18 ENCOUNTER — OFFICE VISIT (OUTPATIENT)
Dept: HEMATOLOGY/ONCOLOGY | Facility: CLINIC | Age: 65
End: 2025-02-18
Payer: MEDICARE

## 2025-02-18 VITALS
BODY MASS INDEX: 26.35 KG/M2 | HEART RATE: 82 BPM | DIASTOLIC BLOOD PRESSURE: 66 MMHG | SYSTOLIC BLOOD PRESSURE: 108 MMHG | OXYGEN SATURATION: 100 % | TEMPERATURE: 98 F | HEIGHT: 62 IN | RESPIRATION RATE: 14 BRPM | WEIGHT: 143.19 LBS

## 2025-02-18 DIAGNOSIS — E03.2 HYPOTHYROIDISM DUE TO DRUGS: ICD-10-CM

## 2025-02-18 DIAGNOSIS — T45.1X5A ANTINEOPLASTIC CHEMOTHERAPY INDUCED ANEMIA: ICD-10-CM

## 2025-02-18 DIAGNOSIS — C34.11 MALIGNANT NEOPLASM OF UPPER LOBE OF RIGHT LUNG: Primary | ICD-10-CM

## 2025-02-18 DIAGNOSIS — Z79.899 IMMUNODEFICIENCY DUE TO CHEMOTHERAPY: ICD-10-CM
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
## 2025-02-18 DIAGNOSIS — T45.1X5A IMMUNODEFICIENCY DUE TO CHEMOTHERAPY: ICD-10-CM

## 2025-02-18 DIAGNOSIS — D84.822 IMMUNODEFICIENCY SECONDARY TO RADIATION THERAPY: ICD-10-CM

## 2025-02-18 DIAGNOSIS — D84.821 IMMUNODEFICIENCY DUE TO CHEMOTHERAPY: ICD-10-CM

## 2025-02-18 DIAGNOSIS — C78.1 SECONDARY MALIGNANT NEOPLASM OF MEDIASTINUM: ICD-10-CM

## 2025-02-18 DIAGNOSIS — D64.81 ANTINEOPLASTIC CHEMOTHERAPY INDUCED ANEMIA: ICD-10-CM

## 2025-02-18 NOTE — PROGRESS NOTES
"Oncology Nutrition Evaluation      Valarie Rodriguez   1960    Oncology Provider:   Jeannie Clark MD    Reason for Visit:  Nutrition Follow-Up    Oncology/Hematology Diagnosis:   NSCLC Stage IIIA ([id number])--Diagnosed 8/29/24     Treatment Plan:  Durvalumab     Treatment History:  Carboplatin/Paclitaxel (completed 1/14/25) + RT (completed 1/24/25)      Nutrition Recommendations:  1. Regular diet as tolerated    Nutrition Assessment    2/5/25: This is a 64 y.o.female with a medical diagnosis of lung CA s/p completion of chemo/XRT, now planning maintenance immunotherapy. She reports about 5# wt loss over past few months. Notes "feeling something stuck" in her chest/esophagus when she swallows, occurring with both liquid and solid ingestion. She states it is not necessarily painful, just a "stuck" feeling. Discussed that this can be normal post-XRT and should improve.     2/18/25: pt here for MD f/u. Wt is stable, she reports good appetite and eating is better. No new complaints.    Nutrition Factors Affecting Intake  none identified    PMHx: HLD, arslan     Allergies: Patient has no known allergies.    Current Medications:  Current Medications[1]    Labs: no new    Anthropometrics    Height:   Ht Readings from Last 1 Encounters:   02/18/25 5' 2" (1.575 m)      Weight:   Wt Readings from Last 1 Encounters:   02/18/25 65 kg (143 lb 3.2 oz)        Usual Body Weight: 66.5 kg (146#)   % Weight Change: -2% in 1-2 months     BMI: 26.19 (overweight)    Ideal Weight: 50 kg (110 lb)  % Ideal Weight: 130%       Nutrition Diagnosis    PES: Swallowing difficulty related to chronic illness as evidenced by pt reports feeling of food/liquids stuck in esophagus/chest. (resolved)     Nutrition Risk  low    Nutrition Intervention    Interventions(treatment strategy):  general/healthful diet        Nutrition Monitoring and Evaluation    Ongoing monitoring not warranted at this time. Please consult RD prn.        Kandy Anand MS, RD, " , MARIA ISABELN                                                                                                                                                                                                                                                                                       [1]   Current Outpatient Medications:     amoxicillin-clavulanate 875-125mg (AUGMENTIN) 875-125 mg per tablet, Take 1 tablet by mouth every 12 (twelve) hours. (Patient not taking: Reported on 2/18/2025), Disp: , Rfl:     buPROPion (WELLBUTRIN XL) 300 MG 24 hr tablet, Take 300 mg by mouth every morning. (Patient not taking: Reported on 2/18/2025), Disp: , Rfl:     cholecalciferol, vitamin D3, 1,250 mcg (50,000 unit) capsule, Take 50,000 Units by mouth every 7 days., Disp: , Rfl:     famotidine (PEPCID) 20 MG tablet, Take 20 mg by mouth 2 (two) times daily. (Patient not taking: Reported on 2/18/2025), Disp: , Rfl:     HYDROcodone-acetaminophen (NORCO) 5-325 mg per tablet, Take 1 tablet by mouth every 6 (six) hours as needed., Disp: 12 tablet, Rfl: 0    iron-vitamin C 100-250 mg, ICAR-C, 100-250 mg Tab, Take 1 tablet by mouth every other day. (Patient not taking: Reported on 2/18/2025), Disp: , Rfl:     levothyroxine (SYNTHROID) 88 MCG tablet, Take 88 mcg by mouth once daily., Disp: , Rfl:     LORazepam (ATIVAN) 1 MG tablet, Take 1 mg by mouth every 12 (twelve) hours as needed for Anxiety. (Patient not taking: Reported on 2/5/2025), Disp: , Rfl:     meloxicam (MOBIC) 15 MG tablet, Take 15 mg by mouth daily as needed for Pain. (Patient not taking: Reported on 2/18/2025), Disp: , Rfl:     ondansetron (ZOFRAN) 8 MG tablet, Take 1 tablet (8 mg total) by mouth every 8 (eight) hours as needed for Nausea. (Patient not taking: Reported on 2/18/2025), Disp: 20 tablet, Rfl: 1    predniSONE (DELTASONE) 20 MG tablet, Take by mouth. (Patient not taking: Reported on 2/18/2025), Disp: , Rfl:     prochlorperazine (COMPAZINE) 5 MG tablet, Take 1 tablet  (5 mg total) by mouth every 6 (six) hours as needed for Nausea. (Patient not taking: Reported on 2/18/2025), Disp: 20 tablet, Rfl: 1

## 2025-02-19 RX ORDER — SODIUM CHLORIDE 0.9 % (FLUSH) 0.9 %
10 SYRINGE (ML) INJECTION
Status: CANCELLED | OUTPATIENT
Start: 2025-02-21

## 2025-02-19 RX ORDER — EPINEPHRINE 0.3 MG/.3ML
0.3 INJECTION SUBCUTANEOUS ONCE AS NEEDED
Status: CANCELLED | OUTPATIENT
Start: 2025-02-21

## 2025-02-19 RX ORDER — HEPARIN 100 UNIT/ML
500 SYRINGE INTRAVENOUS
Status: CANCELLED | OUTPATIENT
Start: 2025-02-21

## 2025-02-19 RX ORDER — DIPHENHYDRAMINE HYDROCHLORIDE 50 MG/ML
50 INJECTION INTRAMUSCULAR; INTRAVENOUS ONCE AS NEEDED
Status: CANCELLED | OUTPATIENT
Start: 2025-02-21

## 2025-02-21 ENCOUNTER — INFUSION (OUTPATIENT)
Dept: INFUSION THERAPY | Facility: HOSPITAL | Age: 65
End: 2025-02-21
Attending: INTERNAL MEDICINE
Payer: MEDICARE

## 2025-02-21 VITALS
DIASTOLIC BLOOD PRESSURE: 68 MMHG | HEART RATE: 80 BPM | SYSTOLIC BLOOD PRESSURE: 114 MMHG | WEIGHT: 142 LBS | BODY MASS INDEX: 25.97 KG/M2 | TEMPERATURE: 97 F | OXYGEN SATURATION: 99 %

## 2025-02-21 DIAGNOSIS — C34.11 MALIGNANT NEOPLASM OF UPPER LOBE OF RIGHT LUNG: Primary | ICD-10-CM

## 2025-02-21 PROCEDURE — 96365 THER/PROPH/DIAG IV INF INIT: CPT

## 2025-02-21 PROCEDURE — 25000003 PHARM REV CODE 250: Performed by: INTERNAL MEDICINE

## 2025-02-21 PROCEDURE — 63600175 PHARM REV CODE 636 W HCPCS: Mod: JZ,TB | Performed by: INTERNAL MEDICINE

## 2025-02-21 RX ORDER — EPINEPHRINE 0.3 MG/.3ML
0.3 INJECTION SUBCUTANEOUS ONCE AS NEEDED
Status: DISCONTINUED | OUTPATIENT
Start: 2025-02-21 | End: 2025-02-21 | Stop reason: HOSPADM

## 2025-02-21 RX ORDER — SODIUM CHLORIDE 0.9 % (FLUSH) 0.9 %
10 SYRINGE (ML) INJECTION
Status: DISCONTINUED | OUTPATIENT
Start: 2025-02-21 | End: 2025-02-21 | Stop reason: HOSPADM

## 2025-02-21 RX ORDER — HEPARIN 100 UNIT/ML
500 SYRINGE INTRAVENOUS
Status: DISCONTINUED | OUTPATIENT
Start: 2025-02-21 | End: 2025-02-21 | Stop reason: HOSPADM

## 2025-02-21 RX ORDER — DIPHENHYDRAMINE HYDROCHLORIDE 50 MG/ML
50 INJECTION INTRAMUSCULAR; INTRAVENOUS ONCE AS NEEDED
Status: DISCONTINUED | OUTPATIENT
Start: 2025-02-21 | End: 2025-02-21 | Stop reason: HOSPADM

## 2025-02-21 RX ADMIN — SODIUM CHLORIDE 1500 MG: 9 INJECTION, SOLUTION INTRAVENOUS at 08:02

## 2025-02-21 RX ADMIN — HEPARIN 500 UNITS: 100 SYRINGE at 09:02

## 2025-03-11 NOTE — PROGRESS NOTES
Subjective:       Patient ID: Valarie Rodriguez is a 64 y.o. female.    Pulmonologist: Dr. Zach Martinez    NSCLC Stage IIIA ([id number])--Diagnosed 24  Biopsy/pathology: Cervical mediastinoscopy done 24--multiple biopsies taken of 2R (upper paratracheal) LN and pathology showed poorly differentiated carcinoma, morphologically c/w NSCLC, c/w lung primary site, PD-L1 TPS 80%, CPS 85%, KRAS G12C mutation (approved therapies Adagrasib or Sotorasib), TMB 1.6 (low), MSI cannot be assessed.  Imagin. CT chest w/o contrast 23--7mm RUL spiculated nodule again seen and similar in appearance to comparison, suspicious for neoplasia, mediastinal lymph nodes similar in comparison, emphysema is noted.  2. CT chest w/o contrast done 2/15/24--stable RUL spiculated nodule, development of right hilar adenopathy, enlarged precarinal LN, increased in size compared to previous exam, PET/CT should be considered, emphysema.   3. CT chest w/o contrast 24--stable RUL spiculated nodule measures 7mm, stable small ground glass densities, enlarged precarinal LN measures 2.7X1.9cm, increased in size, right hilar LN vs. Mass measures 2.3X1.6cm, stable, PET should be considered, emphysema.   4. PET/CT done 24--hypermetabolic enlarged right paratracheal LN and prominent right hilum with slightly elevated activity c/w neoplasm, elevated activity of anterior thyroid bed and subcutaneous tissues, likely post-surgical, 7mm spiculated RUL nodule with adjacent vague opacity unchanged, and demonstrates no activity, bilateral L>R trochanteric bursitis, calcified uterine fibroid.   5. MRI brain w/ and w/o contrast done 24--no metastatic disease, mucous retention cyst vs, polyp in right maxillary antrum.  6. CT C/A/P 24--Upper normal to slightly enlarged single pretracheal mediastinal lymph node, nonspecific, bilaterally symmetric apical pleural based fibronodular scarring.  A somewhat irregular focal/nodular opacity in  the right apex noted measuring 8-9 mm, nonspecific, stable from the prior. Otherwise no evidence of malignancy.  7. CTA chest done 1/3/25--stable 1.3 cm lymph node in the pretracheal region, stable 8 mm non-calcified nodule with spiculated margins located in the right lung apex, no acute process.  8. CT chest w/ contrast 2/14/25-- Stable appearance of subcentimeter spiculated right lung apex nodule. No interval changes to suggest new/worsened metastatic disease within the chest.    Procedures:   Left subclavian mediport placed 12/2/24.    Treatment History:  Weekly Carboplatin/Paclitaxel + RT. Started on 12/10/24 (patient chose to delay treatment). Completed 6 cycles on 1/14/25. Completed XRT on 1/24/25.     Current treatment plan:   Durvalumab maintenance. Started on 2/21/25.   Cycle 2 due on 3/21/25.     Chief Complaint: Other Misc (Pt reports no concerns today./)    HPI  Patient presents for follow-up of NSCLC. She is doing better. No other new problems reported. Started Durvalumab maintenance last month. Cycle 2 due later this week. Appetite good and weight stable. Denies any n/v/c/d. States she restarted her oral iron at her last appointment.     Past Medical History:   Diagnosis Date    Arthritis     Hyperlipidemia     Lymphadenopathy     Malignant neoplasm of upper lobe of right lung 09/13/2024    Pulmonary nodule     Thyroid disease       Past Surgical History:   Procedure Laterality Date    CHOLECYSTECTOMY      COLONOSCOPY      INSERTION OF TUNNELED CENTRAL VENOUS CATHETER (CVC) WITH SUBCUTANEOUS PORT N/A 12/2/2024    Procedure: [id number];  Surgeon: Melony Collier MD;  Location: [facility] OR;  Service: Cardiothoracic;  Laterality: N/A;  MEDIPORT FOR CHEMO    MEDIASTINOSCOPY N/A 8/29/2024    Procedure: MEDIASTINOSCOPY;  Surgeon: Melony Collier MD;  Location: [facility] OR;  Service: Cardiothoracic;  Laterality: N/A;    THYROIDECTOMY, PARTIAL      TUBAL LIGATION       No family history on file.  Social  History     Socioeconomic History    Marital status: Single    Years of education: 11   Tobacco Use    Smoking status: Former     Current packs/day: 0.10     Average packs/day: 0.5 packs/day for 35.3 years (17.5 ttl pk-yrs)     Types: Cigarettes     Start date: 11/19/2024     Quit date: 11/2024    Tobacco comments:     Pt states that she quit 10/12/24.   Substance and Sexual Activity    Alcohol use: No     Alcohol/week: 0.0 standard drinks of alcohol    Drug use: No    Sexual activity: Yes     Partners: Male     Social Drivers of Health     Financial Resource Strain: Low Risk  (11/13/2024)    Overall Financial Resource Strain (CARDIA)     Difficulty of Paying Living Expenses: Not hard at all   Food Insecurity: No Food Insecurity (11/13/2024)    Hunger Vital Sign     Worried About Running Out of Food in the Last Year: Never true     Ran Out of Food in the Last Year: Never true   Physical Activity: Inactive (11/13/2024)    Exercise Vital Sign     Days of Exercise per Week: 0 days     Minutes of Exercise per Session: 0 min   Stress: Stress Concern Present (11/13/2024)    [employer] - Occupational Stress Questionnaire     Feeling of Stress : To some extent   Housing Stability: Unknown (11/13/2024)    Housing Stability Vital Sign     Unable to Pay for Housing in the Last Year: No       Review of patient's allergies indicates:  No Known Allergies   Current Outpatient Medications on File Prior to Visit   Medication Sig Dispense Refill    cholecalciferol, vitamin D3, 1,250 mcg (50,000 unit) capsule Take 50,000 Units by mouth every 7 days.      HYDROcodone-acetaminophen (NORCO) 7.5-325 mg per tablet Take 1 tablet by mouth 4 (four) times daily as needed.      iron-vitamin C 100-250 mg, ICAR-C, 100-250 mg Tab Take 1 tablet by mouth every other day.      levothyroxine (SYNTHROID) 88 MCG tablet Take 88 mcg by mouth once daily.      [DISCONTINUED] HYDROcodone-acetaminophen (NORCO) 5-325 mg per tablet  Take 1 tablet by mouth every 6 (six) hours as needed. (Patient taking differently: Take 1 tablet by mouth every 6 (six) hours as needed. Pt states that she takes QHS to sleep.) 12 tablet 0    buPROPion (WELLBUTRIN XL) 300 MG 24 hr tablet Take 300 mg by mouth every morning. (Patient not taking: Reported on 2/18/2025)      LORazepam (ATIVAN) 1 MG tablet Take 1 mg by mouth every 12 (twelve) hours as needed for Anxiety. (Patient not taking: Reported on 2/5/2025)      meloxicam (MOBIC) 15 MG tablet Take 15 mg by mouth daily as needed for Pain. (Patient not taking: Reported on 2/18/2025)      ondansetron (ZOFRAN) 8 MG tablet Take 1 tablet (8 mg total) by mouth every 8 (eight) hours as needed for Nausea. (Patient not taking: Reported on 3/18/2025) 20 tablet 1    [DISCONTINUED] famotidine (PEPCID) 20 MG tablet Take 20 mg by mouth 2 (two) times daily. (Patient not taking: Reported on 3/18/2025)      [DISCONTINUED] predniSONE (DELTASONE) 20 MG tablet Take by mouth. (Patient not taking: Reported on 3/18/2025)      [DISCONTINUED] prochlorperazine (COMPAZINE) 5 MG tablet Take 1 tablet (5 mg total) by mouth every 6 (six) hours as needed for Nausea. (Patient not taking: Reported on 3/18/2025) 20 tablet 1     No current facility-administered medications on file prior to visit.      Review of Systems   Constitutional:  Negative for appetite change and unexpected weight change.   HENT:  Negative for mouth sores.    Eyes:  Negative for visual disturbance.   Respiratory:  Negative for cough and shortness of breath.    Cardiovascular:  Negative for chest pain.   Gastrointestinal:  Negative for abdominal pain and diarrhea.   Genitourinary:  Negative for frequency.   Musculoskeletal:  Negative for back pain.   Integumentary:  Negative for rash.   Neurological:  Negative for headaches.   Hematological:  Negative for adenopathy.   Psychiatric/Behavioral:  The patient is not nervous/anxious.          Vitals:    03/18/25 0915   BP: 115/67  "  Pulse: 93   Resp: 14   Temp: 98.2 °F (36.8 °C)   TempSrc: Oral   SpO2: 99%   Weight: 63 kg (138 lb 14.4 oz)   Height: 5' 2" (1.575 m)     Physical Exam  Constitutional:       Appearance: Normal appearance. She is normal weight.   HENT:      Head: Normocephalic.      Comments: alopecia     Nose: Nose normal.      Mouth/Throat:      Mouth: Mucous membranes are moist.   Eyes:      Extraocular Movements: Extraocular movements intact.      Conjunctiva/sclera: Conjunctivae normal.   Cardiovascular:      Rate and Rhythm: Normal rate and regular rhythm.   Pulmonary:      Effort: Pulmonary effort is normal.      Breath sounds: Normal breath sounds.   Chest:      Comments: Anterior superior chest with incision from mediastinoscopy healed well; left chest wall mediport in place healed well  Abdominal:      General: Bowel sounds are normal. There is no distension.      Palpations: Abdomen is soft.      Tenderness: There is no abdominal tenderness.   Musculoskeletal:         General: Normal range of motion.   Skin:     General: Skin is warm.   Neurological:      General: No focal deficit present.      Mental Status: She is alert and oriented to person, place, and time.   Psychiatric:         Mood and Affect: Mood normal.         Behavior: Behavior is cooperative.         Judgment: Judgment normal.       Lab Visit on 03/18/2025   Component Date Value    WBC 03/18/2025 6.55     RBC 03/18/2025 3.30 (L)     Hgb 03/18/2025 11.5 (L)     Hct 03/18/2025 35.4 (L)     MCV 03/18/2025 107.3 (H)     MCH 03/18/2025 34.8 (H)     MCHC 03/18/2025 32.5 (L)     RDW 03/18/2025 14.6     Platelet 03/18/2025 311     MPV 03/18/2025 9.2     Neut % 03/18/2025 72.8     Lymph % 03/18/2025 15.1     Mono % 03/18/2025 7.9     Eos % 03/18/2025 3.8     Basophil % 03/18/2025 0.2     Imm Grans % 03/18/2025 0.2     Neut # 03/18/2025 4.77     Lymph # 03/18/2025 0.99     Mono # 03/18/2025 0.52     Eos # 03/18/2025 0.25     Baso # 03/18/2025 0.01     Imm Gran # " 03/18/2025 0.01           Assessment:       1. Malignant neoplasm of upper lobe of right lung    2. Secondary malignant neoplasm of mediastinum    3. Antineoplastic chemotherapy induced anemia    4. Thyroid disorder      Plan:       Patient with Stage IIIA Lung cancer, NSCLC TTF-1+, poorly differentiated, not specified if adenocarcinoma or squamous cell carcinoma, although did confirm with pathology this is NSCLC. [employer] testing with PD-L1 85% and KRAS G12C mutation, diagnosed 8/29/24.   Primary tumor is either occult or may be the 7mm spiculated RUL mass. This was biopsied previously via bronchoscopy and reportedly negative. Will obtain records.  PET shows hypermetabolic right hilar LN and paratracheal LN, and the 7mm RUL lung nodule is not hypermetabolic, though too small to be evaluated by PET.  Brain MRI with no metastatic disease.   Case discussed with Dr. Kay.     Per NCCN guidelines, treatment recommended with definitive concurrent chemoradiation (Category 1) followed by Durvalumab maintenance.   Patient delayed her treatment for family trip and had 2nd opinion in [place] and agreed with plan.    Treatment started with concurrent RT and weekly Carboplatin/Paclitaxel on 12/10/24.   Completed 6 cycles on 1/14/25. Completed XRT on 1/24/25.   CT chest w/ contrast done 2/14/25 with stable RUL nodule, no mention of any enlarged lymph nodes, so assuming these are improved.     Started maintenance Durvalumab on 2/21/25.   Tolerated well.   Recent labs with improving anemia, Hgb 11.5 g/dL. WBC normal, neutropenia resolved, platelets normal. CMP, TSH and anemia workup still pending.   Cycle 2 due later this week.   Continue monthly labs.   F/u in 4 weeks labs and toxicity check, prior to Cycle 3.  All questions answered at this time.   Smoking cessation strongly encouraged and she has quit!      Jeannie Clark MD    Visit today included increased complexity associated with the care of the episodic problem  NSCLC, addressing and managing the longitudinal care of the patient's lung cancer.    Prior to the patient's arrival on the same day, I spent (5) minutes reviewing chart. Once in the exam room with the patient, I spent (10) minutes in the room with the member performing a history and exam as well as reviewing the test results and recommendations with the patient. After leaving the exam room, I spent an additional (5) minutes completing the electronic health record. The total time spent that day making medical decisions for the patient is (20) minutes, and this time - including the breakdown - is documented in the medical record.

## 2025-03-18 ENCOUNTER — OFFICE VISIT (OUTPATIENT)
Dept: HEMATOLOGY/ONCOLOGY | Facility: CLINIC | Age: 65
End: 2025-03-18
Payer: MEDICARE

## 2025-03-18 ENCOUNTER — LAB VISIT (OUTPATIENT)
Dept: LAB | Facility: HOSPITAL | Age: 65
End: 2025-03-18
Attending: INTERNAL MEDICINE
Payer: MEDICARE

## 2025-03-18 VITALS
RESPIRATION RATE: 14 BRPM | TEMPERATURE: 98 F | HEART RATE: 93 BPM | SYSTOLIC BLOOD PRESSURE: 115 MMHG | HEIGHT: 62 IN | DIASTOLIC BLOOD PRESSURE: 67 MMHG | BODY MASS INDEX: 25.55 KG/M2 | WEIGHT: 138.88 LBS | OXYGEN SATURATION: 99 %

## 2025-03-18 DIAGNOSIS — E03.2 HYPOTHYROIDISM DUE TO DRUGS: ICD-10-CM

## 2025-03-18 DIAGNOSIS — C34.11 MALIGNANT NEOPLASM OF UPPER LOBE OF RIGHT LUNG: ICD-10-CM

## 2025-03-18 DIAGNOSIS — D64.81 ANTINEOPLASTIC CHEMOTHERAPY INDUCED ANEMIA: ICD-10-CM

## 2025-03-18 DIAGNOSIS — C78.1 SECONDARY MALIGNANT NEOPLASM OF MEDIASTINUM: ICD-10-CM

## 2025-03-18 DIAGNOSIS — E07.9 THYROID DISORDER: ICD-10-CM

## 2025-03-18 DIAGNOSIS — C34.11 MALIGNANT NEOPLASM OF UPPER LOBE OF RIGHT LUNG: Primary | ICD-10-CM

## 2025-03-18 DIAGNOSIS — T45.1X5A ANTINEOPLASTIC CHEMOTHERAPY INDUCED ANEMIA: ICD-10-CM

## 2025-03-18 LAB
ALBUMIN SERPL-MCNC: 3.4 G/DL (ref 3.4–4.8)
ALBUMIN/GLOB SERPL: 0.9 RATIO (ref 1.1–2)
ALP SERPL-CCNC: 67 UNIT/L (ref 40–150)
ALT SERPL-CCNC: 6 UNIT/L (ref 0–55)
ANION GAP SERPL CALC-SCNC: 8 MEQ/L
AST SERPL-CCNC: 12 UNIT/L (ref 5–34)
BASOPHILS # BLD AUTO: 0.01 X10(3)/MCL
BASOPHILS NFR BLD AUTO: 0.2 %
BILIRUB SERPL-MCNC: 0.4 MG/DL
BUN SERPL-MCNC: 14.2 MG/DL (ref 9.8–20.1)
CALCIUM SERPL-MCNC: 9.5 MG/DL (ref 8.4–10.2)
CHLORIDE SERPL-SCNC: 104 MMOL/L (ref 98–107)
CO2 SERPL-SCNC: 26 MMOL/L (ref 23–31)
CREAT SERPL-MCNC: 0.79 MG/DL (ref 0.55–1.02)
CREAT/UREA NIT SERPL: 18
EOSINOPHIL # BLD AUTO: 0.25 X10(3)/MCL (ref 0–0.9)
EOSINOPHIL NFR BLD AUTO: 3.8 %
ERYTHROCYTE [DISTWIDTH] IN BLOOD BY AUTOMATED COUNT: 14.6 % (ref 11.5–17)
FERRITIN SERPL-MCNC: 141.37 NG/ML (ref 4.63–204)
FOLATE SERPL-MCNC: 6.9 NG/ML (ref 7–31.4)
GFR SERPLBLD CREATININE-BSD FMLA CKD-EPI: >60 ML/MIN/1.73/M2
GLOBULIN SER-MCNC: 4 GM/DL (ref 2.4–3.5)
GLUCOSE SERPL-MCNC: 99 MG/DL (ref 82–115)
HCT VFR BLD AUTO: 35.4 % (ref 37–47)
HGB BLD-MCNC: 11.5 G/DL (ref 12–16)
IMM GRANULOCYTES # BLD AUTO: 0.01 X10(3)/MCL (ref 0–0.04)
IMM GRANULOCYTES NFR BLD AUTO: 0.2 %
IRON SATN MFR SERPL: 24 % (ref 20–50)
IRON SERPL-MCNC: 56 UG/DL (ref 50–170)
LYMPHOCYTES # BLD AUTO: 0.99 X10(3)/MCL (ref 0.6–4.6)
LYMPHOCYTES NFR BLD AUTO: 15.1 %
MCH RBC QN AUTO: 34.8 PG (ref 27–31)
MCHC RBC AUTO-ENTMCNC: 32.5 G/DL (ref 33–36)
MCV RBC AUTO: 107.3 FL (ref 80–94)
MONOCYTES # BLD AUTO: 0.52 X10(3)/MCL (ref 0.1–1.3)
MONOCYTES NFR BLD AUTO: 7.9 %
NEUTROPHILS # BLD AUTO: 4.77 X10(3)/MCL (ref 2.1–9.2)
NEUTROPHILS NFR BLD AUTO: 72.8 %
PLATELET # BLD AUTO: 311 X10(3)/MCL (ref 130–400)
PMV BLD AUTO: 9.2 FL (ref 7.4–10.4)
POTASSIUM SERPL-SCNC: 4 MMOL/L (ref 3.5–5.1)
PROT SERPL-MCNC: 7.4 GM/DL (ref 5.8–7.6)
RBC # BLD AUTO: 3.3 X10(6)/MCL (ref 4.2–5.4)
SODIUM SERPL-SCNC: 138 MMOL/L (ref 136–145)
TIBC SERPL-MCNC: 178 UG/DL (ref 70–310)
TIBC SERPL-MCNC: 234 UG/DL (ref 250–450)
TRANSFERRIN SERPL-MCNC: 208 MG/DL (ref 173–360)
TSH SERPL-ACNC: 0.28 UIU/ML (ref 0.35–4.94)
VIT B12 SERPL-MCNC: 325 PG/ML (ref 213–816)
WBC # BLD AUTO: 6.55 X10(3)/MCL (ref 4.5–11.5)

## 2025-03-18 PROCEDURE — 82746 ASSAY OF FOLIC ACID SERUM: CPT

## 2025-03-18 PROCEDURE — 1126F AMNT PAIN NOTED NONE PRSNT: CPT | Mod: CPTII,S$GLB,, | Performed by: NURSE PRACTITIONER

## 2025-03-18 PROCEDURE — 82607 VITAMIN B-12: CPT

## 2025-03-18 PROCEDURE — 3008F BODY MASS INDEX DOCD: CPT | Mod: CPTII,S$GLB,, | Performed by: NURSE PRACTITIONER

## 2025-03-18 PROCEDURE — 80053 COMPREHEN METABOLIC PANEL: CPT

## 2025-03-18 PROCEDURE — 84443 ASSAY THYROID STIM HORMONE: CPT

## 2025-03-18 PROCEDURE — 85025 COMPLETE CBC W/AUTO DIFF WBC: CPT

## 2025-03-18 PROCEDURE — 1160F RVW MEDS BY RX/DR IN RCRD: CPT | Mod: CPTII,S$GLB,, | Performed by: NURSE PRACTITIONER

## 2025-03-18 PROCEDURE — 36415 COLL VENOUS BLD VENIPUNCTURE: CPT

## 2025-03-18 PROCEDURE — 83540 ASSAY OF IRON: CPT

## 2025-03-18 PROCEDURE — G2211 COMPLEX E/M VISIT ADD ON: HCPCS | Mod: S$GLB,,, | Performed by: NURSE PRACTITIONER

## 2025-03-18 PROCEDURE — 1101F PT FALLS ASSESS-DOCD LE1/YR: CPT | Mod: CPTII,S$GLB,, | Performed by: NURSE PRACTITIONER

## 2025-03-18 PROCEDURE — 3074F SYST BP LT 130 MM HG: CPT | Mod: CPTII,S$GLB,, | Performed by: NURSE PRACTITIONER

## 2025-03-18 PROCEDURE — 3078F DIAST BP <80 MM HG: CPT | Mod: CPTII,S$GLB,, | Performed by: NURSE PRACTITIONER

## 2025-03-18 PROCEDURE — 99999 PR PBB SHADOW E&M-EST. PATIENT-LVL IV: CPT | Mod: PBBFAC,,, | Performed by: NURSE PRACTITIONER

## 2025-03-18 PROCEDURE — 3288F FALL RISK ASSESSMENT DOCD: CPT | Mod: CPTII,S$GLB,, | Performed by: NURSE PRACTITIONER

## 2025-03-18 PROCEDURE — 99215 OFFICE O/P EST HI 40 MIN: CPT | Mod: S$GLB,,, | Performed by: NURSE PRACTITIONER

## 2025-03-18 PROCEDURE — 82728 ASSAY OF FERRITIN: CPT

## 2025-03-18 PROCEDURE — 1159F MED LIST DOCD IN RCRD: CPT | Mod: CPTII,S$GLB,, | Performed by: NURSE PRACTITIONER

## 2025-03-18 RX ORDER — SODIUM CHLORIDE 0.9 % (FLUSH) 0.9 %
10 SYRINGE (ML) INJECTION
Status: CANCELLED | OUTPATIENT
Start: 2025-03-21

## 2025-03-18 RX ORDER — EPINEPHRINE 0.3 MG/.3ML
0.3 INJECTION SUBCUTANEOUS ONCE AS NEEDED
Status: CANCELLED | OUTPATIENT
Start: 2025-03-21

## 2025-03-18 RX ORDER — DIPHENHYDRAMINE HYDROCHLORIDE 50 MG/ML
50 INJECTION, SOLUTION INTRAMUSCULAR; INTRAVENOUS ONCE AS NEEDED
Status: CANCELLED | OUTPATIENT
Start: 2025-03-21

## 2025-03-18 RX ORDER — HEPARIN 100 UNIT/ML
500 SYRINGE INTRAVENOUS
Status: CANCELLED | OUTPATIENT
Start: 2025-03-21

## 2025-03-18 RX ORDER — HYDROCODONE BITARTRATE AND ACETAMINOPHEN 7.5; 325 MG/1; MG/1
1 TABLET ORAL 4 TIMES DAILY PRN
COMMUNITY
Start: 2025-01-24

## 2025-03-21 ENCOUNTER — INFUSION (OUTPATIENT)
Dept: INFUSION THERAPY | Facility: HOSPITAL | Age: 65
End: 2025-03-21
Attending: INTERNAL MEDICINE
Payer: MEDICARE

## 2025-03-21 VITALS
SYSTOLIC BLOOD PRESSURE: 106 MMHG | TEMPERATURE: 98 F | RESPIRATION RATE: 16 BRPM | HEART RATE: 85 BPM | BODY MASS INDEX: 25.55 KG/M2 | OXYGEN SATURATION: 99 % | WEIGHT: 138.88 LBS | DIASTOLIC BLOOD PRESSURE: 69 MMHG | HEIGHT: 62 IN

## 2025-03-21 DIAGNOSIS — C34.11 MALIGNANT NEOPLASM OF UPPER LOBE OF RIGHT LUNG: Primary | ICD-10-CM

## 2025-03-21 PROCEDURE — 63600175 PHARM REV CODE 636 W HCPCS: Mod: JZ,TB | Performed by: NURSE PRACTITIONER

## 2025-03-21 PROCEDURE — 96413 CHEMO IV INFUSION 1 HR: CPT

## 2025-03-21 PROCEDURE — 25000003 PHARM REV CODE 250: Performed by: NURSE PRACTITIONER

## 2025-03-21 RX ORDER — HEPARIN 100 UNIT/ML
500 SYRINGE INTRAVENOUS
Status: DISCONTINUED | OUTPATIENT
Start: 2025-03-21 | End: 2025-03-21 | Stop reason: HOSPADM

## 2025-03-21 RX ORDER — EPINEPHRINE 0.3 MG/.3ML
0.3 INJECTION SUBCUTANEOUS ONCE AS NEEDED
Status: DISCONTINUED | OUTPATIENT
Start: 2025-03-21 | End: 2025-03-21 | Stop reason: HOSPADM

## 2025-03-21 RX ORDER — SODIUM CHLORIDE 0.9 % (FLUSH) 0.9 %
10 SYRINGE (ML) INJECTION
Status: DISCONTINUED | OUTPATIENT
Start: 2025-03-21 | End: 2025-03-21 | Stop reason: HOSPADM

## 2025-03-21 RX ORDER — DIPHENHYDRAMINE HYDROCHLORIDE 50 MG/ML
50 INJECTION, SOLUTION INTRAMUSCULAR; INTRAVENOUS ONCE AS NEEDED
Status: DISCONTINUED | OUTPATIENT
Start: 2025-03-21 | End: 2025-03-21 | Stop reason: HOSPADM

## 2025-03-21 RX ADMIN — HEPARIN 500 UNITS: 100 SYRINGE at 09:03

## 2025-03-21 RX ADMIN — SODIUM CHLORIDE 1500 MG: 9 INJECTION, SOLUTION INTRAVENOUS at 08:03

## 2025-04-13 NOTE — PROGRESS NOTES
Subjective:       Patient ID: Valarie Rodriguez is a 64 y.o. female.    Pulmonologist: Dr. Zach Martinez    NSCLC Stage IIIA ([id number])--Diagnosed 24  Biopsy/pathology: Cervical mediastinoscopy done 24--multiple biopsies taken of 2R (upper paratracheal) LN and pathology showed poorly differentiated carcinoma, morphologically c/w NSCLC, c/w lung primary site, PD-L1 TPS 80%, CPS 85%, KRAS G12C mutation (approved therapies Adagrasib or Sotorasib), TMB 1.6 (low), MSI cannot be assessed.  Imagin. CT chest w/o contrast 23--7mm RUL spiculated nodule again seen and similar in appearance to comparison, suspicious for neoplasia, mediastinal lymph nodes similar in comparison, emphysema is noted.  2. CT chest w/o contrast done 2/15/24--stable RUL spiculated nodule, development of right hilar adenopathy, enlarged precarinal LN, increased in size compared to previous exam, PET/CT should be considered, emphysema.   3. CT chest w/o contrast 24--stable RUL spiculated nodule measures 7mm, stable small ground glass densities, enlarged precarinal LN measures 2.7X1.9cm, increased in size, right hilar LN vs. Mass measures 2.3X1.6cm, stable, PET should be considered, emphysema.   4. PET/CT done 24--hypermetabolic enlarged right paratracheal LN and prominent right hilum with slightly elevated activity c/w neoplasm, elevated activity of anterior thyroid bed and subcutaneous tissues, likely post-surgical, 7mm spiculated RUL nodule with adjacent vague opacity unchanged, and demonstrates no activity, bilateral L>R trochanteric bursitis, calcified uterine fibroid.   5. MRI brain w/ and w/o contrast done 24--no metastatic disease, mucous retention cyst vs, polyp in right maxillary antrum.  6. CT C/A/P 24--Upper normal to slightly enlarged single pretracheal mediastinal lymph node, nonspecific, bilaterally symmetric apical pleural based fibronodular scarring.  A somewhat irregular focal/nodular opacity in  the right apex noted measuring 8-9 mm, nonspecific, stable from the prior. Otherwise no evidence of malignancy.  7. CTA chest done 1/3/25--stable 1.3 cm lymph node in the pretracheal region, stable 8 mm non-calcified nodule with spiculated margins located in the right lung apex, no acute process.  8. CT chest w/ contrast 2/14/25-- Stable appearance of subcentimeter spiculated right lung apex nodule. No interval changes to suggest new/worsened metastatic disease within the chest.    Procedures:   Left subclavian mediport placed 12/2/24.    Treatment History:  Weekly Carboplatin/Paclitaxel + RT. Started on 12/10/24 (patient chose to delay treatment). Completed 6 cycles on 1/14/25. Completed XRT on 1/24/25.     Current treatment plan:   Durvalumab maintenance to start 2/21/25.     Chief Complaint: No chief complaint on file.    HPI  Patient presents for follow-up of NSCLC.She is doing better. Eating has improved. No other new problems reported. CT scan recent is stable to improved and I went over results with patient.     Past Medical History:   Diagnosis Date    Arthritis     Hyperlipidemia     Lymphadenopathy     Malignant neoplasm of upper lobe of right lung 09/13/2024    Pulmonary nodule     Thyroid disease       Past Surgical History:   Procedure Laterality Date    CHOLECYSTECTOMY      COLONOSCOPY      INSERTION OF TUNNELED CENTRAL VENOUS CATHETER (CVC) WITH SUBCUTANEOUS PORT N/A 12/2/2024    Procedure: [id number];  Surgeon: Meolny Collier MD;  Location: [facility];  Service: Cardiothoracic;  Laterality: N/A;  MEDIPORT FOR CHEMO    MEDIASTINOSCOPY N/A 8/29/2024    Procedure: MEDIASTINOSCOPY;  Surgeon: Melony Collier MD;  Location: [facility] OR;  Service: Cardiothoracic;  Laterality: N/A;    THYROIDECTOMY, PARTIAL      TUBAL LIGATION       No family history on file.  Social History     Socioeconomic History    Marital status: Single    Years of education: 11   Tobacco Use    Smoking status: Former      Current packs/day: 0.10     Average packs/day: 0.5 packs/day for 35.4 years (17.5 ttl pk-yrs)     Types: Cigarettes     Start date: 11/19/2024     Quit date: 11/2024    Tobacco comments:     Pt states that she quit 10/12/24.   Substance and Sexual Activity    Alcohol use: No     Alcohol/week: 0.0 standard drinks of alcohol    Drug use: No    Sexual activity: Yes     Partners: Male     Social Drivers of Health     Financial Resource Strain: Low Risk  (11/13/2024)    Overall Financial Resource Strain (CARDIA)     Difficulty of Paying Living Expenses: Not hard at all   Food Insecurity: No Food Insecurity (11/13/2024)    Hunger Vital Sign     Worried About Running Out of Food in the Last Year: Never true     Ran Out of Food in the Last Year: Never true   Physical Activity: Inactive (11/13/2024)    Exercise Vital Sign     Days of Exercise per Week: 0 days     Minutes of Exercise per Session: 0 min   Stress: Stress Concern Present (11/13/2024)    [employer] - Occupational Stress Questionnaire     Feeling of Stress : To some extent   Housing Stability: Unknown (11/13/2024)    Housing Stability Vital Sign     Unable to Pay for Housing in the Last Year: No       Review of patient's allergies indicates:  No Known Allergies   Current Outpatient Medications on File Prior to Visit   Medication Sig Dispense Refill    buPROPion (WELLBUTRIN XL) 300 MG 24 hr tablet Take 300 mg by mouth every morning. (Patient not taking: Reported on 2/18/2025)      cholecalciferol, vitamin D3, 1,250 mcg (50,000 unit) capsule Take 50,000 Units by mouth every 7 days.      HYDROcodone-acetaminophen (NORCO) 7.5-325 mg per tablet Take 1 tablet by mouth 4 (four) times daily as needed.      iron-vitamin C 100-250 mg, ICAR-C, 100-250 mg Tab Take 1 tablet by mouth every other day.      levothyroxine (SYNTHROID) 88 MCG tablet Take 88 mcg by mouth once daily.      LORazepam (ATIVAN) 1 MG tablet Take 1 mg by mouth every 12 (twelve)  hours as needed for Anxiety. (Patient not taking: Reported on 2/5/2025)      meloxicam (MOBIC) 15 MG tablet Take 15 mg by mouth daily as needed for Pain. (Patient not taking: Reported on 2/18/2025)      ondansetron (ZOFRAN) 8 MG tablet Take 1 tablet (8 mg total) by mouth every 8 (eight) hours as needed for Nausea. (Patient not taking: Reported on 3/18/2025) 20 tablet 1     No current facility-administered medications on file prior to visit.      Review of Systems   Constitutional:  Positive for appetite change and fatigue. Negative for unexpected weight change.   HENT:  Positive for sore throat. Negative for mouth sores.    Eyes:  Negative for visual disturbance.   Respiratory:  Negative for cough and shortness of breath.    Cardiovascular:  Negative for chest pain.   Gastrointestinal:  Positive for constipation and reflux. Negative for abdominal pain and diarrhea.   Genitourinary:  Negative for frequency.   Musculoskeletal:  Negative for back pain.   Integumentary:  Negative for rash.   Neurological:  Negative for headaches.   Hematological:  Negative for adenopathy.   Psychiatric/Behavioral:  The patient is not nervous/anxious.          There were no vitals filed for this visit.      Physical Exam  Constitutional:       Appearance: Normal appearance. She is normal weight.   HENT:      Head: Normocephalic.      Comments: alopecia     Nose: Nose normal.      Mouth/Throat:      Mouth: Mucous membranes are moist.   Eyes:      Extraocular Movements: Extraocular movements intact.      Conjunctiva/sclera: Conjunctivae normal.   Cardiovascular:      Rate and Rhythm: Normal rate and regular rhythm.   Pulmonary:      Effort: Pulmonary effort is normal.      Breath sounds: Normal breath sounds.   Chest:      Comments: Anterior superior chest with incision from mediastinoscopy healed well; left chest wall mediport in place healed well  Abdominal:      General: Bowel sounds are normal. There is no distension.      Palpations:  Abdomen is soft.      Tenderness: There is no abdominal tenderness.   Musculoskeletal:         General: Normal range of motion.   Skin:     General: Skin is warm.   Neurological:      General: No focal deficit present.      Mental Status: She is alert and oriented to person, place, and time.   Psychiatric:         Mood and Affect: Mood normal.         Behavior: Behavior is cooperative.         Judgment: Judgment normal.       No visits with results within 1 Week(s) from this visit.   Latest known visit with results is:   Lab Visit on 03/18/2025   Component Date Value    Sodium 03/18/2025 138     Potassium 03/18/2025 4.0     Chloride 03/18/2025 104     CO2 03/18/2025 26     Glucose 03/18/2025 99     Blood Urea Nitrogen 03/18/2025 14.2     Creatinine 03/18/2025 0.79     Calcium 03/18/2025 9.5     Protein Total 03/18/2025 7.4     Albumin 03/18/2025 3.4     Globulin 03/18/2025 4.0 (H)     Albumin/Globulin Ratio 03/18/2025 0.9 (L)     Bilirubin Total 03/18/2025 0.4     ALP 03/18/2025 67     ALT 03/18/2025 6     AST 03/18/2025 12     eGFR 03/18/2025 >60     Anion Gap 03/18/2025 8.0     BUN/Creatinine Ratio 03/18/2025 18     Ferritin Level 03/18/2025 141.37     Iron Binding Capacity Un* 03/18/2025 178     Iron Level 03/18/2025 56     Transferrin 03/18/2025 208     Iron Binding Capacity To* 03/18/2025 234 (L)     Iron Saturation 03/18/2025 24     Vitamin B12 03/18/2025 325     Folate Level 03/18/2025 6.9 (L)     TSH 03/18/2025 0.281 (L)     WBC 03/18/2025 6.55     RBC 03/18/2025 3.30 (L)     Hgb 03/18/2025 11.5 (L)     Hct 03/18/2025 35.4 (L)     MCV 03/18/2025 107.3 (H)     MCH 03/18/2025 34.8 (H)     MCHC 03/18/2025 32.5 (L)     RDW 03/18/2025 14.6     Platelet 03/18/2025 311     MPV 03/18/2025 9.2     Neut % 03/18/2025 72.8     Lymph % 03/18/2025 15.1     Mono % 03/18/2025 7.9     Eos % 03/18/2025 3.8     Basophil % 03/18/2025 0.2     Imm Grans % 03/18/2025 0.2     Neut # 03/18/2025 4.77     Lymph # 03/18/2025 0.99      Mono # 03/18/2025 0.52     Eos # 03/18/2025 0.25     Baso # 03/18/2025 0.01     Imm Gran # 03/18/2025 0.01           Assessment:       No diagnosis found.    Plan:       Patient with Stage IIIA Lung cancer, NSCLC TTF-1+, poorly differentiated, not specified if adenocarcinoma or squamous cell carcinoma, although did confirm with pathology this is NSCLC. [employer] testing with PD-L1 85% and KRAS G12C mutation, diagnosed 8/29/24.   Primary tumor is either occult or may be the 7mm spiculated RUL mass. This was biopsied previously via bronchoscopy and reportedly negative. Will obtain records.  PET shows hypermetabolic right hilar LN and paratracheal LN, and the 7mm RUL lung nodule is not hypermetabolic, though too small to be evaluated by PET.  Brain MRI with no metastatic disease.   Case discussed with Dr. Kay.     Per NCCN guidelines, treatment recommended with definitive concurrent chemoradiation (Category 1) followed by Durvalumab maintenance.   Patient delayed her treatment for family trip and had 2nd opinion in [place] and agreed with plan.    Treatment started with concurrent RT and weekly Carboplatin/Paclitaxel on 12/10/24.   Completed 6 cycles on 1/14/25. Completed XRT on 1/24/25.     CT chest w/ contrast done 2/14/25 with stable RUL nodule, no mention of any enlarged lymph nodes, so assuming these are improved. Will clarify with radiologist.     Recent labs with slowly improving anemia, Hgb 9.7 g/dL. WBC better, neutropenia resolved, platelets normal. CMP and TSH good.     Discussed most common and more serious side effects of Durvalumab.  Plan to begin treatment on Friday 2/21/25.  Patient given information from [employer] on Durvalumab.    F/u in 4 weeks labs and toxicity check, prior to next dose due on 3/21/25.  Will obtain anemia work-up on RTC. She will restart her oral iron as well.     All questions answered at this time.   Smoking cessation strongly encouraged and she has quit!      Jeannie  MD Eduardo    Visit today included increased complexity associated with the care of the episodic problem NSCLC, addressing and managing the longitudinal care of the patient's lung cancer.

## 2025-04-15 ENCOUNTER — OFFICE VISIT (OUTPATIENT)
Dept: HEMATOLOGY/ONCOLOGY | Facility: CLINIC | Age: 65
End: 2025-04-15
Payer: MEDICARE

## 2025-04-15 ENCOUNTER — LAB VISIT (OUTPATIENT)
Dept: LAB | Facility: HOSPITAL | Age: 65
End: 2025-04-15
Attending: INTERNAL MEDICINE
Payer: MEDICARE

## 2025-04-15 VITALS
BODY MASS INDEX: 25.37 KG/M2 | HEIGHT: 62 IN | WEIGHT: 137.88 LBS | DIASTOLIC BLOOD PRESSURE: 66 MMHG | OXYGEN SATURATION: 99 % | HEART RATE: 64 BPM | SYSTOLIC BLOOD PRESSURE: 104 MMHG | RESPIRATION RATE: 14 BRPM

## 2025-04-15 DIAGNOSIS — C78.1 SECONDARY MALIGNANT NEOPLASM OF MEDIASTINUM: ICD-10-CM

## 2025-04-15 DIAGNOSIS — E07.9 THYROID DISORDER: ICD-10-CM

## 2025-04-15 DIAGNOSIS — C34.11 MALIGNANT NEOPLASM OF UPPER LOBE OF RIGHT LUNG: ICD-10-CM

## 2025-04-15 DIAGNOSIS — C34.11 MALIGNANT NEOPLASM OF UPPER LOBE OF RIGHT LUNG: Primary | ICD-10-CM

## 2025-04-15 DIAGNOSIS — E03.9 HYPOTHYROIDISM, UNSPECIFIED TYPE: ICD-10-CM

## 2025-04-15 DIAGNOSIS — C78.00: ICD-10-CM

## 2025-04-15 LAB
ALBUMIN SERPL-MCNC: 3.3 G/DL (ref 3.4–4.8)
ALBUMIN/GLOB SERPL: 0.8 RATIO (ref 1.1–2)
ALP SERPL-CCNC: 72 UNIT/L (ref 40–150)
ALT SERPL-CCNC: <5 UNIT/L (ref 0–55)
ANION GAP SERPL CALC-SCNC: 9 MEQ/L
AST SERPL-CCNC: 16 UNIT/L (ref 11–45)
BASOPHILS # BLD AUTO: 0.01 X10(3)/MCL
BASOPHILS NFR BLD AUTO: 0.2 %
BILIRUB SERPL-MCNC: 0.3 MG/DL
BUN SERPL-MCNC: 15.3 MG/DL (ref 9.8–20.1)
CALCIUM SERPL-MCNC: 9.3 MG/DL (ref 8.4–10.2)
CHLORIDE SERPL-SCNC: 105 MMOL/L (ref 98–107)
CO2 SERPL-SCNC: 25 MMOL/L (ref 23–31)
CREAT SERPL-MCNC: 0.9 MG/DL (ref 0.55–1.02)
CREAT/UREA NIT SERPL: 17
EOSINOPHIL # BLD AUTO: 0.19 X10(3)/MCL (ref 0–0.9)
EOSINOPHIL NFR BLD AUTO: 3.6 %
ERYTHROCYTE [DISTWIDTH] IN BLOOD BY AUTOMATED COUNT: 12.2 % (ref 11.5–17)
GFR SERPLBLD CREATININE-BSD FMLA CKD-EPI: >60 ML/MIN/1.73/M2
GLOBULIN SER-MCNC: 4 GM/DL (ref 2.4–3.5)
GLUCOSE SERPL-MCNC: 97 MG/DL (ref 82–115)
HCT VFR BLD AUTO: 36.7 % (ref 37–47)
HGB BLD-MCNC: 11.9 G/DL (ref 12–16)
IMM GRANULOCYTES # BLD AUTO: 0.01 X10(3)/MCL (ref 0–0.04)
IMM GRANULOCYTES NFR BLD AUTO: 0.2 %
LYMPHOCYTES # BLD AUTO: 1.13 X10(3)/MCL (ref 0.6–4.6)
LYMPHOCYTES NFR BLD AUTO: 21.6 %
MAGNESIUM SERPL-MCNC: 1.9 MG/DL (ref 1.6–2.6)
MCH RBC QN AUTO: 34.5 PG (ref 27–31)
MCHC RBC AUTO-ENTMCNC: 32.4 G/DL (ref 33–36)
MCV RBC AUTO: 106.4 FL (ref 80–94)
MONOCYTES # BLD AUTO: 0.38 X10(3)/MCL (ref 0.1–1.3)
MONOCYTES NFR BLD AUTO: 7.3 %
NEUTROPHILS # BLD AUTO: 3.5 X10(3)/MCL (ref 2.1–9.2)
NEUTROPHILS NFR BLD AUTO: 67.1 %
PLATELET # BLD AUTO: 260 X10(3)/MCL (ref 130–400)
PMV BLD AUTO: 10 FL (ref 7.4–10.4)
POTASSIUM SERPL-SCNC: 4.3 MMOL/L (ref 3.5–5.1)
PROT SERPL-MCNC: 7.3 GM/DL (ref 5.8–7.6)
RBC # BLD AUTO: 3.45 X10(6)/MCL (ref 4.2–5.4)
SODIUM SERPL-SCNC: 139 MMOL/L (ref 136–145)
TSH SERPL-ACNC: 0.39 UIU/ML (ref 0.35–4.94)
WBC # BLD AUTO: 5.22 X10(3)/MCL (ref 4.5–11.5)

## 2025-04-15 PROCEDURE — 85025 COMPLETE CBC W/AUTO DIFF WBC: CPT

## 2025-04-15 PROCEDURE — 3074F SYST BP LT 130 MM HG: CPT | Mod: CPTII,S$GLB,, | Performed by: INTERNAL MEDICINE

## 2025-04-15 PROCEDURE — 1160F RVW MEDS BY RX/DR IN RCRD: CPT | Mod: CPTII,S$GLB,, | Performed by: INTERNAL MEDICINE

## 2025-04-15 PROCEDURE — 99215 OFFICE O/P EST HI 40 MIN: CPT | Mod: S$GLB,,, | Performed by: INTERNAL MEDICINE

## 2025-04-15 PROCEDURE — G2211 COMPLEX E/M VISIT ADD ON: HCPCS | Mod: S$GLB,,, | Performed by: INTERNAL MEDICINE

## 2025-04-15 PROCEDURE — 3008F BODY MASS INDEX DOCD: CPT | Mod: CPTII,S$GLB,, | Performed by: INTERNAL MEDICINE

## 2025-04-15 PROCEDURE — 1125F AMNT PAIN NOTED PAIN PRSNT: CPT | Mod: CPTII,S$GLB,, | Performed by: INTERNAL MEDICINE

## 2025-04-15 PROCEDURE — 36415 COLL VENOUS BLD VENIPUNCTURE: CPT

## 2025-04-15 PROCEDURE — 83735 ASSAY OF MAGNESIUM: CPT

## 2025-04-15 PROCEDURE — 3078F DIAST BP <80 MM HG: CPT | Mod: CPTII,S$GLB,, | Performed by: INTERNAL MEDICINE

## 2025-04-15 PROCEDURE — 80053 COMPREHEN METABOLIC PANEL: CPT

## 2025-04-15 PROCEDURE — 1101F PT FALLS ASSESS-DOCD LE1/YR: CPT | Mod: CPTII,S$GLB,, | Performed by: INTERNAL MEDICINE

## 2025-04-15 PROCEDURE — 99999 PR PBB SHADOW E&M-EST. PATIENT-LVL IV: CPT | Mod: PBBFAC,,, | Performed by: INTERNAL MEDICINE

## 2025-04-15 PROCEDURE — 1159F MED LIST DOCD IN RCRD: CPT | Mod: CPTII,S$GLB,, | Performed by: INTERNAL MEDICINE

## 2025-04-15 PROCEDURE — 3288F FALL RISK ASSESSMENT DOCD: CPT | Mod: CPTII,S$GLB,, | Performed by: INTERNAL MEDICINE

## 2025-04-15 PROCEDURE — 84443 ASSAY THYROID STIM HORMONE: CPT

## 2025-04-15 RX ORDER — FOLIC ACID 1 MG/1
1 TABLET ORAL DAILY
Status: SHIPPED | OUTPATIENT
Start: 2025-04-15

## 2025-04-15 NOTE — PROGRESS NOTES
Subjective:       Patient ID: Valarie Rodriguez is a 64 y.o. female.    Pulmonologist: Dr. Zach Martinez    NSCLC Stage IIIA ([id number])--Diagnosed 24  Biopsy/pathology: Cervical mediastinoscopy done 24--multiple biopsies taken of 2R (upper paratracheal) LN and pathology showed poorly differentiated carcinoma, morphologically c/w NSCLC, c/w lung primary site, PD-L1 TPS 80%, CPS 85%, KRAS G12C mutation (approved therapies Adagrasib or Sotorasib), TMB 1.6 (low), MSI cannot be assessed.  Imagin. CT chest w/o contrast 23--7mm RUL spiculated nodule again seen and similar in appearance to comparison, suspicious for neoplasia, mediastinal lymph nodes similar in comparison, emphysema is noted.  2. CT chest w/o contrast done 2/15/24--stable RUL spiculated nodule, development of right hilar adenopathy, enlarged precarinal LN, increased in size compared to previous exam, PET/CT should be considered, emphysema.   3. CT chest w/o contrast 24--stable RUL spiculated nodule measures 7mm, stable small ground glass densities, enlarged precarinal LN measures 2.7X1.9cm, increased in size, right hilar LN vs. Mass measures 2.3X1.6cm, stable, PET should be considered, emphysema.   4. PET/CT done 24--hypermetabolic enlarged right paratracheal LN and prominent right hilum with slightly elevated activity c/w neoplasm, elevated activity of anterior thyroid bed and subcutaneous tissues, likely post-surgical, 7mm spiculated RUL nodule with adjacent vague opacity unchanged, and demonstrates no activity, bilateral L>R trochanteric bursitis, calcified uterine fibroid.   5. MRI brain w/ and w/o contrast done 24--no metastatic disease, mucous retention cyst vs, polyp in right maxillary antrum.  6. CT C/A/P 24--Upper normal to slightly enlarged single pretracheal mediastinal lymph node, nonspecific, bilaterally symmetric apical pleural based fibronodular scarring.  A somewhat irregular focal/nodular opacity in  the right apex noted measuring 8-9 mm, nonspecific, stable from the prior. Otherwise no evidence of malignancy.  7. CTA chest done 1/3/25--stable 1.3 cm lymph node in the pretracheal region, stable 8 mm non-calcified nodule with spiculated margins located in the right lung apex, no acute process.  8. CT chest w/ contrast 2/14/25-- Stable appearance of subcentimeter spiculated right lung apex nodule. No interval changes to suggest new/worsened metastatic disease within the chest.    Procedures:   Left subclavian mediport placed 12/2/24.    Treatment History:  Weekly Carboplatin/Paclitaxel + RT. Started on 12/10/24 (patient chose to delay treatment). Completed 6 cycles on 1/14/25. Completed XRT on 1/24/25.     Current treatment plan:   Durvalumab maintenance. Started on 2/21/25.   Cycle 3 due on 4/18/25.     Chief Complaint: Back Pain    HPI  Patient presents for follow-up of NSCLC. She is doing well. Continues on Durvalumab. C/o some lower back/hip pain. It is mostly muscular. Otherwise she is doing well. No complaints.     Past Medical History:   Diagnosis Date    Arthritis     Hyperlipidemia     Lymphadenopathy     Malignant neoplasm of upper lobe of right lung 09/13/2024    Pulmonary nodule     Thyroid disease       Past Surgical History:   Procedure Laterality Date    CHOLECYSTECTOMY      COLONOSCOPY      INSERTION OF TUNNELED CENTRAL VENOUS CATHETER (CVC) WITH SUBCUTANEOUS PORT N/A 12/2/2024    Procedure: [id number];  Surgeon: Melony Collier MD;  Location: [facility] OR;  Service: Cardiothoracic;  Laterality: N/A;  MEDIPORT FOR CHEMO    MEDIASTINOSCOPY N/A 8/29/2024    Procedure: MEDIASTINOSCOPY;  Surgeon: Melony Collier MD;  Location: [facility] OR;  Service: Cardiothoracic;  Laterality: N/A;    THYROIDECTOMY, PARTIAL      TUBAL LIGATION       No family history on file.  Social History     Socioeconomic History    Marital status: Single    Years of education: 11   Tobacco Use    Smoking status: Former      Current packs/day: 0.10     Average packs/day: 0.5 packs/day for 35.4 years (17.5 ttl pk-yrs)     Types: Cigarettes     Start date: 11/19/2024     Quit date: 11/2024    Tobacco comments:     Pt states that she quit 10/12/24.   Substance and Sexual Activity    Alcohol use: No     Alcohol/week: 0.0 standard drinks of alcohol    Drug use: No    Sexual activity: Yes     Partners: Male     Social Drivers of Health     Financial Resource Strain: Low Risk  (11/13/2024)    Overall Financial Resource Strain (CARDIA)     Difficulty of Paying Living Expenses: Not hard at all   Food Insecurity: No Food Insecurity (11/13/2024)    Hunger Vital Sign     Worried About Running Out of Food in the Last Year: Never true     Ran Out of Food in the Last Year: Never true   Physical Activity: Inactive (11/13/2024)    Exercise Vital Sign     Days of Exercise per Week: 0 days     Minutes of Exercise per Session: 0 min   Stress: Stress Concern Present (11/13/2024)    [employer] [employer] - Occupational Stress Questionnaire     Feeling of Stress : To some extent   Housing Stability: Unknown (11/13/2024)    Housing Stability Vital Sign     Unable to Pay for Housing in the Last Year: No       Review of patient's allergies indicates:  No Known Allergies   Current Outpatient Medications on File Prior to Visit   Medication Sig Dispense Refill    cholecalciferol, vitamin D3, 1,250 mcg (50,000 unit) capsule Take 50,000 Units by mouth every 7 days.      HYDROcodone-acetaminophen (NORCO) 7.5-325 mg per tablet Take 1 tablet by mouth 4 (four) times daily as needed.      iron-vitamin C 100-250 mg, ICAR-C, 100-250 mg Tab Take 1 tablet by mouth every other day.      levothyroxine (SYNTHROID) 88 MCG tablet Take 88 mcg by mouth once daily.      meloxicam (MOBIC) 15 MG tablet Take 15 mg by mouth daily as needed for Pain.      ondansetron (ZOFRAN) 8 MG tablet Take 1 tablet (8 mg total) by mouth every 8 (eight) hours as needed for Nausea. 20  "tablet 1    buPROPion (WELLBUTRIN XL) 300 MG 24 hr tablet Take 300 mg by mouth every morning. (Patient not taking: Reported on 2/18/2025)      LORazepam (ATIVAN) 1 MG tablet Take 1 mg by mouth every 12 (twelve) hours as needed for Anxiety. (Patient not taking: Reported on 2/5/2025)       No current facility-administered medications on file prior to visit.      Review of Systems   Constitutional:  Negative for appetite change and unexpected weight change.   HENT:  Negative for mouth sores.    Eyes:  Negative for visual disturbance.   Respiratory:  Negative for cough and shortness of breath.    Cardiovascular:  Negative for chest pain.   Gastrointestinal:  Negative for abdominal pain and diarrhea.   Genitourinary:  Negative for frequency.   Musculoskeletal:  Positive for back pain.   Integumentary:  Negative for rash.   Neurological:  Negative for headaches.   Hematological:  Negative for adenopathy.   Psychiatric/Behavioral:  The patient is not nervous/anxious.          Vitals:    04/15/25 0835   BP: 104/66   Pulse: 64   Resp: 14   TempSrc: Oral   SpO2: 99%   Weight: 62.6 kg (137 lb 14.4 oz)   Height: 5' 2" (1.575 m)     Physical Exam  Constitutional:       Appearance: Normal appearance. She is normal weight.   HENT:      Head: Normocephalic.      Comments: alopecia     Nose: Nose normal.      Mouth/Throat:      Mouth: Mucous membranes are moist.   Eyes:      Extraocular Movements: Extraocular movements intact.      Conjunctiva/sclera: Conjunctivae normal.   Cardiovascular:      Rate and Rhythm: Normal rate and regular rhythm.   Pulmonary:      Effort: Pulmonary effort is normal.      Breath sounds: Normal breath sounds.   Chest:      Comments: Anterior superior chest with incision from mediastinoscopy healed well; left chest wall mediport in place healed well  Abdominal:      General: Bowel sounds are normal. There is no distension.      Palpations: Abdomen is soft.      Tenderness: There is no abdominal tenderness. "   Musculoskeletal:         General: Normal range of motion.   Skin:     General: Skin is warm.   Neurological:      General: No focal deficit present.      Mental Status: She is alert and oriented to person, place, and time.   Psychiatric:         Mood and Affect: Mood normal.         Behavior: Behavior is cooperative.         Judgment: Judgment normal.       Lab Visit on 04/15/2025   Component Date Value    WBC 04/15/2025 5.22     RBC 04/15/2025 3.45 (L)     Hgb 04/15/2025 11.9 (L)     Hct 04/15/2025 36.7 (L)     MCV 04/15/2025 106.4 (H)     MCH 04/15/2025 34.5 (H)     MCHC 04/15/2025 32.4 (L)     RDW 04/15/2025 12.2     Platelet 04/15/2025 260     MPV 04/15/2025 10.0     Neut % 04/15/2025 67.1     Lymph % 04/15/2025 21.6     Mono % 04/15/2025 7.3     Eos % 04/15/2025 3.6     Basophil % 04/15/2025 0.2     Imm Grans % 04/15/2025 0.2     Neut # 04/15/2025 3.50     Lymph # 04/15/2025 1.13     Mono # 04/15/2025 0.38     Eos # 04/15/2025 0.19     Baso # 04/15/2025 0.01     Imm Gran # 04/15/2025 0.01           Assessment:       1. Malignant neoplasm of upper lobe of right lung    2. Secondary malignant neoplasm of mediastinum      Plan:       Patient with Stage IIIA Lung cancer, NSCLC TTF-1+, poorly differentiated, not specified if adenocarcinoma or squamous cell carcinoma, although did confirm with pathology this is NSCLC. [employer] testing with PD-L1 85% and KRAS G12C mutation, diagnosed 8/29/24.   Primary tumor is either occult or may be the 7mm spiculated RUL mass. This was biopsied previously via bronchoscopy and reportedly negative. Will obtain records.  PET shows hypermetabolic right hilar LN and paratracheal LN, and the 7mm RUL lung nodule is not hypermetabolic, though too small to be evaluated by PET.  Brain MRI with no metastatic disease.   Case discussed with Dr. Kay.     Per NCCN guidelines, treatment recommended with definitive concurrent chemoradiation (Category 1) followed by Durvalumab maintenance.    Patient delayed her treatment for family trip and had 2nd opinion in [place] and agreed with plan.    Treatment started with concurrent RT and weekly Carboplatin/Paclitaxel on 12/10/24.   Completed 6 cycles on 1/14/25. Completed XRT on 1/24/25.   CT chest w/ contrast done 2/14/25 with stable RUL nodule, no mention of any enlarged lymph nodes, so assuming these are improved.     Started maintenance Durvalumab on 2/21/25.   S/p C2 and tolerating well.   She does have some sciatica type pain, suppose unrelated. Encouraged her to do some stretching exercises.     Recent labs with improving anemia, Hgb 11.9 g/dL. WBC normal, neutropenia resolved, platelets normal. CMP. Anemia work-up shows folic acid deficiency.   Start oral folic acid 1mg daily.  TSH mildly low last visit, pending for today and will follow-up.     Cycle 3 due later this week. Okay to proceed pending labs.     Continue monthly labs.   F/u in 4 weeks labs and toxicity check, prior to Cycle 4.    Plan to repeat CT chest w/ contrast in 6/2025, can order at next visit.     All questions answered at this time.   Smoking cessation strongly encouraged and she has quit!      Jeannie Clark MD    Visit today included increased complexity associated with the care of the episodic problem NSCLC, addressing and managing the longitudinal care of the patient's lung cancer.

## 2025-04-16 RX ORDER — HEPARIN 100 UNIT/ML
500 SYRINGE INTRAVENOUS
Status: CANCELLED | OUTPATIENT
Start: 2025-04-17

## 2025-04-16 RX ORDER — EPINEPHRINE 0.3 MG/.3ML
0.3 INJECTION SUBCUTANEOUS ONCE AS NEEDED
Status: CANCELLED | OUTPATIENT
Start: 2025-04-17

## 2025-04-16 RX ORDER — SODIUM CHLORIDE 0.9 % (FLUSH) 0.9 %
10 SYRINGE (ML) INJECTION
Status: CANCELLED | OUTPATIENT
Start: 2025-04-17

## 2025-04-16 RX ORDER — DIPHENHYDRAMINE HYDROCHLORIDE 50 MG/ML
50 INJECTION, SOLUTION INTRAMUSCULAR; INTRAVENOUS ONCE AS NEEDED
Status: CANCELLED | OUTPATIENT
Start: 2025-04-17

## 2025-04-17 ENCOUNTER — INFUSION (OUTPATIENT)
Dept: INFUSION THERAPY | Facility: HOSPITAL | Age: 65
End: 2025-04-17
Attending: INTERNAL MEDICINE
Payer: MEDICARE

## 2025-04-17 VITALS
DIASTOLIC BLOOD PRESSURE: 73 MMHG | OXYGEN SATURATION: 99 % | SYSTOLIC BLOOD PRESSURE: 143 MMHG | RESPIRATION RATE: 16 BRPM | TEMPERATURE: 98 F | HEART RATE: 68 BPM

## 2025-04-17 DIAGNOSIS — C34.11 MALIGNANT NEOPLASM OF UPPER LOBE OF RIGHT LUNG: Primary | ICD-10-CM

## 2025-04-17 PROCEDURE — 96413 CHEMO IV INFUSION 1 HR: CPT

## 2025-04-17 PROCEDURE — 25000003 PHARM REV CODE 250: Performed by: INTERNAL MEDICINE

## 2025-04-17 PROCEDURE — 63600175 PHARM REV CODE 636 W HCPCS: Performed by: INTERNAL MEDICINE

## 2025-04-17 RX ORDER — DIPHENHYDRAMINE HYDROCHLORIDE 50 MG/ML
50 INJECTION, SOLUTION INTRAMUSCULAR; INTRAVENOUS ONCE AS NEEDED
Status: DISCONTINUED | OUTPATIENT
Start: 2025-04-17 | End: 2025-04-17 | Stop reason: HOSPADM

## 2025-04-17 RX ORDER — EPINEPHRINE 0.3 MG/.3ML
0.3 INJECTION SUBCUTANEOUS ONCE AS NEEDED
Status: DISCONTINUED | OUTPATIENT
Start: 2025-04-17 | End: 2025-04-17 | Stop reason: HOSPADM

## 2025-04-17 RX ORDER — HEPARIN 100 UNIT/ML
500 SYRINGE INTRAVENOUS
Status: DISCONTINUED | OUTPATIENT
Start: 2025-04-17 | End: 2025-04-17 | Stop reason: HOSPADM

## 2025-04-17 RX ORDER — SODIUM CHLORIDE 0.9 % (FLUSH) 0.9 %
10 SYRINGE (ML) INJECTION
Status: DISCONTINUED | OUTPATIENT
Start: 2025-04-17 | End: 2025-04-17 | Stop reason: HOSPADM

## 2025-04-17 RX ADMIN — SODIUM CHLORIDE: 9 INJECTION, SOLUTION INTRAVENOUS at 09:04

## 2025-04-17 RX ADMIN — SODIUM CHLORIDE 1500 MG: 9 INJECTION, SOLUTION INTRAVENOUS at 09:04

## 2025-04-17 RX ADMIN — HEPARIN 500 UNITS: 100 SYRINGE at 10:04

## 2025-05-06 NOTE — PROGRESS NOTES
Subjective:       Patient ID: Valarie Rodriguez is a 64 y.o. female.    Pulmonologist: Dr. Zach Martinez    NSCLC Stage IIIA ([id number])--Diagnosed 24  Biopsy/pathology: Cervical mediastinoscopy done 24--multiple biopsies taken of 2R (upper paratracheal) LN and pathology showed poorly differentiated carcinoma, morphologically c/w NSCLC, c/w lung primary site, PD-L1 TPS 80%, CPS 85%, KRAS G12C mutation (approved therapies Adagrasib or Sotorasib), TMB 1.6 (low), MSI cannot be assessed.  Imagin. CT chest w/o contrast 23--7mm RUL spiculated nodule again seen and similar in appearance to comparison, suspicious for neoplasia, mediastinal lymph nodes similar in comparison, emphysema is noted.  2. CT chest w/o contrast done 2/15/24--stable RUL spiculated nodule, development of right hilar adenopathy, enlarged precarinal LN, increased in size compared to previous exam, PET/CT should be considered, emphysema.   3. CT chest w/o contrast 24--stable RUL spiculated nodule measures 7mm, stable small ground glass densities, enlarged precarinal LN measures 2.7X1.9cm, increased in size, right hilar LN vs. Mass measures 2.3X1.6cm, stable, PET should be considered, emphysema.   4. PET/CT done 24--hypermetabolic enlarged right paratracheal LN and prominent right hilum with slightly elevated activity c/w neoplasm, elevated activity of anterior thyroid bed and subcutaneous tissues, likely post-surgical, 7mm spiculated RUL nodule with adjacent vague opacity unchanged, and demonstrates no activity, bilateral L>R trochanteric bursitis, calcified uterine fibroid.   5. MRI brain w/ and w/o contrast done 24--no metastatic disease, mucous retention cyst vs, polyp in right maxillary antrum.  6. CT C/A/P 24--Upper normal to slightly enlarged single pretracheal mediastinal lymph node, nonspecific, bilaterally symmetric apical pleural based fibronodular scarring.  A somewhat irregular focal/nodular opacity in  the right apex noted measuring 8-9 mm, nonspecific, stable from the prior. Otherwise no evidence of malignancy.  7. CTA chest done 1/3/25--stable 1.3 cm lymph node in the pretracheal region, stable 8 mm non-calcified nodule with spiculated margins located in the right lung apex, no acute process.  8. CT chest w/ contrast 2/14/25-- Stable appearance of subcentimeter spiculated right lung apex nodule. No interval changes to suggest new/worsened metastatic disease within the chest.    Procedures:   Left subclavian mediport placed 12/2/24.    Treatment History:  Weekly Carboplatin/Paclitaxel + RT. Started on 12/10/24 (patient chose to delay treatment). Completed 6 cycles on 1/14/25. Completed XRT on 1/24/25.     Current treatment plan:   Durvalumab maintenance. Started on 2/21/25.   Cycle 4 due on 5/15/25.     Chief Complaint: 4 wk f/u    HPI  Patient presents for follow-up of NSCLC. She is doing well. Continues on Durvalumab. Only c/o fatigue. Mentions that they are having a benefit for her on 5/31 to help pay some of her bills. No other new problems.     Past Medical History:   Diagnosis Date    Arthritis     Hyperlipidemia     Lymphadenopathy     Malignant neoplasm of upper lobe of right lung 09/13/2024    Pulmonary nodule     Thyroid disease       Past Surgical History:   Procedure Laterality Date    CHOLECYSTECTOMY      COLONOSCOPY      INSERTION OF TUNNELED CENTRAL VENOUS CATHETER (CVC) WITH SUBCUTANEOUS PORT N/A 12/2/2024    Procedure: [id number];  Surgeon: Melony Collier MD;  Location: [facility] OR;  Service: Cardiothoracic;  Laterality: N/A;  MEDIPORT FOR CHEMO    MEDIASTINOSCOPY N/A 8/29/2024    Procedure: MEDIASTINOSCOPY;  Surgeon: Melony Collier MD;  Location: [facility] OR;  Service: Cardiothoracic;  Laterality: N/A;    THYROIDECTOMY, PARTIAL      TUBAL LIGATION       No family history on file.  Social History     Socioeconomic History    Marital status: Single    Years of education: 11   Tobacco Use     Smoking status: Former     Current packs/day: 0.10     Average packs/day: 0.5 packs/day for 35.5 years (17.5 ttl pk-yrs)     Types: Cigarettes     Start date: 11/19/2024     Quit date: 11/2024    Tobacco comments:     Pt states that she quit 10/12/24.   Substance and Sexual Activity    Alcohol use: No     Alcohol/week: 0.0 standard drinks of alcohol    Drug use: No    Sexual activity: Yes     Partners: Male     Social Drivers of Health     Financial Resource Strain: Low Risk  (11/13/2024)    Overall Financial Resource Strain (CARDIA)     Difficulty of Paying Living Expenses: Not hard at all   Food Insecurity: No Food Insecurity (11/13/2024)    Hunger Vital Sign     Worried About Running Out of Food in the Last Year: Never true     Ran Out of Food in the Last Year: Never true   Physical Activity: Inactive (11/13/2024)    Exercise Vital Sign     Days of Exercise per Week: 0 days     Minutes of Exercise per Session: 0 min   Stress: Stress Concern Present (11/13/2024)    [employer] - Occupational Stress Questionnaire     Feeling of Stress : To some extent   Housing Stability: Unknown (11/13/2024)    Housing Stability Vital Sign     Unable to Pay for Housing in the Last Year: No       Review of patient's allergies indicates:  No Known Allergies   Current Outpatient Medications on File Prior to Visit   Medication Sig Dispense Refill    buPROPion (WELLBUTRIN XL) 300 MG 24 hr tablet Take 300 mg by mouth every morning.      cholecalciferol, vitamin D3, 1,250 mcg (50,000 unit) capsule Take 50,000 Units by mouth every 7 days.      HYDROcodone-acetaminophen (NORCO) 7.5-325 mg per tablet Take 1 tablet by mouth 4 (four) times daily as needed.      iron-vitamin C 100-250 mg, ICAR-C, 100-250 mg Tab Take 1 tablet by mouth every other day.      levothyroxine (SYNTHROID) 88 MCG tablet Take 88 mcg by mouth once daily.      meloxicam (MOBIC) 15 MG tablet Take 15 mg by mouth daily as needed for Pain.       "ondansetron (ZOFRAN) 8 MG tablet Take 1 tablet (8 mg total) by mouth every 8 (eight) hours as needed for Nausea. 20 tablet 1    LORazepam (ATIVAN) 1 MG tablet Take 1 mg by mouth every 12 (twelve) hours as needed for Anxiety. (Patient not taking: Reported on 2/5/2025)       Current Facility-Administered Medications on File Prior to Visit   Medication Dose Route Frequency Provider Last Rate Last Admin    folic acid tablet 1 mg  1 mg Oral Daily Jeannie Clark MD          Review of Systems   Constitutional:  Positive for fatigue. Negative for appetite change and unexpected weight change.   HENT:  Negative for mouth sores.    Eyes:  Negative for visual disturbance.   Respiratory:  Negative for cough and shortness of breath.    Cardiovascular:  Negative for chest pain.   Gastrointestinal:  Negative for abdominal pain and diarrhea.   Genitourinary:  Negative for frequency.   Musculoskeletal:  Negative for back pain.   Integumentary:  Negative for rash.   Neurological:  Negative for headaches.   Hematological:  Negative for adenopathy.   Psychiatric/Behavioral:  The patient is not nervous/anxious.          Vitals:    05/13/25 0839   BP: 117/77   Pulse: 76   Resp: 18   Temp: 97.6 °F (36.4 °C)   TempSrc: Oral   SpO2: 100%   Weight: 62.1 kg (136 lb 12.8 oz)   Height: 5' 2" (1.575 m)       Physical Exam  Constitutional:       Appearance: Normal appearance. She is normal weight.   HENT:      Head: Normocephalic.      Comments: Resolving alopecia     Nose: Nose normal.      Mouth/Throat:      Mouth: Mucous membranes are moist.   Eyes:      Extraocular Movements: Extraocular movements intact.      Conjunctiva/sclera: Conjunctivae normal.   Cardiovascular:      Rate and Rhythm: Normal rate and regular rhythm.   Pulmonary:      Effort: Pulmonary effort is normal.      Breath sounds: Normal breath sounds.   Chest:      Comments: Anterior superior chest with incision from mediastinoscopy healed well; left chest wall mediport in " place healed well  Abdominal:      General: Bowel sounds are normal. There is no distension.      Palpations: Abdomen is soft.      Tenderness: There is no abdominal tenderness.   Musculoskeletal:         General: Normal range of motion.   Skin:     General: Skin is warm.   Neurological:      General: No focal deficit present.      Mental Status: She is alert and oriented to person, place, and time.   Psychiatric:         Mood and Affect: Mood normal.         Behavior: Behavior is cooperative.         Judgment: Judgment normal.       Lab Visit on 05/13/2025   Component Date Value    WBC 05/13/2025 7.70     RBC 05/13/2025 3.99 (L)     Hgb 05/13/2025 13.3     Hct 05/13/2025 40.1     MCV 05/13/2025 100.5 (H)     MCH 05/13/2025 33.3 (H)     MCHC 05/13/2025 33.2     RDW 05/13/2025 12.5     Platelet 05/13/2025 245     MPV 05/13/2025 9.6     Neut % 05/13/2025 70.2     Lymph % 05/13/2025 19.1     Mono % 05/13/2025 6.1     Eos % 05/13/2025 4.0     Basophil % 05/13/2025 0.3     Imm Grans % 05/13/2025 0.3     Neut # 05/13/2025 5.41     Lymph # 05/13/2025 1.47     Mono # 05/13/2025 0.47     Eos # 05/13/2025 0.31     Baso # 05/13/2025 0.02     Imm Gran # 05/13/2025 0.02           Assessment:       1. Malignant neoplasm of upper lobe of right lung    2. Secondary malignant neoplasm of mediastinum    3. 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 Folic acid deficiency        Plan:       Patient with Stage IIIA Lung cancer, NSCLC TTF-1+, poorly differentiated, not specified if adenocarcinoma or squamous cell carcinoma, although did confirm with pathology this is NSCLC. [employer] testing with PD-L1 85% and KRAS G12C mutation, diagnosed 8/29/24.   Primary tumor is either occult or may be the 7mm spiculated RUL mass. This was biopsied previously via bronchoscopy and reportedly negative. Will obtain records.  PET shows hypermetabolic right hilar LN and paratracheal LN, and the 7mm RUL lung nodule is not hypermetabolic, though too small to be  evaluated by PET.  Brain MRI with no metastatic disease.   Case discussed with Dr. Kay.     Per NCCN guidelines, treatment recommended with definitive concurrent chemoradiation (Category 1) followed by Durvalumab maintenance.   Patient delayed her treatment for family trip and had 2nd opinion in [place] and agreed with plan.    Treatment started with concurrent RT and weekly Carboplatin/Paclitaxel on 12/10/24.   Completed 6 cycles on 1/14/25. Completed XRT on 1/24/25.   CT chest w/ contrast done 2/14/25 with stable RUL nodule, no mention of any enlarged lymph nodes, so assuming these are improved.     Started maintenance Durvalumab on 2/21/25.   S/p C3 and tolerating well. She only has fatigue.     Labs with resolved anemia, WBC and platelets normal. Will f/u CMP and TSH.   Previous anemia work-up showed folic acid deficiency.   Start oral folic acid 1mg daily.    Cycle 4 due later this week. Okay to proceed pending labs.     Continue monthly labs.   F/u in 4 weeks labs and toxicity check, prior to Cycle 5.  Repeat CT chest w/ contrast before RTC for ongoing surveillance.     All questions answered at this time.   Smoking cessation strongly encouraged and she has quit!      Jeannie Clark MD    - code applied: patient requires or will require a continuous, longitudinal, and active collaborative plan of care related to this patient's health condition, NSCLC --the management of which requires the direction of a practitioner with specialized clinical knowledge, skill, and expertise, including the management of side effects and close monitoring of high risk medications (Durvalumab).

## 2025-05-13 ENCOUNTER — OFFICE VISIT (OUTPATIENT)
Dept: HEMATOLOGY/ONCOLOGY | Facility: CLINIC | Age: 65
End: 2025-05-13
Payer: MEDICARE

## 2025-05-13 ENCOUNTER — LAB VISIT (OUTPATIENT)
Dept: LAB | Facility: HOSPITAL | Age: 65
End: 2025-05-13
Attending: INTERNAL MEDICINE
Payer: MEDICARE

## 2025-05-13 VITALS
BODY MASS INDEX: 25.18 KG/M2 | HEIGHT: 62 IN | OXYGEN SATURATION: 100 % | WEIGHT: 136.81 LBS | HEART RATE: 76 BPM | TEMPERATURE: 98 F | DIASTOLIC BLOOD PRESSURE: 77 MMHG | RESPIRATION RATE: 18 BRPM | SYSTOLIC BLOOD PRESSURE: 117 MMHG

## 2025-05-13 DIAGNOSIS — C34.11 MALIGNANT NEOPLASM OF UPPER LOBE OF RIGHT LUNG: Primary | ICD-10-CM

## 2025-05-13 DIAGNOSIS — C78.1 SECONDARY MALIGNANT NEOPLASM OF MEDIASTINUM: ICD-10-CM

## 2025-05-13 DIAGNOSIS — E03.2 HYPOTHYROIDISM DUE TO DRUGS: ICD-10-CM

## 2025-05-13 DIAGNOSIS — E03.9 HYPOTHYROIDISM, UNSPECIFIED TYPE: ICD-10-CM

## 2025-05-13 DIAGNOSIS — C34.11 MALIGNANT NEOPLASM OF UPPER LOBE OF RIGHT LUNG: ICD-10-CM

## 2025-05-13 DIAGNOSIS — E53.8 FOLIC ACID DEFICIENCY: ICD-10-CM

## 2025-05-13 LAB
ALBUMIN SERPL-MCNC: 3.4 G/DL (ref 3.4–4.8)
ALBUMIN/GLOB SERPL: 0.9 RATIO (ref 1.1–2)
ALP SERPL-CCNC: 67 UNIT/L (ref 40–150)
ALT SERPL-CCNC: 6 UNIT/L (ref 0–55)
ANION GAP SERPL CALC-SCNC: 7 MEQ/L
AST SERPL-CCNC: 13 UNIT/L (ref 11–45)
BASOPHILS # BLD AUTO: 0.02 X10(3)/MCL
BASOPHILS NFR BLD AUTO: 0.3 %
BILIRUB SERPL-MCNC: 0.3 MG/DL
BUN SERPL-MCNC: 16.6 MG/DL (ref 9.8–20.1)
CALCIUM SERPL-MCNC: 9.3 MG/DL (ref 8.4–10.2)
CHLORIDE SERPL-SCNC: 107 MMOL/L (ref 98–107)
CO2 SERPL-SCNC: 25 MMOL/L (ref 23–31)
CREAT SERPL-MCNC: 0.8 MG/DL (ref 0.55–1.02)
CREAT/UREA NIT SERPL: 21
EOSINOPHIL # BLD AUTO: 0.31 X10(3)/MCL (ref 0–0.9)
EOSINOPHIL NFR BLD AUTO: 4 %
ERYTHROCYTE [DISTWIDTH] IN BLOOD BY AUTOMATED COUNT: 12.5 % (ref 11.5–17)
GFR SERPLBLD CREATININE-BSD FMLA CKD-EPI: >60 ML/MIN/1.73/M2
GLOBULIN SER-MCNC: 3.8 GM/DL (ref 2.4–3.5)
GLUCOSE SERPL-MCNC: 90 MG/DL (ref 82–115)
HCT VFR BLD AUTO: 40.1 % (ref 37–47)
HGB BLD-MCNC: 13.3 G/DL (ref 12–16)
IMM GRANULOCYTES # BLD AUTO: 0.02 X10(3)/MCL (ref 0–0.04)
IMM GRANULOCYTES NFR BLD AUTO: 0.3 %
LYMPHOCYTES # BLD AUTO: 1.47 X10(3)/MCL (ref 0.6–4.6)
LYMPHOCYTES NFR BLD AUTO: 19.1 %
MCH RBC QN AUTO: 33.3 PG (ref 27–31)
MCHC RBC AUTO-ENTMCNC: 33.2 G/DL (ref 33–36)
MCV RBC AUTO: 100.5 FL (ref 80–94)
MONOCYTES # BLD AUTO: 0.47 X10(3)/MCL (ref 0.1–1.3)
MONOCYTES NFR BLD AUTO: 6.1 %
NEUTROPHILS # BLD AUTO: 5.41 X10(3)/MCL (ref 2.1–9.2)
NEUTROPHILS NFR BLD AUTO: 70.2 %
PLATELET # BLD AUTO: 245 X10(3)/MCL (ref 130–400)
PMV BLD AUTO: 9.6 FL (ref 7.4–10.4)
POTASSIUM SERPL-SCNC: 4.3 MMOL/L (ref 3.5–5.1)
PROT SERPL-MCNC: 7.2 GM/DL (ref 5.8–7.6)
RBC # BLD AUTO: 3.99 X10(6)/MCL (ref 4.2–5.4)
SODIUM SERPL-SCNC: 139 MMOL/L (ref 136–145)
T4 FREE SERPL-MCNC: 1.52 NG/DL (ref 0.7–1.48)
TSH SERPL-ACNC: 0.97 UIU/ML (ref 0.35–4.94)
WBC # BLD AUTO: 7.7 X10(3)/MCL (ref 4.5–11.5)

## 2025-05-13 PROCEDURE — 1101F PT FALLS ASSESS-DOCD LE1/YR: CPT | Mod: CPTII,S$GLB,, | Performed by: INTERNAL MEDICINE

## 2025-05-13 PROCEDURE — 3288F FALL RISK ASSESSMENT DOCD: CPT | Mod: CPTII,S$GLB,, | Performed by: INTERNAL MEDICINE

## 2025-05-13 PROCEDURE — 3074F SYST BP LT 130 MM HG: CPT | Mod: CPTII,S$GLB,, | Performed by: INTERNAL MEDICINE

## 2025-05-13 PROCEDURE — 84443 ASSAY THYROID STIM HORMONE: CPT

## 2025-05-13 PROCEDURE — 80053 COMPREHEN METABOLIC PANEL: CPT

## 2025-05-13 PROCEDURE — 3078F DIAST BP <80 MM HG: CPT | Mod: CPTII,S$GLB,, | Performed by: INTERNAL MEDICINE

## 2025-05-13 PROCEDURE — 85025 COMPLETE CBC W/AUTO DIFF WBC: CPT

## 2025-05-13 PROCEDURE — 84439 ASSAY OF FREE THYROXINE: CPT

## 2025-05-13 PROCEDURE — 99215 OFFICE O/P EST HI 40 MIN: CPT | Mod: S$GLB,,, | Performed by: INTERNAL MEDICINE

## 2025-05-13 PROCEDURE — 3008F BODY MASS INDEX DOCD: CPT | Mod: CPTII,S$GLB,, | Performed by: INTERNAL MEDICINE

## 2025-05-13 PROCEDURE — 1126F AMNT PAIN NOTED NONE PRSNT: CPT | Mod: CPTII,S$GLB,, | Performed by: INTERNAL MEDICINE

## 2025-05-13 PROCEDURE — 36415 COLL VENOUS BLD VENIPUNCTURE: CPT

## 2025-05-13 PROCEDURE — G2211 COMPLEX E/M VISIT ADD ON: HCPCS | Mod: S$GLB,,, | Performed by: INTERNAL MEDICINE

## 2025-05-13 PROCEDURE — 1159F MED LIST DOCD IN RCRD: CPT | Mod: CPTII,S$GLB,, | Performed by: INTERNAL MEDICINE

## 2025-05-13 PROCEDURE — 99999 PR PBB SHADOW E&M-EST. PATIENT-LVL IV: CPT | Mod: PBBFAC,,, | Performed by: INTERNAL MEDICINE

## 2025-05-13 PROCEDURE — 1160F RVW MEDS BY RX/DR IN RCRD: CPT | Mod: CPTII,S$GLB,, | Performed by: INTERNAL MEDICINE

## 2025-05-13 RX ORDER — FOLIC ACID 1 MG/1
1 TABLET ORAL DAILY
Qty: 30 TABLET | Refills: 3 | Status: SHIPPED | OUTPATIENT
Start: 2025-05-13 | End: 2026-05-13

## 2025-05-14 RX ORDER — HEPARIN 100 UNIT/ML
500 SYRINGE INTRAVENOUS
Status: CANCELLED | OUTPATIENT
Start: 2025-05-15

## 2025-05-14 RX ORDER — DIPHENHYDRAMINE HYDROCHLORIDE 50 MG/ML
50 INJECTION, SOLUTION INTRAMUSCULAR; INTRAVENOUS ONCE AS NEEDED
Status: CANCELLED | OUTPATIENT
Start: 2025-05-15

## 2025-05-14 RX ORDER — SODIUM CHLORIDE 0.9 % (FLUSH) 0.9 %
10 SYRINGE (ML) INJECTION
Status: CANCELLED | OUTPATIENT
Start: 2025-05-15

## 2025-05-14 RX ORDER — EPINEPHRINE 0.3 MG/.3ML
0.3 INJECTION SUBCUTANEOUS ONCE AS NEEDED
Status: CANCELLED | OUTPATIENT
Start: 2025-05-15

## 2025-05-15 ENCOUNTER — INFUSION (OUTPATIENT)
Dept: INFUSION THERAPY | Facility: HOSPITAL | Age: 65
End: 2025-05-15
Attending: INTERNAL MEDICINE
Payer: MEDICARE

## 2025-05-15 VITALS
SYSTOLIC BLOOD PRESSURE: 116 MMHG | RESPIRATION RATE: 18 BRPM | HEIGHT: 62 IN | DIASTOLIC BLOOD PRESSURE: 72 MMHG | WEIGHT: 136.81 LBS | BODY MASS INDEX: 25.18 KG/M2 | HEART RATE: 76 BPM

## 2025-05-15 DIAGNOSIS — C34.11 MALIGNANT NEOPLASM OF UPPER LOBE OF RIGHT LUNG: Primary | ICD-10-CM

## 2025-05-15 PROCEDURE — 25000003 PHARM REV CODE 250: Performed by: INTERNAL MEDICINE

## 2025-05-15 PROCEDURE — 96413 CHEMO IV INFUSION 1 HR: CPT

## 2025-05-15 PROCEDURE — 63600175 PHARM REV CODE 636 W HCPCS: Performed by: INTERNAL MEDICINE

## 2025-05-15 RX ORDER — DIPHENHYDRAMINE HYDROCHLORIDE 50 MG/ML
50 INJECTION, SOLUTION INTRAMUSCULAR; INTRAVENOUS ONCE AS NEEDED
Status: DISCONTINUED | OUTPATIENT
Start: 2025-05-15 | End: 2025-05-15 | Stop reason: HOSPADM

## 2025-05-15 RX ORDER — EPINEPHRINE 0.3 MG/.3ML
0.3 INJECTION SUBCUTANEOUS ONCE AS NEEDED
Status: DISCONTINUED | OUTPATIENT
Start: 2025-05-15 | End: 2025-05-15 | Stop reason: HOSPADM

## 2025-05-15 RX ORDER — SODIUM CHLORIDE 0.9 % (FLUSH) 0.9 %
10 SYRINGE (ML) INJECTION
Status: DISCONTINUED | OUTPATIENT
Start: 2025-05-15 | End: 2025-05-15 | Stop reason: HOSPADM

## 2025-05-15 RX ORDER — HEPARIN 100 UNIT/ML
500 SYRINGE INTRAVENOUS
Status: DISCONTINUED | OUTPATIENT
Start: 2025-05-15 | End: 2025-05-15 | Stop reason: HOSPADM

## 2025-05-15 RX ADMIN — HEPARIN 500 UNITS: 100 SYRINGE at 09:05

## 2025-05-15 RX ADMIN — SODIUM CHLORIDE 1500 MG: 9 INJECTION, SOLUTION INTRAVENOUS at 08:05

## 2025-06-03 NOTE — PROGRESS NOTES
Subjective:       Patient ID: Valarie Rodriguez is a 64 y.o. female.    Pulmonologist: Dr. Zach Maritnez    NSCLC Stage IIIA ([id number])--Diagnosed 24  Biopsy/pathology: Cervical mediastinoscopy done 24--multiple biopsies taken of 2R (upper paratracheal) LN and pathology showed poorly differentiated carcinoma, morphologically c/w NSCLC, c/w lung primary site, PD-L1 TPS 80%, CPS 85%, KRAS G12C mutation (approved therapies Adagrasib or Sotorasib), TMB 1.6 (low), MSI cannot be assessed.  Imagin. CT chest w/o contrast 23--7mm RUL spiculated nodule again seen and similar in appearance to comparison, suspicious for neoplasia, mediastinal lymph nodes similar in comparison, emphysema is noted.  2. CT chest w/o contrast done 2/15/24--stable RUL spiculated nodule, development of right hilar adenopathy, enlarged precarinal LN, increased in size compared to previous exam, PET/CT should be considered, emphysema.   3. CT chest w/o contrast 24--stable RUL spiculated nodule measures 7mm, stable small ground glass densities, enlarged precarinal LN measures 2.7X1.9cm, increased in size, right hilar LN vs. Mass measures 2.3X1.6cm, stable, PET should be considered, emphysema.   4. PET/CT done 24--hypermetabolic enlarged right paratracheal LN and prominent right hilum with slightly elevated activity c/w neoplasm, elevated activity of anterior thyroid bed and subcutaneous tissues, likely post-surgical, 7mm spiculated RUL nodule with adjacent vague opacity unchanged, and demonstrates no activity, bilateral L>R trochanteric bursitis, calcified uterine fibroid.   5. MRI brain w/ and w/o contrast done 24--no metastatic disease, mucous retention cyst vs, polyp in right maxillary antrum.  6. CT C/A/P 24--Upper normal to slightly enlarged single pretracheal mediastinal lymph node, nonspecific, bilaterally symmetric apical pleural based fibronodular scarring.  A somewhat irregular focal/nodular opacity in  the right apex noted measuring 8-9 mm, nonspecific, stable from the prior. Otherwise no evidence of malignancy.  7. CTA chest done 1/3/25--stable 1.3 cm lymph node in the pretracheal region, stable 8 mm non-calcified nodule with spiculated margins located in the right lung apex, no acute process.  8. CT chest w/ contrast 2/14/25--Stable appearance of subcentimeter spiculated right lung apex nodule. No interval changes to suggest new/worsened metastatic disease within the chest.  9. CT chest w/ contrast 6/5/25--Two new right upper and middle lobe nodules measuring up to 6 mm in size, indeterminate. Short-term follow-up can be obtained to ensure resolution. Otherwise stable exam compared to 02/14/2025. Pericardial effusion, measuring 1.3cm in thickness, appears new.     Procedures:   Left subclavian mediport placed 12/2/24.    Treatment History:  Weekly Carboplatin/Paclitaxel + RT. Started on 12/10/24 (patient chose to delay treatment). Completed 6 cycles on 1/14/25. Completed XRT on 1/24/25.     Current treatment plan:   Durvalumab maintenance. Started on 2/21/25.   Cycle 5 due on 6/12/25.     Chief Complaint: Nausea and Emesis (Pt states that yesterday she broke out in a cold sweat and started vomiting. Her daughter states that pt blacked out but her eyes remained open.)    HPI  Patient presents for follow-up of NSCLC. She is doing well. Continues on Durvalumab. She did have an episode of N/V yesterday, hit her really fast, then resolved. She felt like she was going to pass out her grand daughter said she wasn't with it for a few seconds, but her eyes were open. She just rested after that and felt better. She feels fine today. No other issues. No other side effects from Durvalumab reported.    Past Medical History:   Diagnosis Date    Arthritis     Hyperlipidemia     Lymphadenopathy     Malignant neoplasm of upper lobe of right lung 09/13/2024    Pulmonary nodule     Thyroid disease       Past Surgical History:    Procedure Laterality Date    CHOLECYSTECTOMY      COLONOSCOPY      INSERTION OF TUNNELED CENTRAL VENOUS CATHETER (CVC) WITH SUBCUTANEOUS PORT N/A 12/2/2024    Procedure: [id number];  Surgeon: Melony Collier MD;  Location: [facility] OR;  Service: Cardiothoracic;  Laterality: N/A;  MEDIPORT FOR CHEMO    MEDIASTINOSCOPY N/A 8/29/2024    Procedure: MEDIASTINOSCOPY;  Surgeon: Melony Collier MD;  Location: [facility] OR;  Service: Cardiothoracic;  Laterality: N/A;    THYROIDECTOMY, PARTIAL      TUBAL LIGATION       No family history on file.  Social History     Socioeconomic History    Marital status: Single    Years of education: 11   Tobacco Use    Smoking status: Former     Current packs/day: 0.10     Average packs/day: 0.5 packs/day for 35.6 years (17.6 ttl pk-yrs)     Types: Cigarettes     Start date: 11/19/2024     Quit date: 11/2024    Tobacco comments:     Pt states that she quit 10/12/24.   Substance and Sexual Activity    Alcohol use: No     Alcohol/week: 0.0 standard drinks of alcohol    Drug use: No    Sexual activity: Yes     Partners: Male     Social Drivers of Health     Financial Resource Strain: Low Risk  (11/13/2024)    Overall Financial Resource Strain (CARDIA)     Difficulty of Paying Living Expenses: Not hard at all   Food Insecurity: No Food Insecurity (11/13/2024)    Hunger Vital Sign     Worried About Running Out of Food in the Last Year: Never true     Ran Out of Food in the Last Year: Never true   Physical Activity: Inactive (11/13/2024)    Exercise Vital Sign     Days of Exercise per Week: 0 days     Minutes of Exercise per Session: 0 min   Stress: Stress Concern Present (11/13/2024)    [employer] - Occupational Stress Questionnaire     Feeling of Stress : To some extent   Housing Stability: Unknown (11/13/2024)    Housing Stability Vital Sign     Unable to Pay for Housing in the Last Year: No       Review of patient's allergies indicates:  No Known  Allergies   Current Outpatient Medications on File Prior to Visit   Medication Sig Dispense Refill    cholecalciferol, vitamin D3, 1,250 mcg (50,000 unit) capsule Take 50,000 Units by mouth every 7 days.      iron-vitamin C 100-250 mg, ICAR-C, 100-250 mg Tab Take 1 tablet by mouth every other day.      levothyroxine (SYNTHROID) 88 MCG tablet Take 88 mcg by mouth once daily.      meloxicam (MOBIC) 15 MG tablet Take 15 mg by mouth daily as needed for Pain.      buPROPion (WELLBUTRIN XL) 300 MG 24 hr tablet Take 300 mg by mouth every morning. (Patient not taking: Reported on 6/10/2025)      folic acid (FOLVITE) 1 MG tablet Take 1 tablet (1 mg total) by mouth once daily. (Patient not taking: Reported on 6/10/2025) 30 tablet 3    HYDROcodone-acetaminophen (NORCO) 7.5-325 mg per tablet Take 1 tablet by mouth 4 (four) times daily as needed. (Patient not taking: Reported on 6/10/2025)      LORazepam (ATIVAN) 1 MG tablet Take 1 mg by mouth every 12 (twelve) hours as needed for Anxiety. (Patient not taking: Reported on 2/5/2025)      ondansetron (ZOFRAN) 8 MG tablet Take 1 tablet (8 mg total) by mouth every 8 (eight) hours as needed for Nausea. (Patient not taking: Reported on 6/10/2025) 20 tablet 1     Current Facility-Administered Medications on File Prior to Visit   Medication Dose Route Frequency Provider Last Rate Last Admin    folic acid tablet 1 mg  1 mg Oral Daily Jeannie Clark MD          Review of Systems   Constitutional:  Positive for fatigue. Negative for appetite change and unexpected weight change.   HENT:  Negative for mouth sores.    Eyes:  Negative for visual disturbance.   Respiratory:  Negative for cough and shortness of breath.    Cardiovascular:  Negative for chest pain.   Gastrointestinal:  Negative for abdominal pain and diarrhea.   Genitourinary:  Negative for frequency.   Musculoskeletal:  Negative for back pain.   Integumentary:  Negative for rash.   Neurological:  Negative for headaches.  "  Hematological:  Negative for adenopathy.   Psychiatric/Behavioral:  The patient is not nervous/anxious.          Vitals:    06/10/25 0853   BP: 131/65   Pulse: 80   Resp: 14   Temp: 98 °F (36.7 °C)   TempSrc: Oral   SpO2: 96%   Weight: 61.2 kg (134 lb 14.4 oz)   Height: 5' 2.2" (1.58 m)     Wt Readings from Last 3 Encounters:   06/10/25 0853 61.2 kg (134 lb 14.4 oz)   05/15/25 0828 62.1 kg (136 lb 12.7 oz)   05/13/25 0839 62.1 kg (136 lb 12.8 oz)     Physical Exam  Constitutional:       Appearance: Normal appearance. She is normal weight.   HENT:      Head: Normocephalic.      Comments: Resolving alopecia     Nose: Nose normal.      Mouth/Throat:      Mouth: Mucous membranes are moist.   Eyes:      Extraocular Movements: Extraocular movements intact.      Conjunctiva/sclera: Conjunctivae normal.   Cardiovascular:      Rate and Rhythm: Normal rate and regular rhythm.   Pulmonary:      Effort: Pulmonary effort is normal.      Breath sounds: Normal breath sounds.   Chest:      Comments: Anterior superior chest with incision from mediastinoscopy healed well; left chest wall mediport in place healed well  Abdominal:      General: Bowel sounds are normal. There is no distension.      Palpations: Abdomen is soft.      Tenderness: There is no abdominal tenderness.   Musculoskeletal:         General: Normal range of motion.   Skin:     General: Skin is warm.   Neurological:      General: No focal deficit present.      Mental Status: She is alert and oriented to person, place, and time.   Psychiatric:         Mood and Affect: Mood normal.         Behavior: Behavior is cooperative.         Judgment: Judgment normal.       Lab Visit on 06/10/2025   Component Date Value    WBC 06/10/2025 7.80     RBC 06/10/2025 3.90 (L)     Hgb 06/10/2025 12.8     Hct 06/10/2025 38.6     MCV 06/10/2025 99.0 (H)     MCH 06/10/2025 32.8 (H)     MCHC 06/10/2025 33.2     RDW 06/10/2025 13.5     Platelet 06/10/2025 285     MPV 06/10/2025 9.1     " Neut % 06/10/2025 68.0     Lymph % 06/10/2025 21.0     Mono % 06/10/2025 6.7     Eos % 06/10/2025 3.7     Basophil % 06/10/2025 0.5     Imm Grans % 06/10/2025 0.1     Neut # 06/10/2025 5.30     Lymph # 06/10/2025 1.64     Mono # 06/10/2025 0.52     Eos # 06/10/2025 0.29     Baso # 06/10/2025 0.04     Imm Gran # 06/10/2025 0.01           Assessment:       1. Malignant neoplasm of upper lobe of right lung    2. Secondary malignant neoplasm of mediastinum      Plan:       Patient with Stage IIIA Lung cancer, NSCLC TTF-1+, poorly differentiated, not specified if adenocarcinoma or squamous cell carcinoma, although did confirm with pathology this is NSCLC. [employer] testing with PD-L1 85% and KRAS G12C mutation, diagnosed 8/29/24.   Primary tumor is either occult or may be the 7mm spiculated RUL mass. This was biopsied previously via bronchoscopy and reportedly negative. Will obtain records.  PET shows hypermetabolic right hilar LN and paratracheal LN, and the 7mm RUL lung nodule is not hypermetabolic, though too small to be evaluated by PET.  Brain MRI with no metastatic disease.   Case discussed with Dr. Kay.     Per NCCN guidelines, treatment recommended with definitive concurrent chemoradiation (Category 1) followed by Durvalumab maintenance.   Patient delayed her treatment for family trip and had 2nd opinion in [place] and agreed with plan.    Treatment started with concurrent RT and weekly Carboplatin/Paclitaxel on 12/10/24.   Completed 6 cycles on 1/14/25. Completed XRT on 1/24/25.   CT chest w/ contrast done 2/14/25 with stable RUL nodule, no mention of any enlarged lymph nodes, so assuming these are improved.     Started maintenance Durvalumab on 2/21/25.   S/p C4 and tolerating well. She only has fatigue.   CT chest w/ contrast done 6/5/25 shows stable RUL 8mm nodule, 2 new nodules in right lung, indeterminate, short term follow-up recommended. Also a pericardial effusion is present, was not mentioned  before. Will obtain ECHO and refer to cardiology if needed.     Recommend to continue with same treatment.   Labs with CBC good, WBC and platelets normal. Will f/u CMP and TSH.   Previous anemia work-up showed folic acid deficiency.   Started on oral folic acid 1mg daily.    Cycle 5 due later this week. Okay to proceed pending labs.     Continue monthly labs.   F/u in 4 weeks labs and toxicity check, prior to Cycle 6.  Repeat CT chest w/ contrast in 3 months--before dose of Durvalumab in 9/2025.    All questions answered at this time.   Smoking cessation strongly encouraged and she has quit!      Jeannie Clark MD    - code applied: patient requires or will require a continuous, longitudinal, and active collaborative plan of care related to this patient's health condition, NSCLC --the management of which requires the direction of a practitioner with specialized clinical knowledge, skill, and expertise, including the management of side effects and close monitoring of high risk medications (Durvalumab).

## 2025-06-05 ENCOUNTER — HOSPITAL ENCOUNTER (OUTPATIENT)
Dept: RADIOLOGY | Facility: HOSPITAL | Age: 65
Discharge: HOME | End: 2025-06-05
Attending: INTERNAL MEDICINE
Payer: MEDICARE

## 2025-06-05 DIAGNOSIS — C34.11 MALIGNANT NEOPLASM OF UPPER LOBE OF RIGHT LUNG: ICD-10-CM

## 2025-06-05 DIAGNOSIS — C78.1 SECONDARY MALIGNANT NEOPLASM OF MEDIASTINUM: ICD-10-CM

## 2025-06-05 PROCEDURE — 71260 CT THORAX DX C+: CPT | Mod: TC

## 2025-06-05 PROCEDURE — 25500020 PHARM REV CODE 255: Performed by: INTERNAL MEDICINE

## 2025-06-05 RX ADMIN — IOHEXOL 75 ML: 350 INJECTION, SOLUTION INTRAVENOUS at 08:06

## 2025-06-10 ENCOUNTER — LAB VISIT (OUTPATIENT)
Dept: LAB | Facility: HOSPITAL | Age: 65
End: 2025-06-10
Attending: INTERNAL MEDICINE
Payer: MEDICARE

## 2025-06-10 ENCOUNTER — OFFICE VISIT (OUTPATIENT)
Dept: HEMATOLOGY/ONCOLOGY | Facility: CLINIC | Age: 65
End: 2025-06-10
Payer: MEDICARE

## 2025-06-10 VITALS
BODY MASS INDEX: 24.82 KG/M2 | DIASTOLIC BLOOD PRESSURE: 65 MMHG | HEART RATE: 80 BPM | WEIGHT: 134.88 LBS | HEIGHT: 62 IN | OXYGEN SATURATION: 96 % | RESPIRATION RATE: 14 BRPM | TEMPERATURE: 98 F | SYSTOLIC BLOOD PRESSURE: 131 MMHG

## 2025-06-10 DIAGNOSIS — I31.39 PERICARDIAL EFFUSION: ICD-10-CM

## 2025-06-10 DIAGNOSIS — C34.11 MALIGNANT NEOPLASM OF UPPER LOBE OF RIGHT LUNG: Primary | ICD-10-CM

## 2025-06-10 DIAGNOSIS — C78.1 SECONDARY MALIGNANT NEOPLASM OF MEDIASTINUM: ICD-10-CM

## 2025-06-10 DIAGNOSIS — E03.2 HYPOTHYROIDISM DUE TO DRUGS: ICD-10-CM

## 2025-06-10 DIAGNOSIS — C34.11 MALIGNANT NEOPLASM OF UPPER LOBE OF RIGHT LUNG: ICD-10-CM

## 2025-06-10 LAB
ALBUMIN SERPL-MCNC: 3.3 G/DL (ref 3.4–4.8)
ALBUMIN/GLOB SERPL: 0.8 RATIO (ref 1.1–2)
ALP SERPL-CCNC: 60 UNIT/L (ref 40–150)
ALT SERPL-CCNC: 7 UNIT/L (ref 0–55)
ANION GAP SERPL CALC-SCNC: 8 MEQ/L
AST SERPL-CCNC: 13 UNIT/L (ref 11–45)
BASOPHILS # BLD AUTO: 0.04 X10(3)/MCL
BASOPHILS NFR BLD AUTO: 0.5 %
BILIRUB SERPL-MCNC: 0.3 MG/DL
BUN SERPL-MCNC: 19.3 MG/DL (ref 9.8–20.1)
CALCIUM SERPL-MCNC: 9.5 MG/DL (ref 8.4–10.2)
CHLORIDE SERPL-SCNC: 106 MMOL/L (ref 98–107)
CO2 SERPL-SCNC: 26 MMOL/L (ref 23–31)
CREAT SERPL-MCNC: 0.84 MG/DL (ref 0.55–1.02)
CREAT/UREA NIT SERPL: 23
EOSINOPHIL # BLD AUTO: 0.29 X10(3)/MCL (ref 0–0.9)
EOSINOPHIL NFR BLD AUTO: 3.7 %
ERYTHROCYTE [DISTWIDTH] IN BLOOD BY AUTOMATED COUNT: 13.5 % (ref 11.5–17)
GFR SERPLBLD CREATININE-BSD FMLA CKD-EPI: >60 ML/MIN/1.73/M2
GLOBULIN SER-MCNC: 4.2 GM/DL (ref 2.4–3.5)
GLUCOSE SERPL-MCNC: 86 MG/DL (ref 82–115)
HCT VFR BLD AUTO: 38.6 % (ref 37–47)
HGB BLD-MCNC: 12.8 G/DL (ref 12–16)
IMM GRANULOCYTES # BLD AUTO: 0.01 X10(3)/MCL (ref 0–0.04)
IMM GRANULOCYTES NFR BLD AUTO: 0.1 %
LYMPHOCYTES # BLD AUTO: 1.64 X10(3)/MCL (ref 0.6–4.6)
LYMPHOCYTES NFR BLD AUTO: 21 %
MCH RBC QN AUTO: 32.8 PG (ref 27–31)
MCHC RBC AUTO-ENTMCNC: 33.2 G/DL (ref 33–36)
MCV RBC AUTO: 99 FL (ref 80–94)
MONOCYTES # BLD AUTO: 0.52 X10(3)/MCL (ref 0.1–1.3)
MONOCYTES NFR BLD AUTO: 6.7 %
NEUTROPHILS # BLD AUTO: 5.3 X10(3)/MCL (ref 2.1–9.2)
NEUTROPHILS NFR BLD AUTO: 68 %
PLATELET # BLD AUTO: 285 X10(3)/MCL (ref 130–400)
PMV BLD AUTO: 9.1 FL (ref 7.4–10.4)
POTASSIUM SERPL-SCNC: 4.3 MMOL/L (ref 3.5–5.1)
PROT SERPL-MCNC: 7.5 GM/DL (ref 5.8–7.6)
RBC # BLD AUTO: 3.9 X10(6)/MCL (ref 4.2–5.4)
SODIUM SERPL-SCNC: 140 MMOL/L (ref 136–145)
T4 FREE SERPL-MCNC: 1.3 NG/DL (ref 0.7–1.48)
TSH SERPL-ACNC: 1.5 UIU/ML (ref 0.35–4.94)
WBC # BLD AUTO: 7.8 X10(3)/MCL (ref 4.5–11.5)

## 2025-06-10 PROCEDURE — 85025 COMPLETE CBC W/AUTO DIFF WBC: CPT

## 2025-06-10 PROCEDURE — G2211 COMPLEX E/M VISIT ADD ON: HCPCS | Mod: S$GLB,,, | Performed by: INTERNAL MEDICINE

## 2025-06-10 PROCEDURE — 99215 OFFICE O/P EST HI 40 MIN: CPT | Mod: S$GLB,,, | Performed by: INTERNAL MEDICINE

## 2025-06-10 PROCEDURE — 1159F MED LIST DOCD IN RCRD: CPT | Mod: CPTII,S$GLB,, | Performed by: INTERNAL MEDICINE

## 2025-06-10 PROCEDURE — 1101F PT FALLS ASSESS-DOCD LE1/YR: CPT | Mod: CPTII,S$GLB,, | Performed by: INTERNAL MEDICINE

## 2025-06-10 PROCEDURE — 3078F DIAST BP <80 MM HG: CPT | Mod: CPTII,S$GLB,, | Performed by: INTERNAL MEDICINE

## 2025-06-10 PROCEDURE — 1160F RVW MEDS BY RX/DR IN RCRD: CPT | Mod: CPTII,S$GLB,, | Performed by: INTERNAL MEDICINE

## 2025-06-10 PROCEDURE — 3288F FALL RISK ASSESSMENT DOCD: CPT | Mod: CPTII,S$GLB,, | Performed by: INTERNAL MEDICINE

## 2025-06-10 PROCEDURE — 84443 ASSAY THYROID STIM HORMONE: CPT

## 2025-06-10 PROCEDURE — 84439 ASSAY OF FREE THYROXINE: CPT

## 2025-06-10 PROCEDURE — 36415 COLL VENOUS BLD VENIPUNCTURE: CPT

## 2025-06-10 PROCEDURE — 1126F AMNT PAIN NOTED NONE PRSNT: CPT | Mod: CPTII,S$GLB,, | Performed by: INTERNAL MEDICINE

## 2025-06-10 PROCEDURE — 99999 PR PBB SHADOW E&M-EST. PATIENT-LVL IV: CPT | Mod: PBBFAC,,, | Performed by: INTERNAL MEDICINE

## 2025-06-10 PROCEDURE — 3075F SYST BP GE 130 - 139MM HG: CPT | Mod: CPTII,S$GLB,, | Performed by: INTERNAL MEDICINE

## 2025-06-10 PROCEDURE — 3008F BODY MASS INDEX DOCD: CPT | Mod: CPTII,S$GLB,, | Performed by: INTERNAL MEDICINE

## 2025-06-10 PROCEDURE — 80053 COMPREHEN METABOLIC PANEL: CPT

## 2025-06-11 ENCOUNTER — HOSPITAL ENCOUNTER (OUTPATIENT)
Dept: CARDIOLOGY | Facility: HOSPITAL | Age: 65
Discharge: HOME | End: 2025-06-11
Attending: INTERNAL MEDICINE
Payer: MEDICARE

## 2025-06-11 DIAGNOSIS — I31.39 PERICARDIAL EFFUSION: ICD-10-CM

## 2025-06-11 LAB
APICAL FOUR CHAMBER EJECTION FRACTION: 61 %
APICAL TWO CHAMBER EJECTION FRACTION: 63 %
AV INDEX (PROSTH): 0.67
AV MEAN GRADIENT: 5 MMHG
AV PEAK GRADIENT: 9 MMHG
AV VELOCITY RATIO: 0.67
CV ECHO LV RWT: 0.49 CM
DOP CALC AO PEAK VEL: 1.5 M/S
DOP CALC AO VTI: 32.4 CM
DOP CALC LVOT PEAK VEL: 1 M/S
DOP CALCLVOT PEAK VEL VTI: 21.8 CM
E WAVE DECELERATION TIME: 190 MSEC
E/A RATIO: 0.91
E/E' RATIO: 11 M/S
ECHO LV POSTERIOR WALL: 1 CM (ref 0.6–1.1)
FRACTIONAL SHORTENING: 43.9 % (ref 28–44)
GLOBAL LONGITUIDAL STRAIN: 11 %
INTERVENTRICULAR SEPTUM: 0.9 CM (ref 0.6–1.1)
LEFT ATRIUM AREA SYSTOLIC (APICAL 2 CHAMBER): 16.2 CM2
LEFT ATRIUM AREA SYSTOLIC (APICAL 4 CHAMBER): 13.1 CM2
LEFT ATRIUM SIZE: 2.6 CM
LEFT ATRIUM VOLUME MOD: 35 ML
LEFT INTERNAL DIMENSION IN SYSTOLE: 2.3 CM (ref 2.1–4)
LEFT VENTRICLE DIASTOLIC VOLUME: 74 ML
LEFT VENTRICLE END DIASTOLIC VOLUME APICAL 2 CHAMBER: 57.2 ML
LEFT VENTRICLE END DIASTOLIC VOLUME APICAL 4 CHAMBER: 56.2 ML
LEFT VENTRICLE END SYSTOLIC VOLUME APICAL 2 CHAMBER: 43.5 ML
LEFT VENTRICLE END SYSTOLIC VOLUME APICAL 4 CHAMBER: 27.1 ML
LEFT VENTRICLE SYSTOLIC VOLUME: 18 ML
LEFT VENTRICULAR INTERNAL DIMENSION IN DIASTOLE: 4.1 CM (ref 3.5–6)
LEFT VENTRICULAR MASS: 123 G
LV LATERAL E/E' RATIO: 11.6 M/S
LV SEPTAL E/E' RATIO: 10.3 M/S
LVED V (TEICH): 74.2 ML
LVES V (TEICH): 18.1 ML
LVOT MG: 2 MMHG
LVOT MV: 0.71 CM/S
MV PEAK A VEL: 1.02 M/S
MV PEAK E VEL: 0.93 M/S
OHS LV EJECTION FRACTION SIMPSONS BIPLANE MOD: 63 %
PV PEAK GRADIENT: 3 MMHG
PV PEAK VELOCITY: 0.88 M/S
RA PRESSURE ESTIMATED: 8 MMHG
TDI LATERAL: 0.08 M/S
TDI SEPTAL: 0.09 M/S
TDI: 0.09 M/S
TRICUSPID ANNULAR PLANE SYSTOLIC EXCURSION: 2 CM

## 2025-06-11 PROCEDURE — 93306 TTE W/DOPPLER COMPLETE: CPT

## 2025-06-11 RX ORDER — SODIUM CHLORIDE 0.9 % (FLUSH) 0.9 %
10 SYRINGE (ML) INJECTION
Status: CANCELLED | OUTPATIENT
Start: 2025-06-12

## 2025-06-11 RX ORDER — DIPHENHYDRAMINE HYDROCHLORIDE 50 MG/ML
50 INJECTION, SOLUTION INTRAMUSCULAR; INTRAVENOUS ONCE AS NEEDED
Status: CANCELLED | OUTPATIENT
Start: 2025-06-12

## 2025-06-11 RX ORDER — HEPARIN 100 UNIT/ML
500 SYRINGE INTRAVENOUS
Status: CANCELLED | OUTPATIENT
Start: 2025-06-12

## 2025-06-11 RX ORDER — EPINEPHRINE 0.3 MG/.3ML
0.3 INJECTION SUBCUTANEOUS ONCE AS NEEDED
Status: CANCELLED | OUTPATIENT
Start: 2025-06-12

## 2025-06-12 ENCOUNTER — INFUSION (OUTPATIENT)
Dept: INFUSION THERAPY | Facility: HOSPITAL | Age: 65
End: 2025-06-12
Attending: INTERNAL MEDICINE
Payer: MEDICARE

## 2025-06-12 VITALS — SYSTOLIC BLOOD PRESSURE: 116 MMHG | DIASTOLIC BLOOD PRESSURE: 61 MMHG | HEART RATE: 71 BPM

## 2025-06-12 DIAGNOSIS — C34.11 MALIGNANT NEOPLASM OF UPPER LOBE OF RIGHT LUNG: Primary | ICD-10-CM

## 2025-06-12 PROCEDURE — 96413 CHEMO IV INFUSION 1 HR: CPT

## 2025-06-12 PROCEDURE — 63600175 PHARM REV CODE 636 W HCPCS: Performed by: INTERNAL MEDICINE

## 2025-06-12 PROCEDURE — 25000003 PHARM REV CODE 250: Performed by: INTERNAL MEDICINE

## 2025-06-12 RX ORDER — DIPHENHYDRAMINE HYDROCHLORIDE 50 MG/ML
50 INJECTION, SOLUTION INTRAMUSCULAR; INTRAVENOUS ONCE AS NEEDED
Status: DISCONTINUED | OUTPATIENT
Start: 2025-06-12 | End: 2025-06-12 | Stop reason: HOSPADM

## 2025-06-12 RX ORDER — SODIUM CHLORIDE 0.9 % (FLUSH) 0.9 %
10 SYRINGE (ML) INJECTION
Status: DISCONTINUED | OUTPATIENT
Start: 2025-06-12 | End: 2025-06-12 | Stop reason: HOSPADM

## 2025-06-12 RX ORDER — EPINEPHRINE 0.3 MG/.3ML
0.3 INJECTION SUBCUTANEOUS ONCE AS NEEDED
Status: DISCONTINUED | OUTPATIENT
Start: 2025-06-12 | End: 2025-06-12 | Stop reason: HOSPADM

## 2025-06-12 RX ORDER — HEPARIN 100 UNIT/ML
500 SYRINGE INTRAVENOUS
Status: DISCONTINUED | OUTPATIENT
Start: 2025-06-12 | End: 2025-06-12 | Stop reason: HOSPADM

## 2025-06-12 RX ADMIN — HEPARIN 500 UNITS: 100 SYRINGE at 09:06

## 2025-06-12 RX ADMIN — SODIUM CHLORIDE 1500 MG: 9 INJECTION, SOLUTION INTRAVENOUS at 08:06

## 2025-06-26 ENCOUNTER — PATIENT MESSAGE (OUTPATIENT)
Dept: HEMATOLOGY/ONCOLOGY | Facility: CLINIC | Age: 65
End: 2025-06-26
Payer: MEDICARE

## 2025-06-26 NOTE — TELEPHONE ENCOUNTER
The coughing could be what is going around right now. Lots of viral illnesses but if she develops fever or other symptoms to call us. She needs to be drinking a lot of water. As for the urine, does she have any other symptoms? Burning with urination? Does she want us to check her urine with a UA?

## 2025-07-03 NOTE — PROGRESS NOTES
Subjective:       Patient ID: Valarie Rodriguez is a 64 y.o. female.    Pulmonologist: Dr. Zach Martinez    NSCLC Stage IIIA ([id number])--Diagnosed 24  Biopsy/pathology: Cervical mediastinoscopy done 24--multiple biopsies taken of 2R (upper paratracheal) LN and pathology showed poorly differentiated carcinoma, morphologically c/w NSCLC, c/w lung primary site, PD-L1 TPS 80%, CPS 85%, KRAS G12C mutation (approved therapies Adagrasib or Sotorasib), TMB 1.6 (low), MSI cannot be assessed.  Imagin. CT chest w/o contrast 23--7mm RUL spiculated nodule again seen and similar in appearance to comparison, suspicious for neoplasia, mediastinal lymph nodes similar in comparison, emphysema is noted.  2. CT chest w/o contrast done 2/15/24--stable RUL spiculated nodule, development of right hilar adenopathy, enlarged precarinal LN, increased in size compared to previous exam, PET/CT should be considered, emphysema.   3. CT chest w/o contrast 24--stable RUL spiculated nodule measures 7mm, stable small ground glass densities, enlarged precarinal LN measures 2.7X1.9cm, increased in size, right hilar LN vs. Mass measures 2.3X1.6cm, stable, PET should be considered, emphysema.   4. PET/CT done 24--hypermetabolic enlarged right paratracheal LN and prominent right hilum with slightly elevated activity c/w neoplasm, elevated activity of anterior thyroid bed and subcutaneous tissues, likely post-surgical, 7mm spiculated RUL nodule with adjacent vague opacity unchanged, and demonstrates no activity, bilateral L>R trochanteric bursitis, calcified uterine fibroid.   5. MRI brain w/ and w/o contrast done 24--no metastatic disease, mucous retention cyst vs, polyp in right maxillary antrum.  6. CT C/A/P 24--Upper normal to slightly enlarged single pretracheal mediastinal lymph node, nonspecific, bilaterally symmetric apical pleural based fibronodular scarring.  A somewhat irregular focal/nodular opacity in  the right apex noted measuring 8-9 mm, nonspecific, stable from the prior. Otherwise no evidence of malignancy.  7. CTA chest done 1/3/25--stable 1.3 cm lymph node in the pretracheal region, stable 8 mm non-calcified nodule with spiculated margins located in the right lung apex, no acute process.  8. CT chest w/ contrast 2/14/25--Stable appearance of subcentimeter spiculated right lung apex nodule. No interval changes to suggest new/worsened metastatic disease within the chest.  9. CT chest w/ contrast 6/5/25--Two new right upper and middle lobe nodules measuring up to 6 mm in size, indeterminate. Short-term follow-up can be obtained to ensure resolution. Otherwise stable exam compared to 02/14/2025. Pericardial effusion, measuring 1.3cm in thickness, appears new.   10. ECHO done 6/11/25--no pericardial effusion, EF 55-60%.    Procedures:   Left subclavian mediport placed 12/2/24.    Treatment History:  Weekly Carboplatin/Paclitaxel + RT. Started on 12/10/24 (patient chose to delay treatment). Completed 6 cycles on 1/14/25. Completed XRT on 1/24/25.     Current treatment plan:   Durvalumab maintenance. Started on 2/21/25.   Cycle 6 due on 7/10/25.     Chief Complaint: Other Misc (Pt reports no concerns today./)    HPI  Patient presents for follow-up of NSCLC. She is doing well. Continues on Durvalumab. She reports that a few weeks ago, she had some coughing spells that caused vomiting, but only happened 2X and resolved. Also reports that her urine was green, but it is not any longer. Otherwise she is doing good. Just c/o some ongoing fatigue, breathing is good.     Past Medical History:   Diagnosis Date    Arthritis     Hyperlipidemia     Lymphadenopathy     Malignant neoplasm of upper lobe of right lung 09/13/2024    Pulmonary nodule     Thyroid disease       Past Surgical History:   Procedure Laterality Date    CHOLECYSTECTOMY      COLONOSCOPY      INSERTION OF TUNNELED CENTRAL VENOUS CATHETER (CVC) WITH  SUBCUTANEOUS PORT N/A 12/2/2024    Procedure: [id number];  Surgeon: Melony Collier MD;  Location: [facility] OR;  Service: Cardiothoracic;  Laterality: N/A;  MEDIPORT FOR CHEMO    MEDIASTINOSCOPY N/A 8/29/2024    Procedure: MEDIASTINOSCOPY;  Surgeon: Melony Collier MD;  Location: [facility] OR;  Service: Cardiothoracic;  Laterality: N/A;    THYROIDECTOMY, PARTIAL      TUBAL LIGATION       No family history on file.  Social History     Socioeconomic History    Marital status: Single    Years of education: 11   Tobacco Use    Smoking status: Former     Current packs/day: 0.10     Average packs/day: 0.5 packs/day for 35.6 years (17.6 ttl pk-yrs)     Types: Cigarettes     Start date: 11/19/2024     Quit date: 11/2024    Tobacco comments:     Pt states that she quit 10/12/24.   Substance and Sexual Activity    Alcohol use: No     Alcohol/week: 0.0 standard drinks of alcohol    Drug use: No    Sexual activity: Yes     Partners: Male     Social Drivers of Health     Financial Resource Strain: Low Risk  (11/13/2024)    Overall Financial Resource Strain (CARDIA)     Difficulty of Paying Living Expenses: Not hard at all   Food Insecurity: No Food Insecurity (11/13/2024)    Hunger Vital Sign     Worried About Running Out of Food in the Last Year: Never true     Ran Out of Food in the Last Year: Never true   Physical Activity: Inactive (11/13/2024)    Exercise Vital Sign     Days of Exercise per Week: 0 days     Minutes of Exercise per Session: 0 min   Stress: Stress Concern Present (11/13/2024)    [employer] [place] [employer] - Occupational Stress Questionnaire     Feeling of Stress : To some extent   Housing Stability: Unknown (11/13/2024)    Housing Stability Vital Sign     Unable to Pay for Housing in the Last Year: No       Review of patient's allergies indicates:  No Known Allergies   Current Outpatient Medications on File Prior to Visit   Medication Sig Dispense Refill    cholecalciferol, vitamin D3,  1,250 mcg (50,000 unit) capsule Take 50,000 Units by mouth every 7 days.      HYDROcodone-acetaminophen (NORCO) 7.5-325 mg per tablet Take 1 tablet by mouth 4 (four) times daily as needed.      iron-vitamin C 100-250 mg, ICAR-C, 100-250 mg Tab Take 1 tablet by mouth every other day.      levothyroxine (SYNTHROID) 88 MCG tablet Take 88 mcg by mouth once daily.      buPROPion (WELLBUTRIN XL) 300 MG 24 hr tablet Take 300 mg by mouth every morning. (Patient not taking: Reported on 6/10/2025)      folic acid (FOLVITE) 1 MG tablet Take 1 tablet (1 mg total) by mouth once daily. (Patient not taking: Reported on 7/8/2025) 30 tablet 3    LORazepam (ATIVAN) 1 MG tablet Take 1 mg by mouth every 12 (twelve) hours as needed for Anxiety. (Patient not taking: Reported on 2/5/2025)      meloxicam (MOBIC) 15 MG tablet Take 15 mg by mouth daily as needed for Pain. (Patient not taking: Reported on 7/8/2025)      ondansetron (ZOFRAN) 8 MG tablet Take 1 tablet (8 mg total) by mouth every 8 (eight) hours as needed for Nausea. (Patient not taking: Reported on 7/8/2025) 20 tablet 1     Current Facility-Administered Medications on File Prior to Visit   Medication Dose Route Frequency Provider Last Rate Last Admin    folic acid tablet 1 mg  1 mg Oral Daily Jeannie Clark MD          Review of Systems   Constitutional:  Positive for fatigue. Negative for appetite change and unexpected weight change.   HENT:  Negative for mouth sores.    Eyes:  Negative for visual disturbance.   Respiratory:  Negative for cough and shortness of breath.    Cardiovascular:  Negative for chest pain.   Gastrointestinal:  Negative for abdominal pain and diarrhea.   Genitourinary:  Negative for frequency.   Musculoskeletal:  Negative for back pain.   Integumentary:  Negative for rash.   Neurological:  Negative for headaches.   Hematological:  Negative for adenopathy.   Psychiatric/Behavioral:  The patient is not nervous/anxious.          Vitals:    07/08/25 0844  "  BP: 129/68   Pulse: 67   Resp: 14   Temp: 97.5 °F (36.4 °C)   TempSrc: Oral   SpO2: 99%   Weight: 62.1 kg (136 lb 14.4 oz)   Height: 5' 5.3" (1.659 m)       Wt Readings from Last 3 Encounters:   07/08/25 0844 62.1 kg (136 lb 14.4 oz)   06/10/25 0853 61.2 kg (134 lb 14.4 oz)   05/15/25 0828 62.1 kg (136 lb 12.7 oz)     Physical Exam  Constitutional:       Appearance: Normal appearance. She is normal weight.   HENT:      Head: Normocephalic.      Comments: Resolving alopecia     Nose: Nose normal.      Mouth/Throat:      Mouth: Mucous membranes are moist.   Eyes:      Extraocular Movements: Extraocular movements intact.      Conjunctiva/sclera: Conjunctivae normal.   Cardiovascular:      Rate and Rhythm: Normal rate and regular rhythm.   Pulmonary:      Effort: Pulmonary effort is normal.      Breath sounds: Normal breath sounds.   Chest:      Comments: Anterior superior chest with incision from mediastinoscopy healed well; left chest wall mediport in place healed well  Abdominal:      General: Bowel sounds are normal. There is no distension.      Palpations: Abdomen is soft.      Tenderness: There is no abdominal tenderness.   Musculoskeletal:         General: Normal range of motion.   Skin:     General: Skin is warm.   Neurological:      General: No focal deficit present.      Mental Status: She is alert and oriented to person, place, and time.   Psychiatric:         Mood and Affect: Mood normal.         Behavior: Behavior is cooperative.         Judgment: Judgment normal.       Lab Visit on 07/08/2025   Component Date Value    WBC 07/08/2025 6.70     RBC 07/08/2025 3.94 (L)     Hgb 07/08/2025 12.9     Hct 07/08/2025 39.8     MCV 07/08/2025 101.0 (H)     MCH 07/08/2025 32.7 (H)     MCHC 07/08/2025 32.4 (L)     RDW 07/08/2025 14.0     Platelet 07/08/2025 275     MPV 07/08/2025 9.1     Neut % 07/08/2025 72.1     Lymph % 07/08/2025 17.8     Mono % 07/08/2025 6.1     Eos % 07/08/2025 3.6     Basophil % 07/08/2025 0.3  "    Imm Grans % 07/08/2025 0.1     Neut # 07/08/2025 4.83     Lymph # 07/08/2025 1.19     Mono # 07/08/2025 0.41     Eos # 07/08/2025 0.24     Baso # 07/08/2025 0.02     Imm Gran # 07/08/2025 0.01           Assessment:       1. Malignant neoplasm of upper lobe of right lung    2. Secondary malignant neoplasm of mediastinum        Plan:       Patient with Stage IIIA Lung cancer, NSCLC TTF-1+, poorly differentiated, not specified if adenocarcinoma or squamous cell carcinoma, although did confirm with pathology this is NSCLC. [employer] testing with PD-L1 85% and KRAS G12C mutation, diagnosed 8/29/24.   Primary tumor is either occult or may be the 7mm spiculated RUL mass. This was biopsied previously via bronchoscopy and reportedly negative. Will obtain records.  PET shows hypermetabolic right hilar LN and paratracheal LN, and the 7mm RUL lung nodule is not hypermetabolic, though too small to be evaluated by PET.  Brain MRI with no metastatic disease.   Case discussed with Dr. Kay.     Per NCCN guidelines, treatment recommended with definitive concurrent chemoradiation (Category 1) followed by Durvalumab maintenance.   Patient delayed her treatment for family trip and had 2nd opinion in [place] and agreed with plan.    Treatment started with concurrent RT and weekly Carboplatin/Paclitaxel on 12/10/24.   Completed 6 cycles on 1/14/25. Completed XRT on 1/24/25.   CT chest w/ contrast done 2/14/25 with stable RUL nodule, no mention of any enlarged lymph nodes, so assuming these are improved.     Started maintenance Durvalumab on 2/21/25.   S/p C5 and tolerating well. She only has fatigue.     CT chest w/ contrast done 6/5/25 showed stable RUL 8mm nodule, 2 new nodules in right lung, indeterminate, short term follow-up recommended. Also a pericardial effusion is present, was not mentioned before. ECHO done and there was no pericardial effusion present.      Labs with CBC today normal. Will f/u CMP and TSH.      Recommend to continue with same treatment.   Previous anemia work-up showed folic acid deficiency.   Started on oral folic acid 1mg daily.    Cycle 5 due later this week. Okay to proceed pending labs.     Continue monthly labs.   F/u in 4 weeks labs and toxicity check, prior to Cycle 7.  Repeat CT chest w/ contrast in 3 months--before dose of Durvalumab in 9/2025. This is already scheduled.     All questions answered at this time.   Smoking cessation strongly encouraged and she has quit!      Jeannie Clark MD    - code applied: patient requires or will require a continuous, longitudinal, and active collaborative plan of care related to this patient's health condition, NSCLC --the management of which requires the direction of a practitioner with specialized clinical knowledge, skill, and expertise, including the management of side effects and close monitoring of high risk medications (Durvalumab).     Addendum:  TSH slightly elevated, will check FT4 with next labs on RTC and may need to start on Synthroid if abnormal.    Jeannie Clark MD

## 2025-07-08 ENCOUNTER — LAB VISIT (OUTPATIENT)
Dept: LAB | Facility: HOSPITAL | Age: 65
End: 2025-07-08
Attending: INTERNAL MEDICINE
Payer: MEDICARE

## 2025-07-08 ENCOUNTER — OFFICE VISIT (OUTPATIENT)
Dept: HEMATOLOGY/ONCOLOGY | Facility: CLINIC | Age: 65
End: 2025-07-08
Payer: MEDICARE

## 2025-07-08 VITALS
WEIGHT: 136.88 LBS | DIASTOLIC BLOOD PRESSURE: 68 MMHG | SYSTOLIC BLOOD PRESSURE: 129 MMHG | BODY MASS INDEX: 22.81 KG/M2 | HEART RATE: 67 BPM | RESPIRATION RATE: 14 BRPM | HEIGHT: 65 IN | OXYGEN SATURATION: 99 % | TEMPERATURE: 98 F

## 2025-07-08 DIAGNOSIS — C78.1 SECONDARY MALIGNANT NEOPLASM OF MEDIASTINUM: ICD-10-CM

## 2025-07-08 DIAGNOSIS — C34.11 MALIGNANT NEOPLASM OF UPPER LOBE OF RIGHT LUNG: Primary | ICD-10-CM

## 2025-07-08 DIAGNOSIS — E07.9 THYROID DISORDER: ICD-10-CM

## 2025-07-08 DIAGNOSIS — C34.11 MALIGNANT NEOPLASM OF UPPER LOBE OF RIGHT LUNG: ICD-10-CM

## 2025-07-08 DIAGNOSIS — E03.2 HYPOTHYROIDISM DUE TO DRUGS: ICD-10-CM

## 2025-07-08 LAB
ALBUMIN SERPL-MCNC: 3.3 G/DL (ref 3.4–4.8)
ALBUMIN/GLOB SERPL: 0.8 RATIO (ref 1.1–2)
ALP SERPL-CCNC: 64 UNIT/L (ref 40–150)
ALT SERPL-CCNC: 5 UNIT/L (ref 0–55)
ANION GAP SERPL CALC-SCNC: 11 MEQ/L
AST SERPL-CCNC: 13 UNIT/L (ref 11–45)
BASOPHILS # BLD AUTO: 0.02 X10(3)/MCL
BASOPHILS NFR BLD AUTO: 0.3 %
BILIRUB SERPL-MCNC: 0.4 MG/DL
BUN SERPL-MCNC: 17.1 MG/DL (ref 9.8–20.1)
CALCIUM SERPL-MCNC: 8.6 MG/DL (ref 8.4–10.2)
CHLORIDE SERPL-SCNC: 105 MMOL/L (ref 98–107)
CO2 SERPL-SCNC: 25 MMOL/L (ref 23–31)
CREAT SERPL-MCNC: 0.86 MG/DL (ref 0.55–1.02)
CREAT/UREA NIT SERPL: 20
EOSINOPHIL # BLD AUTO: 0.24 X10(3)/MCL (ref 0–0.9)
EOSINOPHIL NFR BLD AUTO: 3.6 %
ERYTHROCYTE [DISTWIDTH] IN BLOOD BY AUTOMATED COUNT: 14 % (ref 11.5–17)
GFR SERPLBLD CREATININE-BSD FMLA CKD-EPI: >60 ML/MIN/1.73/M2
GLOBULIN SER-MCNC: 4 GM/DL (ref 2.4–3.5)
GLUCOSE SERPL-MCNC: 92 MG/DL (ref 82–115)
HCT VFR BLD AUTO: 39.8 % (ref 37–47)
HGB BLD-MCNC: 12.9 G/DL (ref 12–16)
IMM GRANULOCYTES # BLD AUTO: 0.01 X10(3)/MCL (ref 0–0.04)
IMM GRANULOCYTES NFR BLD AUTO: 0.1 %
LYMPHOCYTES # BLD AUTO: 1.19 X10(3)/MCL (ref 0.6–4.6)
LYMPHOCYTES NFR BLD AUTO: 17.8 %
MAGNESIUM SERPL-MCNC: 2.1 MG/DL (ref 1.6–2.6)
MCH RBC QN AUTO: 32.7 PG (ref 27–31)
MCHC RBC AUTO-ENTMCNC: 32.4 G/DL (ref 33–36)
MCV RBC AUTO: 101 FL (ref 80–94)
MONOCYTES # BLD AUTO: 0.41 X10(3)/MCL (ref 0.1–1.3)
MONOCYTES NFR BLD AUTO: 6.1 %
NEUTROPHILS # BLD AUTO: 4.83 X10(3)/MCL (ref 2.1–9.2)
NEUTROPHILS NFR BLD AUTO: 72.1 %
PLATELET # BLD AUTO: 275 X10(3)/MCL (ref 130–400)
PMV BLD AUTO: 9.1 FL (ref 7.4–10.4)
POTASSIUM SERPL-SCNC: 4.2 MMOL/L (ref 3.5–5.1)
PROT SERPL-MCNC: 7.3 GM/DL (ref 5.8–7.6)
RBC # BLD AUTO: 3.94 X10(6)/MCL (ref 4.2–5.4)
SODIUM SERPL-SCNC: 141 MMOL/L (ref 136–145)
TSH SERPL-ACNC: 7.59 UIU/ML (ref 0.35–4.94)
WBC # BLD AUTO: 6.7 X10(3)/MCL (ref 4.5–11.5)

## 2025-07-08 PROCEDURE — 3078F DIAST BP <80 MM HG: CPT | Mod: CPTII,S$GLB,, | Performed by: INTERNAL MEDICINE

## 2025-07-08 PROCEDURE — 1126F AMNT PAIN NOTED NONE PRSNT: CPT | Mod: CPTII,S$GLB,, | Performed by: INTERNAL MEDICINE

## 2025-07-08 PROCEDURE — 36415 COLL VENOUS BLD VENIPUNCTURE: CPT

## 2025-07-08 PROCEDURE — 3008F BODY MASS INDEX DOCD: CPT | Mod: CPTII,S$GLB,, | Performed by: INTERNAL MEDICINE

## 2025-07-08 PROCEDURE — 3288F FALL RISK ASSESSMENT DOCD: CPT | Mod: CPTII,S$GLB,, | Performed by: INTERNAL MEDICINE

## 2025-07-08 PROCEDURE — 80053 COMPREHEN METABOLIC PANEL: CPT

## 2025-07-08 PROCEDURE — 99215 OFFICE O/P EST HI 40 MIN: CPT | Mod: S$GLB,,, | Performed by: INTERNAL MEDICINE

## 2025-07-08 PROCEDURE — 1101F PT FALLS ASSESS-DOCD LE1/YR: CPT | Mod: CPTII,S$GLB,, | Performed by: INTERNAL MEDICINE

## 2025-07-08 PROCEDURE — 85025 COMPLETE CBC W/AUTO DIFF WBC: CPT

## 2025-07-08 PROCEDURE — 83735 ASSAY OF MAGNESIUM: CPT

## 2025-07-08 PROCEDURE — G2211 COMPLEX E/M VISIT ADD ON: HCPCS | Mod: S$GLB,,, | Performed by: INTERNAL MEDICINE

## 2025-07-08 PROCEDURE — 3074F SYST BP LT 130 MM HG: CPT | Mod: CPTII,S$GLB,, | Performed by: INTERNAL MEDICINE

## 2025-07-08 PROCEDURE — 99999 PR PBB SHADOW E&M-EST. PATIENT-LVL IV: CPT | Mod: PBBFAC,,, | Performed by: INTERNAL MEDICINE

## 2025-07-08 PROCEDURE — 1159F MED LIST DOCD IN RCRD: CPT | Mod: CPTII,S$GLB,, | Performed by: INTERNAL MEDICINE

## 2025-07-08 PROCEDURE — 84443 ASSAY THYROID STIM HORMONE: CPT

## 2025-07-09 RX ORDER — HEPARIN 100 UNIT/ML
500 SYRINGE INTRAVENOUS
Status: CANCELLED | OUTPATIENT
Start: 2025-07-10

## 2025-07-09 RX ORDER — SODIUM CHLORIDE 0.9 % (FLUSH) 0.9 %
10 SYRINGE (ML) INJECTION
Status: CANCELLED | OUTPATIENT
Start: 2025-07-10

## 2025-07-09 RX ORDER — DIPHENHYDRAMINE HYDROCHLORIDE 50 MG/ML
50 INJECTION, SOLUTION INTRAMUSCULAR; INTRAVENOUS ONCE AS NEEDED
Status: CANCELLED | OUTPATIENT
Start: 2025-07-10

## 2025-07-09 RX ORDER — EPINEPHRINE 0.3 MG/.3ML
0.3 INJECTION SUBCUTANEOUS ONCE AS NEEDED
Status: CANCELLED | OUTPATIENT
Start: 2025-07-10

## 2025-07-10 ENCOUNTER — INFUSION (OUTPATIENT)
Dept: INFUSION THERAPY | Facility: HOSPITAL | Age: 65
End: 2025-07-10
Attending: INTERNAL MEDICINE
Payer: MEDICARE

## 2025-07-10 VITALS
TEMPERATURE: 98 F | RESPIRATION RATE: 18 BRPM | DIASTOLIC BLOOD PRESSURE: 69 MMHG | OXYGEN SATURATION: 100 % | HEIGHT: 62 IN | BODY MASS INDEX: 25.19 KG/M2 | SYSTOLIC BLOOD PRESSURE: 135 MMHG | HEART RATE: 63 BPM | WEIGHT: 136.88 LBS

## 2025-07-10 DIAGNOSIS — C34.11 MALIGNANT NEOPLASM OF UPPER LOBE OF RIGHT LUNG: Primary | ICD-10-CM

## 2025-07-10 PROCEDURE — 96413 CHEMO IV INFUSION 1 HR: CPT

## 2025-07-10 PROCEDURE — 25000003 PHARM REV CODE 250: Performed by: INTERNAL MEDICINE

## 2025-07-10 PROCEDURE — A4216 STERILE WATER/SALINE, 10 ML: HCPCS | Performed by: INTERNAL MEDICINE

## 2025-07-10 PROCEDURE — 63600175 PHARM REV CODE 636 W HCPCS: Mod: JZ,TB | Performed by: INTERNAL MEDICINE

## 2025-07-10 RX ORDER — SODIUM CHLORIDE 0.9 % (FLUSH) 0.9 %
10 SYRINGE (ML) INJECTION
Status: DISCONTINUED | OUTPATIENT
Start: 2025-07-10 | End: 2025-07-10 | Stop reason: HOSPADM

## 2025-07-10 RX ORDER — DIPHENHYDRAMINE HYDROCHLORIDE 50 MG/ML
50 INJECTION, SOLUTION INTRAMUSCULAR; INTRAVENOUS ONCE AS NEEDED
Status: DISCONTINUED | OUTPATIENT
Start: 2025-07-10 | End: 2025-07-10 | Stop reason: HOSPADM

## 2025-07-10 RX ORDER — EPINEPHRINE 0.3 MG/.3ML
0.3 INJECTION SUBCUTANEOUS ONCE AS NEEDED
Status: DISCONTINUED | OUTPATIENT
Start: 2025-07-10 | End: 2025-07-10 | Stop reason: HOSPADM

## 2025-07-10 RX ORDER — HEPARIN 100 UNIT/ML
500 SYRINGE INTRAVENOUS
Status: DISCONTINUED | OUTPATIENT
Start: 2025-07-10 | End: 2025-07-10 | Stop reason: HOSPADM

## 2025-07-10 RX ADMIN — SODIUM CHLORIDE 1500 MG: 9 INJECTION, SOLUTION INTRAVENOUS at 08:07

## 2025-07-10 RX ADMIN — Medication 10 ML: at 09:07

## 2025-07-10 RX ADMIN — SODIUM CHLORIDE: 9 INJECTION, SOLUTION INTRAVENOUS at 08:07

## 2025-07-10 RX ADMIN — HEPARIN 500 UNITS: 100 SYRINGE at 09:07

## 2025-07-10 NOTE — PLAN OF CARE
C6 Imfinzi given. Tolerated well. Next appts confirmed with pt. Stated uses portal to view next appts. Dc'd home in stable condition.

## 2025-08-05 ENCOUNTER — OFFICE VISIT (OUTPATIENT)
Dept: HEMATOLOGY/ONCOLOGY | Facility: CLINIC | Age: 65
End: 2025-08-05
Payer: MEDICARE

## 2025-08-05 ENCOUNTER — LAB VISIT (OUTPATIENT)
Dept: LAB | Facility: HOSPITAL | Age: 65
End: 2025-08-05
Attending: INTERNAL MEDICINE
Payer: MEDICARE

## 2025-08-05 VITALS
BODY MASS INDEX: 24.96 KG/M2 | SYSTOLIC BLOOD PRESSURE: 114 MMHG | HEIGHT: 62 IN | RESPIRATION RATE: 14 BRPM | DIASTOLIC BLOOD PRESSURE: 65 MMHG | WEIGHT: 135.63 LBS | TEMPERATURE: 98 F | OXYGEN SATURATION: 95 % | HEART RATE: 71 BPM

## 2025-08-05 DIAGNOSIS — C78.1 SECONDARY MALIGNANT NEOPLASM OF MEDIASTINUM: ICD-10-CM

## 2025-08-05 DIAGNOSIS — E03.2 HYPOTHYROIDISM DUE TO DRUGS: ICD-10-CM

## 2025-08-05 DIAGNOSIS — C34.11 MALIGNANT NEOPLASM OF UPPER LOBE OF RIGHT LUNG: ICD-10-CM

## 2025-08-05 DIAGNOSIS — C34.11 MALIGNANT NEOPLASM OF UPPER LOBE OF RIGHT LUNG: Primary | ICD-10-CM

## 2025-08-05 LAB
ALBUMIN SERPL-MCNC: 3.3 G/DL (ref 3.4–4.8)
ALBUMIN/GLOB SERPL: 0.8 RATIO (ref 1.1–2)
ALP SERPL-CCNC: 71 UNIT/L (ref 40–150)
ALT SERPL-CCNC: 6 UNIT/L (ref 0–55)
ANION GAP SERPL CALC-SCNC: 8 MEQ/L
AST SERPL-CCNC: 12 UNIT/L (ref 11–45)
BASOPHILS # BLD AUTO: 0.03 X10(3)/MCL
BASOPHILS NFR BLD AUTO: 0.4 %
BILIRUB SERPL-MCNC: 0.2 MG/DL
BUN SERPL-MCNC: 25.2 MG/DL (ref 9.8–20.1)
CALCIUM SERPL-MCNC: 9.9 MG/DL (ref 8.4–10.2)
CHLORIDE SERPL-SCNC: 104 MMOL/L (ref 98–107)
CO2 SERPL-SCNC: 28 MMOL/L (ref 23–31)
CREAT SERPL-MCNC: 0.82 MG/DL (ref 0.55–1.02)
CREAT/UREA NIT SERPL: 31
EOSINOPHIL # BLD AUTO: 0.24 X10(3)/MCL (ref 0–0.9)
EOSINOPHIL NFR BLD AUTO: 2.9 %
ERYTHROCYTE [DISTWIDTH] IN BLOOD BY AUTOMATED COUNT: 13.7 % (ref 11.5–17)
GFR SERPLBLD CREATININE-BSD FMLA CKD-EPI: >60 ML/MIN/1.73/M2
GLOBULIN SER-MCNC: 4.1 GM/DL (ref 2.4–3.5)
GLUCOSE SERPL-MCNC: 59 MG/DL (ref 82–115)
HCT VFR BLD AUTO: 39.5 % (ref 37–47)
HGB BLD-MCNC: 12.8 G/DL (ref 12–16)
IMM GRANULOCYTES # BLD AUTO: 0.01 X10(3)/MCL (ref 0–0.04)
IMM GRANULOCYTES NFR BLD AUTO: 0.1 %
LYMPHOCYTES # BLD AUTO: 1.6 X10(3)/MCL (ref 0.6–4.6)
LYMPHOCYTES NFR BLD AUTO: 19.2 %
MAGNESIUM SERPL-MCNC: 1.9 MG/DL (ref 1.6–2.6)
MCH RBC QN AUTO: 33 PG (ref 27–31)
MCHC RBC AUTO-ENTMCNC: 32.4 G/DL (ref 33–36)
MCV RBC AUTO: 101.8 FL (ref 80–94)
MONOCYTES # BLD AUTO: 0.66 X10(3)/MCL (ref 0.1–1.3)
MONOCYTES NFR BLD AUTO: 7.9 %
NEUTROPHILS # BLD AUTO: 5.78 X10(3)/MCL (ref 2.1–9.2)
NEUTROPHILS NFR BLD AUTO: 69.5 %
PLATELET # BLD AUTO: 288 X10(3)/MCL (ref 130–400)
PMV BLD AUTO: 9 FL (ref 7.4–10.4)
POTASSIUM SERPL-SCNC: 4.2 MMOL/L (ref 3.5–5.1)
PROT SERPL-MCNC: 7.4 GM/DL (ref 5.8–7.6)
RBC # BLD AUTO: 3.88 X10(6)/MCL (ref 4.2–5.4)
SODIUM SERPL-SCNC: 140 MMOL/L (ref 136–145)
T4 FREE SERPL-MCNC: 1.34 NG/DL (ref 0.7–1.48)
TSH SERPL-ACNC: 1.42 UIU/ML (ref 0.35–4.94)
WBC # BLD AUTO: 8.32 X10(3)/MCL (ref 4.5–11.5)

## 2025-08-05 PROCEDURE — 80053 COMPREHEN METABOLIC PANEL: CPT

## 2025-08-05 PROCEDURE — 3288F FALL RISK ASSESSMENT DOCD: CPT | Mod: CPTII,S$GLB,, | Performed by: INTERNAL MEDICINE

## 2025-08-05 PROCEDURE — 3008F BODY MASS INDEX DOCD: CPT | Mod: CPTII,S$GLB,, | Performed by: INTERNAL MEDICINE

## 2025-08-05 PROCEDURE — 85025 COMPLETE CBC W/AUTO DIFF WBC: CPT

## 2025-08-05 PROCEDURE — 83735 ASSAY OF MAGNESIUM: CPT

## 2025-08-05 PROCEDURE — 3074F SYST BP LT 130 MM HG: CPT | Mod: CPTII,S$GLB,, | Performed by: INTERNAL MEDICINE

## 2025-08-05 PROCEDURE — 1159F MED LIST DOCD IN RCRD: CPT | Mod: CPTII,S$GLB,, | Performed by: INTERNAL MEDICINE

## 2025-08-05 PROCEDURE — 3078F DIAST BP <80 MM HG: CPT | Mod: CPTII,S$GLB,, | Performed by: INTERNAL MEDICINE

## 2025-08-05 PROCEDURE — 84439 ASSAY OF FREE THYROXINE: CPT

## 2025-08-05 PROCEDURE — 1126F AMNT PAIN NOTED NONE PRSNT: CPT | Mod: CPTII,S$GLB,, | Performed by: INTERNAL MEDICINE

## 2025-08-05 PROCEDURE — 99215 OFFICE O/P EST HI 40 MIN: CPT | Mod: S$GLB,,, | Performed by: INTERNAL MEDICINE

## 2025-08-05 PROCEDURE — 99999 PR PBB SHADOW E&M-EST. PATIENT-LVL IV: CPT | Mod: PBBFAC,,, | Performed by: INTERNAL MEDICINE

## 2025-08-05 PROCEDURE — 36415 COLL VENOUS BLD VENIPUNCTURE: CPT

## 2025-08-05 PROCEDURE — G2211 COMPLEX E/M VISIT ADD ON: HCPCS | Mod: S$GLB,,, | Performed by: INTERNAL MEDICINE

## 2025-08-05 PROCEDURE — 1160F RVW MEDS BY RX/DR IN RCRD: CPT | Mod: CPTII,S$GLB,, | Performed by: INTERNAL MEDICINE

## 2025-08-05 PROCEDURE — 1101F PT FALLS ASSESS-DOCD LE1/YR: CPT | Mod: CPTII,S$GLB,, | Performed by: INTERNAL MEDICINE

## 2025-08-05 PROCEDURE — 84443 ASSAY THYROID STIM HORMONE: CPT

## 2025-08-05 RX ORDER — BENZONATATE 100 MG/1
100 CAPSULE ORAL 3 TIMES DAILY PRN
Qty: 30 CAPSULE | Refills: 1 | Status: SHIPPED | OUTPATIENT
Start: 2025-08-05 | End: 2025-08-25

## 2025-08-06 RX ORDER — SODIUM CHLORIDE 0.9 % (FLUSH) 0.9 %
10 SYRINGE (ML) INJECTION
Status: CANCELLED | OUTPATIENT
Start: 2025-08-07

## 2025-08-06 RX ORDER — DIPHENHYDRAMINE HYDROCHLORIDE 50 MG/ML
50 INJECTION, SOLUTION INTRAMUSCULAR; INTRAVENOUS ONCE AS NEEDED
Status: CANCELLED | OUTPATIENT
Start: 2025-08-07

## 2025-08-06 RX ORDER — HEPARIN 100 UNIT/ML
500 SYRINGE INTRAVENOUS
Status: CANCELLED | OUTPATIENT
Start: 2025-08-07

## 2025-08-06 RX ORDER — EPINEPHRINE 0.3 MG/.3ML
0.3 INJECTION SUBCUTANEOUS ONCE AS NEEDED
Status: CANCELLED | OUTPATIENT
Start: 2025-08-07

## 2025-08-07 ENCOUNTER — INFUSION (OUTPATIENT)
Dept: INFUSION THERAPY | Facility: HOSPITAL | Age: 65
End: 2025-08-07
Attending: INTERNAL MEDICINE
Payer: MEDICARE

## 2025-08-07 VITALS — HEART RATE: 77 BPM | DIASTOLIC BLOOD PRESSURE: 68 MMHG | RESPIRATION RATE: 16 BRPM | SYSTOLIC BLOOD PRESSURE: 125 MMHG

## 2025-08-07 DIAGNOSIS — C34.11 MALIGNANT NEOPLASM OF UPPER LOBE OF RIGHT LUNG: Primary | ICD-10-CM

## 2025-08-07 PROCEDURE — 25000003 PHARM REV CODE 250: Performed by: INTERNAL MEDICINE

## 2025-08-07 PROCEDURE — 96413 CHEMO IV INFUSION 1 HR: CPT

## 2025-08-07 PROCEDURE — 63600175 PHARM REV CODE 636 W HCPCS: Mod: JZ,TB | Performed by: INTERNAL MEDICINE

## 2025-08-07 RX ORDER — EPINEPHRINE 0.3 MG/.3ML
0.3 INJECTION SUBCUTANEOUS ONCE AS NEEDED
Status: DISCONTINUED | OUTPATIENT
Start: 2025-08-07 | End: 2025-08-07 | Stop reason: HOSPADM

## 2025-08-07 RX ORDER — SODIUM CHLORIDE 0.9 % (FLUSH) 0.9 %
10 SYRINGE (ML) INJECTION
Status: DISCONTINUED | OUTPATIENT
Start: 2025-08-07 | End: 2025-08-07 | Stop reason: HOSPADM

## 2025-08-07 RX ORDER — DIPHENHYDRAMINE HYDROCHLORIDE 50 MG/ML
50 INJECTION, SOLUTION INTRAMUSCULAR; INTRAVENOUS ONCE AS NEEDED
Status: DISCONTINUED | OUTPATIENT
Start: 2025-08-07 | End: 2025-08-07 | Stop reason: HOSPADM

## 2025-08-07 RX ORDER — HEPARIN 100 UNIT/ML
500 SYRINGE INTRAVENOUS
Status: DISCONTINUED | OUTPATIENT
Start: 2025-08-07 | End: 2025-08-07 | Stop reason: HOSPADM

## 2025-08-07 RX ADMIN — SODIUM CHLORIDE 1500 MG: 9 INJECTION, SOLUTION INTRAVENOUS at 08:08

## 2025-09-02 ENCOUNTER — HOSPITAL ENCOUNTER (OUTPATIENT)
Dept: RADIOLOGY | Facility: HOSPITAL | Age: 65
Discharge: HOME | End: 2025-09-02
Attending: INTERNAL MEDICINE
Payer: MEDICARE

## 2025-09-02 DIAGNOSIS — C78.1 SECONDARY MALIGNANT NEOPLASM OF MEDIASTINUM: ICD-10-CM

## 2025-09-02 DIAGNOSIS — C34.11 MALIGNANT NEOPLASM OF UPPER LOBE OF RIGHT LUNG: ICD-10-CM

## 2025-09-02 PROCEDURE — 71260 CT THORAX DX C+: CPT | Mod: TC

## 2025-09-02 PROCEDURE — 25500020 PHARM REV CODE 255: Performed by: INTERNAL MEDICINE

## 2025-09-02 RX ADMIN — IOHEXOL 100 ML: 350 INJECTION, SOLUTION INTRAVENOUS at 10:09

## 2025-09-04 ENCOUNTER — OFFICE VISIT (OUTPATIENT)
Dept: HEMATOLOGY/ONCOLOGY | Facility: CLINIC | Age: 65
End: 2025-09-04
Payer: MEDICARE

## 2025-09-04 ENCOUNTER — INFUSION (OUTPATIENT)
Dept: INFUSION THERAPY | Facility: HOSPITAL | Age: 65
End: 2025-09-04
Attending: INTERNAL MEDICINE
Payer: MEDICARE

## 2025-09-04 VITALS
HEART RATE: 68 BPM | RESPIRATION RATE: 18 BRPM | TEMPERATURE: 98 F | HEIGHT: 62 IN | BODY MASS INDEX: 25.12 KG/M2 | DIASTOLIC BLOOD PRESSURE: 75 MMHG | SYSTOLIC BLOOD PRESSURE: 137 MMHG | OXYGEN SATURATION: 99 % | WEIGHT: 136.5 LBS

## 2025-09-04 VITALS — DIASTOLIC BLOOD PRESSURE: 75 MMHG | SYSTOLIC BLOOD PRESSURE: 137 MMHG

## 2025-09-04 DIAGNOSIS — E03.2 HYPOTHYROIDISM DUE TO DRUGS: ICD-10-CM

## 2025-09-04 DIAGNOSIS — C78.1 SECONDARY MALIGNANT NEOPLASM OF MEDIASTINUM: ICD-10-CM

## 2025-09-04 DIAGNOSIS — C34.11 MALIGNANT NEOPLASM OF UPPER LOBE OF RIGHT LUNG: Primary | ICD-10-CM

## 2025-09-04 PROCEDURE — 1101F PT FALLS ASSESS-DOCD LE1/YR: CPT | Mod: CPTII,S$GLB,, | Performed by: INTERNAL MEDICINE

## 2025-09-04 PROCEDURE — 96413 CHEMO IV INFUSION 1 HR: CPT

## 2025-09-04 PROCEDURE — 3008F BODY MASS INDEX DOCD: CPT | Mod: CPTII,S$GLB,, | Performed by: INTERNAL MEDICINE

## 2025-09-04 PROCEDURE — G2211 COMPLEX E/M VISIT ADD ON: HCPCS | Mod: S$GLB,,, | Performed by: INTERNAL MEDICINE

## 2025-09-04 PROCEDURE — 3075F SYST BP GE 130 - 139MM HG: CPT | Mod: CPTII,S$GLB,, | Performed by: INTERNAL MEDICINE

## 2025-09-04 PROCEDURE — 1159F MED LIST DOCD IN RCRD: CPT | Mod: CPTII,S$GLB,, | Performed by: INTERNAL MEDICINE

## 2025-09-04 PROCEDURE — 25000003 PHARM REV CODE 250: Performed by: INTERNAL MEDICINE

## 2025-09-04 PROCEDURE — 1160F RVW MEDS BY RX/DR IN RCRD: CPT | Mod: CPTII,S$GLB,, | Performed by: INTERNAL MEDICINE

## 2025-09-04 PROCEDURE — 63600175 PHARM REV CODE 636 W HCPCS: Mod: JZ,TB | Performed by: INTERNAL MEDICINE

## 2025-09-04 PROCEDURE — 99215 OFFICE O/P EST HI 40 MIN: CPT | Mod: S$GLB,,, | Performed by: INTERNAL MEDICINE

## 2025-09-04 PROCEDURE — 3288F FALL RISK ASSESSMENT DOCD: CPT | Mod: CPTII,S$GLB,, | Performed by: INTERNAL MEDICINE

## 2025-09-04 PROCEDURE — 1125F AMNT PAIN NOTED PAIN PRSNT: CPT | Mod: CPTII,S$GLB,, | Performed by: INTERNAL MEDICINE

## 2025-09-04 PROCEDURE — 3078F DIAST BP <80 MM HG: CPT | Mod: CPTII,S$GLB,, | Performed by: INTERNAL MEDICINE

## 2025-09-04 PROCEDURE — 99999 PR PBB SHADOW E&M-EST. PATIENT-LVL IV: CPT | Mod: PBBFAC,,, | Performed by: INTERNAL MEDICINE

## 2025-09-04 RX ORDER — DIPHENHYDRAMINE HYDROCHLORIDE 50 MG/ML
50 INJECTION, SOLUTION INTRAMUSCULAR; INTRAVENOUS ONCE AS NEEDED
Status: CANCELLED | OUTPATIENT
Start: 2025-09-04 | End: 2037-01-31

## 2025-09-04 RX ORDER — HEPARIN 100 UNIT/ML
500 SYRINGE INTRAVENOUS
Status: CANCELLED | OUTPATIENT
Start: 2025-09-04

## 2025-09-04 RX ORDER — DIPHENHYDRAMINE HYDROCHLORIDE 50 MG/ML
50 INJECTION, SOLUTION INTRAMUSCULAR; INTRAVENOUS ONCE AS NEEDED
Status: DISCONTINUED | OUTPATIENT
Start: 2025-09-04 | End: 2025-09-04 | Stop reason: HOSPADM

## 2025-09-04 RX ORDER — SODIUM CHLORIDE 0.9 % (FLUSH) 0.9 %
10 SYRINGE (ML) INJECTION
Status: CANCELLED | OUTPATIENT
Start: 2025-09-04

## 2025-09-04 RX ORDER — EPINEPHRINE 0.3 MG/.3ML
0.3 INJECTION SUBCUTANEOUS ONCE AS NEEDED
Status: CANCELLED | OUTPATIENT
Start: 2025-09-04 | End: 2037-01-31

## 2025-09-04 RX ORDER — HEPARIN 100 UNIT/ML
500 SYRINGE INTRAVENOUS
Status: DISCONTINUED | OUTPATIENT
Start: 2025-09-04 | End: 2025-09-04 | Stop reason: HOSPADM

## 2025-09-04 RX ORDER — SODIUM CHLORIDE 0.9 % (FLUSH) 0.9 %
10 SYRINGE (ML) INJECTION
Status: DISCONTINUED | OUTPATIENT
Start: 2025-09-04 | End: 2025-09-04 | Stop reason: HOSPADM

## 2025-09-04 RX ORDER — EPINEPHRINE 0.3 MG/.3ML
0.3 INJECTION SUBCUTANEOUS ONCE AS NEEDED
Status: DISCONTINUED | OUTPATIENT
Start: 2025-09-04 | End: 2025-09-04 | Stop reason: HOSPADM

## 2025-09-04 RX ADMIN — SODIUM CHLORIDE 1500 MG: 9 INJECTION, SOLUTION INTRAVENOUS at 10:09

## (undated) DEVICE — DRAPE STERI INCISE 23X23IN

## (undated) DEVICE — Device

## (undated) DEVICE — BAG MEDI-PLAST DECANTER C-FLOW

## (undated) DEVICE — CLIP LIGATING HEMOCLP SMALL

## (undated) DEVICE — PENCIL ELECSURG ROCKER 15FT

## (undated) DEVICE — SYR 10CC LUER LOCK

## (undated) DEVICE — SPONGE GAUZE 16PLY 4X4

## (undated) DEVICE — GLOVE PROTEXIS NEOPRN SZ8

## (undated) DEVICE — SUT MONOCRYL PLUS UD 3-0 27

## (undated) DEVICE — NDL HYPO REG 25G X 1 1/2

## (undated) DEVICE — CUP PROFEX GLASS GRADUATE 1OZ

## (undated) DEVICE — ELECTRODE PATIENT RETURN DISP

## (undated) DEVICE — CLIP LIGATING MEDIUM

## (undated) DEVICE — DRESSING TELFA N ADH 3X8

## (undated) DEVICE — GLOVE SURG BIOGEL LATEX SZ 7.5

## (undated) DEVICE — SUT VICRYL 2-0 36 CT-1

## (undated) DEVICE — SPONGE LAP 18X18 PREWASHED

## (undated) DEVICE — KIT SURGICAL TURNOVER

## (undated) DEVICE — GLOVE PROTEXIS PI SYN SURG 7.5

## (undated) DEVICE — SOL NACL IRR 1000ML BTL

## (undated) DEVICE — CONTAINER SPECIMEN SCREW 4OZ

## (undated) DEVICE — GLOVE PROTEXIS HYDROGEL SZ6.5

## (undated) DEVICE — COVER C-ARM STRAP BAND 44X80IN

## (undated) DEVICE — CORD LAP 10 DISP

## (undated) DEVICE — DRESSING TRANS 4X4 TEGADERM

## (undated) DEVICE — SOL NORMAL USPCA 0.9%

## (undated) DEVICE — BLADE SURG STAINLESS STEEL #11

## (undated) DEVICE — GLOVE PROTEXIS BLUE LATEX 7

## (undated) DEVICE — GLOVE BIOGEL 7.5

## (undated) DEVICE — GOWN SMARTSLEEVE AAMI LVL4 XXL

## (undated) DEVICE — BOWL STERILE LARGE 32OZ